# Patient Record
Sex: MALE | Race: WHITE | Employment: OTHER | URBAN - METROPOLITAN AREA
[De-identification: names, ages, dates, MRNs, and addresses within clinical notes are randomized per-mention and may not be internally consistent; named-entity substitution may affect disease eponyms.]

---

## 2018-01-22 PROBLEM — J11.1 FLU: Status: ACTIVE | Noted: 2018-01-22

## 2018-08-13 ENCOUNTER — HOSPITAL ENCOUNTER (INPATIENT)
Age: 78
LOS: 6 days | Discharge: HOME | DRG: 378 | End: 2018-08-19
Attending: EMERGENCY MEDICINE | Admitting: INTERNAL MEDICINE
Payer: MEDICARE

## 2018-08-13 DIAGNOSIS — K62.5 RECTAL BLEEDING: Primary | ICD-10-CM

## 2018-08-13 DIAGNOSIS — K62.5 BRIGHT RED RECTAL BLEEDING: ICD-10-CM

## 2018-08-13 PROBLEM — I50.9 HEART FAILURE (HCC): Status: ACTIVE | Noted: 2018-08-13

## 2018-08-13 LAB
ABO/RH: NORMAL
ALBUMIN SERPL-MCNC: 4.4 G/DL (ref 3.4–5)
ALP BLD-CCNC: 69 U/L (ref 40–129)
ALT SERPL-CCNC: 9 U/L (ref 10–40)
ANION GAP SERPL CALCULATED.3IONS-SCNC: 11 MMOL/L (ref 3–16)
ANTIBODY SCREEN: NORMAL
AST SERPL-CCNC: 16 U/L (ref 15–37)
BASOPHILS ABSOLUTE: 0.1 K/UL (ref 0–0.2)
BASOPHILS RELATIVE PERCENT: 0.8 %
BILIRUB SERPL-MCNC: 0.9 MG/DL (ref 0–1)
BILIRUBIN DIRECT: <0.2 MG/DL (ref 0–0.3)
BILIRUBIN, INDIRECT: ABNORMAL MG/DL (ref 0–1)
BUN BLDV-MCNC: 17 MG/DL (ref 7–20)
CALCIUM SERPL-MCNC: 9.6 MG/DL (ref 8.3–10.6)
CHLORIDE BLD-SCNC: 95 MMOL/L (ref 99–110)
CO2: 30 MMOL/L (ref 21–32)
CREAT SERPL-MCNC: 1.1 MG/DL (ref 0.8–1.3)
EOSINOPHILS ABSOLUTE: 0.1 K/UL (ref 0–0.6)
EOSINOPHILS RELATIVE PERCENT: 1 %
GFR AFRICAN AMERICAN: >60
GFR NON-AFRICAN AMERICAN: >60
GLUCOSE BLD-MCNC: 294 MG/DL (ref 70–99)
HCT VFR BLD CALC: 26.7 % (ref 40.5–52.5)
HCT VFR BLD CALC: 29.6 % (ref 40.5–52.5)
HCT VFR BLD CALC: 35.8 % (ref 40.5–52.5)
HEMOGLOBIN: 10 G/DL (ref 13.5–17.5)
HEMOGLOBIN: 12 G/DL (ref 13.5–17.5)
HEMOGLOBIN: 9.1 G/DL (ref 13.5–17.5)
INR BLD: 1.13 (ref 0.86–1.14)
LACTIC ACID: 1.3 MMOL/L (ref 0.4–2)
LIPASE: 13 U/L (ref 13–60)
LYMPHOCYTES ABSOLUTE: 1 K/UL (ref 1–5.1)
LYMPHOCYTES RELATIVE PERCENT: 15.4 %
MCH RBC QN AUTO: 29.3 PG (ref 26–34)
MCHC RBC AUTO-ENTMCNC: 33.4 G/DL (ref 31–36)
MCV RBC AUTO: 87.9 FL (ref 80–100)
MONOCYTES ABSOLUTE: 0.5 K/UL (ref 0–1.3)
MONOCYTES RELATIVE PERCENT: 7.2 %
NEUTROPHILS ABSOLUTE: 4.8 K/UL (ref 1.7–7.7)
NEUTROPHILS RELATIVE PERCENT: 75.6 %
PDW BLD-RTO: 14.4 % (ref 12.4–15.4)
PLATELET # BLD: 220 K/UL (ref 135–450)
PMV BLD AUTO: 9.3 FL (ref 5–10.5)
POTASSIUM REFLEX MAGNESIUM: 4 MMOL/L (ref 3.5–5.1)
PROTHROMBIN TIME: 12.9 SEC (ref 9.8–13)
RBC # BLD: 4.07 M/UL (ref 4.2–5.9)
SODIUM BLD-SCNC: 136 MMOL/L (ref 136–145)
TOTAL PROTEIN: 7.9 G/DL (ref 6.4–8.2)
WBC # BLD: 6.3 K/UL (ref 4–11)

## 2018-08-13 PROCEDURE — 85610 PROTHROMBIN TIME: CPT

## 2018-08-13 PROCEDURE — 80076 HEPATIC FUNCTION PANEL: CPT

## 2018-08-13 PROCEDURE — 96361 HYDRATE IV INFUSION ADD-ON: CPT

## 2018-08-13 PROCEDURE — 82272 OCCULT BLD FECES 1-3 TESTS: CPT

## 2018-08-13 PROCEDURE — 96360 HYDRATION IV INFUSION INIT: CPT

## 2018-08-13 PROCEDURE — 2580000003 HC RX 258: Performed by: NURSE PRACTITIONER

## 2018-08-13 PROCEDURE — 86901 BLOOD TYPING SEROLOGIC RH(D): CPT

## 2018-08-13 PROCEDURE — 36415 COLL VENOUS BLD VENIPUNCTURE: CPT

## 2018-08-13 PROCEDURE — 86850 RBC ANTIBODY SCREEN: CPT

## 2018-08-13 PROCEDURE — 86923 COMPATIBILITY TEST ELECTRIC: CPT

## 2018-08-13 PROCEDURE — 85014 HEMATOCRIT: CPT

## 2018-08-13 PROCEDURE — 2580000003 HC RX 258: Performed by: STUDENT IN AN ORGANIZED HEALTH CARE EDUCATION/TRAINING PROGRAM

## 2018-08-13 PROCEDURE — 83690 ASSAY OF LIPASE: CPT

## 2018-08-13 PROCEDURE — 85025 COMPLETE CBC W/AUTO DIFF WBC: CPT

## 2018-08-13 PROCEDURE — 80048 BASIC METABOLIC PNL TOTAL CA: CPT

## 2018-08-13 PROCEDURE — 86900 BLOOD TYPING SEROLOGIC ABO: CPT

## 2018-08-13 PROCEDURE — 6370000000 HC RX 637 (ALT 250 FOR IP): Performed by: STUDENT IN AN ORGANIZED HEALTH CARE EDUCATION/TRAINING PROGRAM

## 2018-08-13 PROCEDURE — 99285 EMERGENCY DEPT VISIT HI MDM: CPT

## 2018-08-13 PROCEDURE — 85018 HEMOGLOBIN: CPT

## 2018-08-13 PROCEDURE — 83605 ASSAY OF LACTIC ACID: CPT

## 2018-08-13 PROCEDURE — 1200000000 HC SEMI PRIVATE

## 2018-08-13 RX ORDER — 0.9 % SODIUM CHLORIDE 0.9 %
5 VIAL (ML) INJECTION DAILY
Status: DISCONTINUED | OUTPATIENT
Start: 2018-08-19 | End: 2018-08-19 | Stop reason: HOSPADM

## 2018-08-13 RX ORDER — GABAPENTIN 600 MG/1
600 TABLET ORAL 3 TIMES DAILY
Status: DISCONTINUED | OUTPATIENT
Start: 2018-08-13 | End: 2018-08-19 | Stop reason: HOSPADM

## 2018-08-13 RX ORDER — 0.9 % SODIUM CHLORIDE 0.9 %
250 INTRAVENOUS SOLUTION INTRAVENOUS ONCE
Status: DISCONTINUED | OUTPATIENT
Start: 2018-08-13 | End: 2018-08-13

## 2018-08-13 RX ORDER — ONDANSETRON 2 MG/ML
4 INJECTION INTRAMUSCULAR; INTRAVENOUS EVERY 6 HOURS PRN
Status: DISCONTINUED | OUTPATIENT
Start: 2018-08-13 | End: 2018-08-19 | Stop reason: HOSPADM

## 2018-08-13 RX ORDER — DEXTROSE MONOHYDRATE 25 G/50ML
12.5 INJECTION, SOLUTION INTRAVENOUS PRN
Status: DISCONTINUED | OUTPATIENT
Start: 2018-08-13 | End: 2018-08-19 | Stop reason: HOSPADM

## 2018-08-13 RX ORDER — 0.9 % SODIUM CHLORIDE 0.9 %
5 VIAL (ML) INJECTION DAILY
Status: DISCONTINUED | OUTPATIENT
Start: 2018-08-19 | End: 2018-08-13

## 2018-08-13 RX ORDER — 0.9 % SODIUM CHLORIDE 0.9 %
1000 INTRAVENOUS SOLUTION INTRAVENOUS ONCE
Status: COMPLETED | OUTPATIENT
Start: 2018-08-13 | End: 2018-08-13

## 2018-08-13 RX ORDER — SODIUM CHLORIDE 9 MG/ML
INJECTION, SOLUTION INTRAVENOUS CONTINUOUS
Status: DISCONTINUED | OUTPATIENT
Start: 2018-08-13 | End: 2018-08-18

## 2018-08-13 RX ORDER — DEXTROSE MONOHYDRATE 50 MG/ML
100 INJECTION, SOLUTION INTRAVENOUS PRN
Status: DISCONTINUED | OUTPATIENT
Start: 2018-08-13 | End: 2018-08-19 | Stop reason: HOSPADM

## 2018-08-13 RX ORDER — SODIUM CHLORIDE 0.9 % (FLUSH) 0.9 %
10 SYRINGE (ML) INJECTION EVERY 12 HOURS SCHEDULED
Status: DISCONTINUED | OUTPATIENT
Start: 2018-08-13 | End: 2018-08-19 | Stop reason: HOSPADM

## 2018-08-13 RX ORDER — LEVOTHYROXINE SODIUM 112 UG/1
112 TABLET ORAL DAILY
Status: ON HOLD | COMMUNITY
End: 2021-10-07

## 2018-08-13 RX ORDER — SODIUM CHLORIDE 0.9 % (FLUSH) 0.9 %
10 SYRINGE (ML) INJECTION PRN
Status: DISCONTINUED | OUTPATIENT
Start: 2018-08-13 | End: 2018-08-19 | Stop reason: HOSPADM

## 2018-08-13 RX ORDER — LEVOTHYROXINE SODIUM ANHYDROUS 100 UG/5ML
56 INJECTION, POWDER, LYOPHILIZED, FOR SOLUTION INTRAVENOUS DAILY
Status: DISCONTINUED | OUTPATIENT
Start: 2018-08-19 | End: 2018-08-19 | Stop reason: HOSPADM

## 2018-08-13 RX ORDER — NICOTINE POLACRILEX 4 MG
15 LOZENGE BUCCAL PRN
Status: DISCONTINUED | OUTPATIENT
Start: 2018-08-13 | End: 2018-08-19 | Stop reason: HOSPADM

## 2018-08-13 RX ORDER — GABAPENTIN 300 MG/1
600 CAPSULE ORAL 3 TIMES DAILY
COMMUNITY

## 2018-08-13 RX ORDER — SODIUM CHLORIDE 9 MG/ML
INJECTION, SOLUTION INTRAVENOUS CONTINUOUS
Status: DISCONTINUED | OUTPATIENT
Start: 2018-08-13 | End: 2018-08-13

## 2018-08-13 RX ORDER — LEVOTHYROXINE SODIUM ANHYDROUS 100 UG/5ML
61 INJECTION, POWDER, LYOPHILIZED, FOR SOLUTION INTRAVENOUS DAILY
Status: DISCONTINUED | OUTPATIENT
Start: 2018-08-19 | End: 2018-08-13

## 2018-08-13 RX ADMIN — SODIUM CHLORIDE 1000 ML: 9 INJECTION, SOLUTION INTRAVENOUS at 21:45

## 2018-08-13 RX ADMIN — SODIUM CHLORIDE 1000 ML: 9 INJECTION, SOLUTION INTRAVENOUS at 14:29

## 2018-08-13 RX ADMIN — GABAPENTIN 600 MG: 600 TABLET, FILM COATED ORAL at 21:46

## 2018-08-13 RX ADMIN — Medication 10 ML: at 21:46

## 2018-08-13 RX ADMIN — SODIUM CHLORIDE: 9 INJECTION, SOLUTION INTRAVENOUS at 21:55

## 2018-08-13 RX ADMIN — SODIUM CHLORIDE: 900 INJECTION, SOLUTION INTRAVENOUS at 21:48

## 2018-08-13 ASSESSMENT — ENCOUNTER SYMPTOMS
ABDOMINAL DISTENTION: 0
BLOOD IN STOOL: 1
ABDOMINAL PAIN: 1
SHORTNESS OF BREATH: 0

## 2018-08-13 NOTE — ED PROVIDER NOTES
1 [facility]  EMERGENCY DEPARTMENT ENCOUNTER          NURSE PRACTITIONER NOTE       Date of evaluation: 8/13/2018    Chief Complaint     Rectal Bleeding and Dizziness    History of Present Illness     Елена Ramirez is a 65 y.o. male who presents To the emergency department with a complaint of rectal bleeding. Patient states that he began having bright red rectal bleeding this morning. Patient states that he has had 2 episodes thus far. Patient states that he has had this happen to him in the past and states that he had a colonoscopy but the bleeding stopped. Patient states that Dr. Anita Stinson was his GI doctor. He states that he does have anemia at baseline and gets iron infusions. Patient states that he has had blood transfusions in the past by many years ago. He denies any fevers, chills, chest pain, shortness of breath, nausea, vomiting, or diarrhea. He states that he did have an episode of left lower quadrant and abdominal pain yesterday that spontaneously resolved. Review of Systems     Review of Systems   Constitutional: Negative for chills and fever. Respiratory: Negative for shortness of breath. Cardiovascular: Negative for chest pain and palpitations. Gastrointestinal: Positive for abdominal pain and blood in stool. Negative for abdominal distention. Neurological: Positive for dizziness. All other systems reviewed and are negative. Past Medical, Surgical, Family, and Social History     He has a past medical history of Arthritis; Blood transfusion; CAD (coronary artery disease); Cancer ([address] Cataract; Diabetes; Heart disease; High blood pressure; Sleep apnea; Thyroid disease; and Vascular disease. He has a past surgical history that includes back surgery; Elbow surgery (5/10/11); Cardiac surgery; Abdomen surgery; Appendectomy; knee surgery; Uvulopalatopharygoplasty;  Cataract removal with implant (2011); eye surgery (Bilateral, 12/20/11); joint replacement (toe metatarsal); Cholecystectomy, laparoscopic (N/A, 12/5/13); Cholecystectomy, laparoscopic (12/5/2013); and Total shoulder arthroplasty (Right). His family history includes Cancer in an other family member; Diabetes in an other family member; Stroke in an other family member. He reports that he has never smoked. He has never used smokeless tobacco. He reports that he does not drink alcohol or use drugs. Medications     Previous Medications    ASPIRIN 81 MG TABLET    Take 81 mg by mouth daily    EZETIMIBE (ZETIA) 10 MG TABLET    Take 10 mg by mouth daily    FUROSEMIDE (LASIX) 20 MG TABLET    Take 20 mg by mouth daily. GABAPENTIN (NEURONTIN) 600 MG TABLET    Take 600 mg by mouth 3 times daily. .    INSULIN INFUSION PUMP    by Does not apply route. Indications: uses insulin pump-dosage varies    INSULIN REGULAR HUMAN (HUMULIN R U-500) 500 UNIT/ML CONCENTRATED INJECTION VIAL    Inject into the skin Use with insulin pump    LEVOTHYROXINE (SYNTHROID) 112 MCG TABLET    Take 112 mcg by mouth Daily    LOSARTAN-HYDROCHLOROTHIAZIDE (HYZAAR) 100-12.5 MG PER TABLET    Take 1 tablet by mouth daily    MULTIPLE VITAMINS-MINERALS (CENTRUM SILVER PO)    Take 1 tablet by mouth daily     TRAMADOL (ULTRAM) 50 MG TABLET    Take 50 mg by mouth every 6 hours as needed. Allergies     He is allergic to bactrim; mupirocin; polysporin [bacitracin-polymyxin b]; tape [adhesive tape]; and ancef [cefazolin sodium]. Physical Exam     INITIAL VITALS: BP: (!) 150/86, Temp: 98 °F (36.7 °C), Pulse: 82, Resp: 12, SpO2: 96 %     Physical Exam   Constitutional: He is oriented to person, place, and time. He appears well-developed and well-nourished. No distress. HENT:   Head: Normocephalic and atraumatic. Eyes: Pupils are equal, round, and reactive to light. EOM are normal.   Neck: Neck supple. Cardiovascular: Normal rate and regular rhythm. Exam reveals no gallop and no friction rub. No murmur heard.   Pulmonary/Chest: Effort normal Lipase 13.0 13.0 - 60.0 U/L     RECENT VITALS:  BP: (!) 125/55, Temp: 98 °F (36.7 °C), Pulse: 69, Resp: 12, SpO2: 98 %     Procedures     NA    ED Course     Nursing Notes, Past Medical Hx, Past Surgical Hx, Social Hx, Allergies, and Family Hx were reviewed. The patient was given the following medications:  Orders Placed This Encounter   Medications    0.9 % sodium chloride bolus            CONSULTS:  IP CONSULT TO GI  IP CONSULT TO HOSPITALIST    MEDICAL DECISION MAKING / ASSESSMENT / PLAN     Briefly this is a is a 65 y.o. male who presents to the emergency department with rectal bleeding. Patient presented afebrile with normal vitals. Patient was in no acute distress, nontoxic and non-hypoxic. Patient was able to complete full sentences at bedside. Patient was not using accessory muscles. Patient was able to cooperate with history and physical exam.  Patient's previous charts, labs and imaging was reviewed. Please see HPI and physical for further details. On examination patient is guaiac positive rectal exam.  Patient did have an episode while in the emergency department as well with bright red blood in the toilet. Patient does have normocytic anemia with a hemoglobin of 12 of which is reassuring. His renal panel is unremarkable. His liver enzymes and lipase were also unremarkable. Patient continues to have rectal bleeding every time he goes to the bathroom while in the emergency department, given this we feel he will be appropriate to admit the patient for further evaluation. GI was consulted as well for further evaluation and treatment. The patient was seen and evaluated by the attending physician Dr. Brie Harden MD who agreed with the assessment and plan. The patient and / or the family were informed of the results of any tests, a time was given to answer questions, a plan was proposed and they agreed with plan.      At this time the patient has been admitted to Medicine for further evaluation and management of GI bleeding. The patient will continue to be monitored here in the emergency department until which time he is moved to his new treatment location. Clinical Impression     1. Rectal bleeding      Disposition     PATIENT REFERRED TO:  No follow-up provider specified.     DISCHARGE MEDICATIONS:  New Prescriptions    No medications on file       DISPOSITION       Decision to JAZZY Tellez CNP  08/14/18 [id number]

## 2018-08-13 NOTE — ED PROVIDER NOTES
ED Attending Attestation Note     Date of evaluation: 8/13/2018    This patient was seen by the advance practice provider. I have seen and examined the patient, agree with the workup, evaluation, management and diagnosis. The care plan has been discussed. My assessment reveals A 71-year-old male who presents with chief complaint of rectal bleeding. States he has had 3 episodes of bright red blood per rectum in the last 12 hours. Patient is also complaining of mild dizziness since this started. History of this happening once last year with no findings on colonoscopy. Patient having ongoing symptoms while here in the emergency department, rectal exam positive for gross red blood per EBONI report. Will admit for workup.        Dayanara Ordoñez MD  08/13/18 9701

## 2018-08-13 NOTE — ED TRIAGE NOTES
Unable to review Home Medication List with the patient Medications list will need to be re-assessed.

## 2018-08-13 NOTE — ED NOTES
This RN chaperoned rectal exam with NP, patient tolerated well.      Tiffany Browne, RN  08/13/18 [id number]

## 2018-08-14 LAB
ALBUMIN SERPL-MCNC: 3.4 G/DL (ref 3.4–5)
ANION GAP SERPL CALCULATED.3IONS-SCNC: 7 MMOL/L (ref 3–16)
BASOPHILS ABSOLUTE: 0 K/UL (ref 0–0.2)
BASOPHILS RELATIVE PERCENT: 0.8 %
BUN BLDV-MCNC: 18 MG/DL (ref 7–20)
CALCIUM SERPL-MCNC: 8.4 MG/DL (ref 8.3–10.6)
CHLORIDE BLD-SCNC: 106 MMOL/L (ref 99–110)
CO2: 29 MMOL/L (ref 21–32)
CREAT SERPL-MCNC: 0.9 MG/DL (ref 0.8–1.3)
EOSINOPHILS ABSOLUTE: 0.1 K/UL (ref 0–0.6)
EOSINOPHILS RELATIVE PERCENT: 2 %
GFR AFRICAN AMERICAN: >60
GFR NON-AFRICAN AMERICAN: >60
GLUCOSE BLD-MCNC: 128 MG/DL (ref 70–99)
GLUCOSE BLD-MCNC: 138 MG/DL (ref 70–99)
GLUCOSE BLD-MCNC: 145 MG/DL (ref 70–99)
GLUCOSE BLD-MCNC: 165 MG/DL (ref 70–99)
HCT VFR BLD CALC: 22.7 % (ref 40.5–52.5)
HCT VFR BLD CALC: 23.4 % (ref 40.5–52.5)
HCT VFR BLD CALC: 24.6 % (ref 40.5–52.5)
HEMOGLOBIN: 7.7 G/DL (ref 13.5–17.5)
HEMOGLOBIN: 7.9 G/DL (ref 13.5–17.5)
HEMOGLOBIN: 8.3 G/DL (ref 13.5–17.5)
LYMPHOCYTES ABSOLUTE: 1.5 K/UL (ref 1–5.1)
LYMPHOCYTES RELATIVE PERCENT: 23.9 %
MAGNESIUM: 1.7 MG/DL (ref 1.8–2.4)
MCH RBC QN AUTO: 29.8 PG (ref 26–34)
MCHC RBC AUTO-ENTMCNC: 33.9 G/DL (ref 31–36)
MCV RBC AUTO: 88 FL (ref 80–100)
MONOCYTES ABSOLUTE: 0.4 K/UL (ref 0–1.3)
MONOCYTES RELATIVE PERCENT: 6.6 %
NEUTROPHILS ABSOLUTE: 4.2 K/UL (ref 1.7–7.7)
NEUTROPHILS RELATIVE PERCENT: 66.7 %
PDW BLD-RTO: 14 % (ref 12.4–15.4)
PERFORMED ON: ABNORMAL
PHOSPHORUS: 3 MG/DL (ref 2.5–4.9)
PLATELET # BLD: 156 K/UL (ref 135–450)
PMV BLD AUTO: 8.9 FL (ref 5–10.5)
POC OCCULT BLOOD STOOL: POSITIVE
POTASSIUM SERPL-SCNC: 4.2 MMOL/L (ref 3.5–5.1)
RBC # BLD: 2.8 M/UL (ref 4.2–5.9)
SODIUM BLD-SCNC: 142 MMOL/L (ref 136–145)
WBC # BLD: 6.3 K/UL (ref 4–11)

## 2018-08-14 PROCEDURE — 85025 COMPLETE CBC W/AUTO DIFF WBC: CPT

## 2018-08-14 PROCEDURE — 85018 HEMOGLOBIN: CPT

## 2018-08-14 PROCEDURE — 85014 HEMATOCRIT: CPT

## 2018-08-14 PROCEDURE — 83735 ASSAY OF MAGNESIUM: CPT

## 2018-08-14 PROCEDURE — 6370000000 HC RX 637 (ALT 250 FOR IP): Performed by: STUDENT IN AN ORGANIZED HEALTH CARE EDUCATION/TRAINING PROGRAM

## 2018-08-14 PROCEDURE — 2580000003 HC RX 258: Performed by: STUDENT IN AN ORGANIZED HEALTH CARE EDUCATION/TRAINING PROGRAM

## 2018-08-14 PROCEDURE — 1200000000 HC SEMI PRIVATE

## 2018-08-14 PROCEDURE — 80069 RENAL FUNCTION PANEL: CPT

## 2018-08-14 PROCEDURE — 36415 COLL VENOUS BLD VENIPUNCTURE: CPT

## 2018-08-14 RX ORDER — 0.9 % SODIUM CHLORIDE 0.9 %
250 INTRAVENOUS SOLUTION INTRAVENOUS ONCE
Status: COMPLETED | OUTPATIENT
Start: 2018-08-14 | End: 2018-08-15

## 2018-08-14 RX ORDER — TRAMADOL HYDROCHLORIDE 50 MG/1
50 TABLET ORAL EVERY 6 HOURS PRN
Status: DISCONTINUED | OUTPATIENT
Start: 2018-08-14 | End: 2018-08-19 | Stop reason: HOSPADM

## 2018-08-14 RX ADMIN — SODIUM CHLORIDE 250 ML: 9 INJECTION, SOLUTION INTRAVENOUS at 16:27

## 2018-08-14 RX ADMIN — TRAMADOL HYDROCHLORIDE 50 MG: 50 TABLET, FILM COATED ORAL at 12:18

## 2018-08-14 RX ADMIN — GABAPENTIN 600 MG: 600 TABLET, FILM COATED ORAL at 14:08

## 2018-08-14 RX ADMIN — GABAPENTIN 600 MG: 600 TABLET, FILM COATED ORAL at 20:46

## 2018-08-14 RX ADMIN — Medication 10 ML: at 20:46

## 2018-08-14 RX ADMIN — GABAPENTIN 600 MG: 600 TABLET, FILM COATED ORAL at 09:28

## 2018-08-14 RX ADMIN — SODIUM CHLORIDE: 9 INJECTION, SOLUTION INTRAVENOUS at 14:09

## 2018-08-14 ASSESSMENT — PAIN DESCRIPTION - DESCRIPTORS: DESCRIPTORS: BURNING;TINGLING

## 2018-08-14 ASSESSMENT — ACTIVITIES OF DAILY LIVING (ADL): EFFECT OF PAIN ON DAILY ACTIVITIES: DISCOMFORT

## 2018-08-14 ASSESSMENT — PAIN DESCRIPTION - PROGRESSION
CLINICAL_PROGRESSION: NOT CHANGED

## 2018-08-14 ASSESSMENT — PAIN SCALES - GENERAL
PAINLEVEL_OUTOF10: 8
PAINLEVEL_OUTOF10: 10

## 2018-08-14 ASSESSMENT — PAIN DESCRIPTION - LOCATION: LOCATION: OTHER (COMMENT)

## 2018-08-14 ASSESSMENT — PAIN DESCRIPTION - ONSET: ONSET: ON-GOING

## 2018-08-14 ASSESSMENT — PAIN DESCRIPTION - FREQUENCY: FREQUENCY: CONTINUOUS

## 2018-08-14 ASSESSMENT — PAIN DESCRIPTION - PAIN TYPE: TYPE: NEUROPATHIC PAIN

## 2018-08-14 NOTE — H&P
History and Physical  PGY-2    Hospital Day: 1                                                         Code:Full Code  Admit Date: 8/13/2018  1:14 PM            PCP: Sindhu Fountain MD                                  Chief Complaint: BRBPR    History of present illness:   Desire Reyna is a 65 y.o. male with a PMH IDDM, HTN, diverticulosis (2015), and other as below p/w 1 day of BRBPR. He reports 4 episodes of large BRBPR with clots since this morning. He also endorses dizziness especially with movement, as well as occasional chills. He felt like his usual self yesterday but today the bleeding began and he started feeling fatigued so he presented to the ED. He denies any fevers, episodes of LOC, n/v, prior diarrhea, recent infections or other recent issues with his health. He reports mild abdominal \"soreness\" around his belt line, not exacerbated or alleviated by anything in particular. He reports that he remembers one prior episode similar to this around 1-2 years ago, and recalls vaguely that he was told he had diverticulosis. He also recalls having diverticulitis around 30 years ago, which he reports being very painful without bleeding, and much worse than what he was experiencing at this point. He has had hemorrhoids in the past but reports he always had them removed, and when they bled it was never this much. In the ED, patient got 1 L IV NS, his VS remained stable.     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    Cataract     Diabetes     Heart disease     High blood pressure     Sleep apnea     no machine    Thyroid disease     Vascular disease        Past Surgical History:   Procedure bore IVs   - orthostatic VS ordered  - second 1 L bolus of IV NS, followed by IV NS @ 100 ml/hr  - T&S ordered  - trend H&H q6h, transfuse pRBCs for Hb<8-9 given active bleeding   - diet NPO  - hold home HTN meds    HTN - BPs 150s/80s on presentation, now 120s-130s/50s, stable  - hold home hyzaar and lasix until bleeding stops    IDDM2 - well controlled on home insulin pump, last HbA1c 7.2 in Nov 2017  - keep patient on insulin pump per patient request  - Accuchecks 4 x daily  - hypoglycemic protocol    Remote hx of CAD and possibly CABG - from chart, patient may have had CABG in early 2000s.  No recent ECHO results found.  - hold home aspirin  - consider ECHO once bleeding resolves  - monitor for fluid overload s/s  - pharm to med rec      ============================================================================    FEN:  NPO for GI bleed  PPX:  SCDs only given active bleeding  DISPO: wards, low threshold for tx to ICU    This patient has been staffed and discussed with Norm Forman MD.   -----------------------------  Valdemar Martin MD PGY-2  Pager - [phone]  08/13/18

## 2018-08-14 NOTE — PROGRESS NOTES
Internal Medicine PGY-1 Resident Progress Note        PCP: Ananda Mercedes MD    Date of Admission: 8/13/2018    Chief Complaint: BRPR    Subjective: Patient seen at bedside today. He had one episode of bloody stool after being admitted to the floor. Patient states that he has had some lightheadedness after his last BM, but it resolves when he sits down. He states he gets lightheaded with exertion. Patient denies any chest pain, shortness of breath or palpitations. Medications:  Reviewed    Infusion Medications    Insulin Pump - insulin regular U-500 (CONC)      sodium chloride 100 mL/hr at 08/14/18 1409    dextrose       Scheduled Medications    gabapentin  600 mg Oral TID    sodium chloride flush  10 mL Intravenous 2 times per day    [START ON 8/19/2018] levothyroxine  56 mcg Intravenous Daily    And    [START ON 8/19/2018] sodium chloride (PF)  5 mL Intravenous Daily     PRN Meds: traMADol, sodium chloride flush, magnesium hydroxide, ondansetron, glucose, dextrose, glucagon (rDNA), dextrose    No intake or output data in the 24 hours ending 08/14/18 1523    Physical Exam Performed:    BP (!) 147/67   Pulse 69   Temp 98.4 °F (36.9 °C) (Oral)   Resp 18   Ht 5' 8\" (1.727 m)   Wt 199 lb 1.2 oz (90.3 kg)   SpO2 96%   BMI 30.27 kg/m²     Physical Exam   Constitutional: He is oriented to person, place, and time. He appears well-developed and well-nourished. HENT:   Head: Normocephalic and atraumatic. Eyes: Conjunctivae and EOM are normal.   Cardiovascular: Normal rate, regular rhythm and intact distal pulses. Exam reveals no gallop and no friction rub. No murmur heard. Pulmonary/Chest: Effort normal and breath sounds normal. No respiratory distress. He has no wheezes. He has no rales. Abdominal: Soft. Bowel sounds are normal. He exhibits no distension. There is tenderness. There is no rebound and no guarding. Neurological: He is alert and oriented to person, place, and time.    Skin: He is not pump per patient request  - NPO for now  - Accuchecks 4 x daily  - hypoglycemic protocol     Remote hx of CAD and possibly CABG - from chart, patient may have had CABG in early 2000s.  No recent ECHO results found.  - hold home aspirin  - consider ECHO once bleeding resolves  - monitor for fluid overload s/s  - pharm to med rec    DVT Prophylaxis: None due to active bleed  Diet: Diet NPO Effective Now  Code Status: Full Code    PT/OT Eval Status: None     Dispo - General medical floor    Donna Canales DO    I will discuss the patient with the senior resident and MD Donna Aponte, [place]   Internal Medicine Resident PGY-1  Pager: [phone]

## 2018-08-15 LAB
ALBUMIN SERPL-MCNC: 3.6 G/DL (ref 3.4–5)
ANION GAP SERPL CALCULATED.3IONS-SCNC: 8 MMOL/L (ref 3–16)
BASOPHILS ABSOLUTE: 0 K/UL (ref 0–0.2)
BASOPHILS RELATIVE PERCENT: 0.7 %
BUN BLDV-MCNC: 13 MG/DL (ref 7–20)
CALCIUM SERPL-MCNC: 8.6 MG/DL (ref 8.3–10.6)
CHLORIDE BLD-SCNC: 106 MMOL/L (ref 99–110)
CO2: 28 MMOL/L (ref 21–32)
CREAT SERPL-MCNC: 0.9 MG/DL (ref 0.8–1.3)
EOSINOPHILS ABSOLUTE: 0.1 K/UL (ref 0–0.6)
EOSINOPHILS RELATIVE PERCENT: 1.8 %
GFR AFRICAN AMERICAN: >60
GFR NON-AFRICAN AMERICAN: >60
GLUCOSE BLD-MCNC: 110 MG/DL (ref 70–99)
GLUCOSE BLD-MCNC: 131 MG/DL (ref 70–99)
GLUCOSE BLD-MCNC: 133 MG/DL (ref 70–99)
GLUCOSE BLD-MCNC: 177 MG/DL (ref 70–99)
HCT VFR BLD CALC: 21.3 % (ref 40.5–52.5)
HCT VFR BLD CALC: 21.9 % (ref 40.5–52.5)
HCT VFR BLD CALC: 22.6 % (ref 40.5–52.5)
HCT VFR BLD CALC: 23 % (ref 40.5–52.5)
HEMOGLOBIN: 7.2 G/DL (ref 13.5–17.5)
HEMOGLOBIN: 7.3 G/DL (ref 13.5–17.5)
HEMOGLOBIN: 7.6 G/DL (ref 13.5–17.5)
HEMOGLOBIN: 7.7 G/DL (ref 13.5–17.5)
LYMPHOCYTES ABSOLUTE: 1.2 K/UL (ref 1–5.1)
LYMPHOCYTES RELATIVE PERCENT: 21 %
MCH RBC QN AUTO: 29.8 PG (ref 26–34)
MCHC RBC AUTO-ENTMCNC: 34.3 G/DL (ref 31–36)
MCV RBC AUTO: 86.9 FL (ref 80–100)
MONOCYTES ABSOLUTE: 0.5 K/UL (ref 0–1.3)
MONOCYTES RELATIVE PERCENT: 8.5 %
NEUTROPHILS ABSOLUTE: 3.8 K/UL (ref 1.7–7.7)
NEUTROPHILS RELATIVE PERCENT: 68 %
PDW BLD-RTO: 14.1 % (ref 12.4–15.4)
PERFORMED ON: ABNORMAL
PHOSPHORUS: 2.4 MG/DL (ref 2.5–4.9)
PLATELET # BLD: 154 K/UL (ref 135–450)
PMV BLD AUTO: 8.9 FL (ref 5–10.5)
POTASSIUM SERPL-SCNC: 4 MMOL/L (ref 3.5–5.1)
RBC # BLD: 2.6 M/UL (ref 4.2–5.9)
SODIUM BLD-SCNC: 142 MMOL/L (ref 136–145)
WBC # BLD: 5.6 K/UL (ref 4–11)

## 2018-08-15 PROCEDURE — 1200000000 HC SEMI PRIVATE

## 2018-08-15 PROCEDURE — 85014 HEMATOCRIT: CPT

## 2018-08-15 PROCEDURE — 6370000000 HC RX 637 (ALT 250 FOR IP): Performed by: INTERNAL MEDICINE

## 2018-08-15 PROCEDURE — 80069 RENAL FUNCTION PANEL: CPT

## 2018-08-15 PROCEDURE — 36415 COLL VENOUS BLD VENIPUNCTURE: CPT

## 2018-08-15 PROCEDURE — 2580000003 HC RX 258: Performed by: STUDENT IN AN ORGANIZED HEALTH CARE EDUCATION/TRAINING PROGRAM

## 2018-08-15 PROCEDURE — 85018 HEMOGLOBIN: CPT

## 2018-08-15 PROCEDURE — 85025 COMPLETE CBC W/AUTO DIFF WBC: CPT

## 2018-08-15 PROCEDURE — 6370000000 HC RX 637 (ALT 250 FOR IP): Performed by: STUDENT IN AN ORGANIZED HEALTH CARE EDUCATION/TRAINING PROGRAM

## 2018-08-15 RX ORDER — CAPSAICIN 0.025 %
CREAM (GRAM) TOPICAL 2 TIMES DAILY
Status: DISCONTINUED | OUTPATIENT
Start: 2018-08-15 | End: 2018-08-19 | Stop reason: HOSPADM

## 2018-08-15 RX ADMIN — Medication 10 ML: at 20:51

## 2018-08-15 RX ADMIN — CAPSAICIN: 0.25 CREAM TOPICAL at 13:03

## 2018-08-15 RX ADMIN — SODIUM CHLORIDE: 9 INJECTION, SOLUTION INTRAVENOUS at 23:57

## 2018-08-15 RX ADMIN — GABAPENTIN 600 MG: 600 TABLET, FILM COATED ORAL at 08:48

## 2018-08-15 RX ADMIN — CAPSAICIN: 0.25 CREAM TOPICAL at 20:51

## 2018-08-15 RX ADMIN — TRAMADOL HYDROCHLORIDE 50 MG: 50 TABLET, FILM COATED ORAL at 08:48

## 2018-08-15 RX ADMIN — SODIUM CHLORIDE: 9 INJECTION, SOLUTION INTRAVENOUS at 15:02

## 2018-08-15 RX ADMIN — GABAPENTIN 600 MG: 600 TABLET, FILM COATED ORAL at 14:18

## 2018-08-15 RX ADMIN — GABAPENTIN 600 MG: 600 TABLET, FILM COATED ORAL at 20:51

## 2018-08-15 ASSESSMENT — PAIN DESCRIPTION - ONSET
ONSET: ON-GOING

## 2018-08-15 ASSESSMENT — PAIN DESCRIPTION - PROGRESSION
CLINICAL_PROGRESSION: NOT CHANGED

## 2018-08-15 ASSESSMENT — PAIN DESCRIPTION - FREQUENCY
FREQUENCY: CONTINUOUS

## 2018-08-15 ASSESSMENT — PAIN DESCRIPTION - DESCRIPTORS
DESCRIPTORS: BURNING;TINGLING

## 2018-08-15 ASSESSMENT — PAIN DESCRIPTION - ORIENTATION: ORIENTATION: LOWER

## 2018-08-15 ASSESSMENT — PAIN DESCRIPTION - PAIN TYPE
TYPE: NEUROPATHIC PAIN

## 2018-08-15 ASSESSMENT — ACTIVITIES OF DAILY LIVING (ADL)
EFFECT OF PAIN ON DAILY ACTIVITIES: DISCOMFORT

## 2018-08-15 ASSESSMENT — PAIN SCALES - GENERAL
PAINLEVEL_OUTOF10: 7
PAINLEVEL_OUTOF10: 10
PAINLEVEL_OUTOF10: 5
PAINLEVEL_OUTOF10: 7

## 2018-08-15 ASSESSMENT — PAIN DESCRIPTION - LOCATION
LOCATION: OTHER (COMMENT)
LOCATION: OTHER (COMMENT)

## 2018-08-15 NOTE — PROGRESS NOTES
Internal Medicine PGY-1 Resident Progress Note        PCP: Andrew Hay MD    Date of Admission: 8/13/2018    Chief Complaint: BRPR    Subjective: Patient seen at bedside today. This morning he states that his BM frequency seems to be less and the amount of blood in his stool has decreased. Has some dizziness when he exerts himself, but no LOC  He denies any pain, palpitations, sweating or shakiness    Medications:  Reviewed    Infusion Medications    Insulin Pump - insulin regular U-500 (CONC)      sodium chloride 100 mL/hr at 08/14/18 1409    dextrose       Scheduled Medications    capsaicin   Topical BID    gabapentin  600 mg Oral TID    sodium chloride flush  10 mL Intravenous 2 times per day    [START ON 8/19/2018] levothyroxine  56 mcg Intravenous Daily    And    [START ON 8/19/2018] sodium chloride (PF)  5 mL Intravenous Daily     PRN Meds: traMADol, sodium chloride flush, magnesium hydroxide, ondansetron, glucose, dextrose, glucagon (rDNA), dextrose      Intake/Output Summary (Last 24 hours) at 08/15/18 1344  Last data filed at 08/15/18 1307   Gross per 24 hour   Intake              360 ml   Output             1300 ml   Net             -940 ml       Physical Exam Performed:    /61   Pulse 66   Temp 98.5 °F (36.9 °C) (Oral)   Resp 18   Ht 5' 8\" (1.727 m)   Wt 199 lb 1.2 oz (90.3 kg)   SpO2 94%   BMI 30.27 kg/m²      Physical Exam   Constitutional: He is oriented to person, place, and time. He appears well-developed and well-nourished. HENT:   Head: Normocephalic and atraumatic. Eyes: Conjunctivae and EOM are normal.   Cardiovascular: Normal rate, regular rhythm and intact distal pulses. Exam reveals no gallop and no friction rub. No murmur heard. Pulmonary/Chest: Effort normal and breath sounds normal. No respiratory distress. He has no wheezes. He has no rales. Abdominal: Soft. Bowel sounds are normal. He exhibits no distension. There is tenderness.  There is no rebound and no guarding. Neurological: He is alert and oriented to person, place, and time. Skin: He is not diaphoretic. Labs:   Recent Labs      08/13/18   1404   08/14/18   0546   08/15/18   0009  08/15/18   0714  08/15/18   1142   WBC  6.3   --   6.3   --    --   5.6   --    HGB  12.0*   < >  8.3*   < >  7.6*  7.7*  7.3*   HCT  35.8*   < >  24.6*   < >  23.0*  22.6*  21.9*   PLT  220   --   156   --    --   154   --     < > = values in this interval not displayed. Recent Labs      08/13/18   1404  08/14/18   0546  08/15/18   0714   NA  136  142  142   K  4.0  4.2  4.0   CL  95*  106  106   CO2  30  29  28   BUN  17  18  13   CREATININE  1.1  0.9  0.9   CALCIUM  9.6  8.4  8.6   PHOS   --   3.0  2.4*     Recent Labs      08/13/18   1404   AST  16   ALT  9*   BILIDIR  <0.2   BILITOT  0.9   ALKPHOS  69     Recent Labs      08/13/18   2019   INR  1.13     No results for input(s): Daniela Parada in the last 72 hours. Urinalysis:    No results found for: Kartik Moore, BACTERIA, [address] [place] [address], [facility] [address], [facility] [address]    Radiology:  No orders to display       Assessment/Plan:    Lg Snyder is a 65 y.o. male w PMH diverticulosis, HTN, IDDM on insulin pump, who was admitted with BRBPR likely 2/2 diverticular bleed. Active Hospital Problems    Diagnosis Date Noted    Bright red rectal bleeding [K62.5] 08/13/2018     BRBPR - likely 2/2 diverticular bleed given rapid onset with no other symptoms and known sigmoid diverticulosis seen on colonoscopy after similar episode in 2015. VSS at this time despite several bloody BMs today. Hb at presentation stable at baseline around 12.  PT/INR, lactate, LFTs, renal panel largely wnl  - followed by GI Dr Lupe Loo, consulted, appreciate recs  - 2 large bore IVs   - orthostatics negative  - continue IV NS @ 100 ml/hr  - trend H&H q6h, transfuse pRBCs for Hb<7 or if symptomatic  - diet clear liquids  - hold home HTN meds     HTN   - BPs 150s/80s on presentation, now 120s-130s/50s, stable  - hold home hyzaar and lasix until bleeding stops     IDDM2 - well controlled on home insulin pump, last HbA1c 7.2 in Nov 2017  - keep patient on insulin pump per patient request  - clear liquids, if bleeding continue or worsen NPO  - Accuchecks 4 x daily  - hypoglycemic protocol     Remote hx of CAD and possibly CABG - from chart, patient may have had CABG in early 2000s. No recent ECHO results found.  - hold home aspirin  - consider ECHO once bleeding resolves  - monitor for fluid overload s/s  - pharm to med rec    DVT Prophylaxis: None due to active bleed  Diet: DIET CLEAR LIQUID;  Code Status: Full Code    PT/OT Eval Status: None     Dispo - General medical floor    Lyla Graf DO    I will discuss the patient with the senior resident and MD Lyla Sarah DO   Internal Medicine Resident PGY-1  Pager: [phone]    Patient seen and evaluated with the medical resident. I was physically present during the critical portions of the service when performed by the resident including the assessment and management of the patient. I agree with the findings and plans as described.     Briefly, hgb stable- cont trend, gi is following    Shilpa Stanford MD  Hospitalist  [phone] (pager)

## 2018-08-15 NOTE — PLAN OF CARE
Problem: Falls - Risk of:  Goal: Will remain free from falls  Will remain free from falls   Outcome: Met This Shift  No fall this shift   Goal: Absence of physical injury  Absence of physical injury   Outcome: Met This Shift  No physical injury this shift    Problem:  Bowel Function - Altered:  Intervention: Assess bowel function  No bowel aides needed   Intervention: Assess amount, characteristics and/or frequency of stool  None this shift   Intervention: Provide assistive devices  Independent after set up with cleaning supplies   Intervention: Bowel program specific to patient  Reg toileting schedule   Intervention: Perineal skin care  Independent   Intervention: Promote privacy while toileting  Independent in BR    Goal: Bowel elimination is within specified parameters  Bowel elimination is within specified parameters   Outcome: Ongoing  No diarrhea so far on this shift    Problem: Fluid Volume - Imbalance:  Intervention: Manage blood products  None this shift   Intervention: Fluid volume management  Measuring intake and output   Intervention: Assess signs and symptoms of dehydration  None of the above  Intervention: Assess signs and symptoms of fluid volume excess  None noted     Goal: Will show no signs and symptoms of excessive bleeding  Will show no signs and symptoms of excessive bleeding   Outcome: Ongoing  No excessive bleeding reported  Goal: Absence of imbalanced fluid volume signs and symptoms  Absence of imbalanced fluid volume signs and symptoms   Outcome: Ongoing  No signs of dehydration overload noted     Problem: Nausea/Vomiting:  Goal: Absence of nausea/vomiting  Absence of nausea/vomiting   Outcome: Met This Shift  No nausea or vomiting   Goal: Able to drink  Able to drink   Outcome: Met This Shift  Drinks independently   Goal: Able to eat  Able to eat   Outcome: Met This Shift  Eats independently after set up  Goal: Ability to achieve adequate nutritional intake will improve  Ability to achieve adequate nutritional intake will improve   Outcome: Ongoing  Pt encouraged to partake in all meals     Problem: Pain:  Goal: Pain level will decrease  Pain level will decrease   Outcome: Met This Shift  No pain reported this shift

## 2018-08-16 LAB
ALBUMIN SERPL-MCNC: 3.8 G/DL (ref 3.4–5)
ANION GAP SERPL CALCULATED.3IONS-SCNC: 8 MMOL/L (ref 3–16)
BASOPHILS ABSOLUTE: 0 K/UL (ref 0–0.2)
BASOPHILS RELATIVE PERCENT: 0.8 %
BLOOD BANK DISPENSE STATUS: NORMAL
BLOOD BANK PRODUCT CODE: NORMAL
BPU ID: NORMAL
BUN BLDV-MCNC: 10 MG/DL (ref 7–20)
CALCIUM SERPL-MCNC: 8.7 MG/DL (ref 8.3–10.6)
CHLORIDE BLD-SCNC: 107 MMOL/L (ref 99–110)
CO2: 26 MMOL/L (ref 21–32)
CREAT SERPL-MCNC: 0.9 MG/DL (ref 0.8–1.3)
DESCRIPTION BLOOD BANK: NORMAL
EOSINOPHILS ABSOLUTE: 0.1 K/UL (ref 0–0.6)
EOSINOPHILS RELATIVE PERCENT: 2.1 %
GFR AFRICAN AMERICAN: >60
GFR NON-AFRICAN AMERICAN: >60
GLUCOSE BLD-MCNC: 100 MG/DL (ref 70–99)
GLUCOSE BLD-MCNC: 134 MG/DL (ref 70–99)
GLUCOSE BLD-MCNC: 135 MG/DL (ref 70–99)
GLUCOSE BLD-MCNC: 137 MG/DL (ref 70–99)
GLUCOSE BLD-MCNC: 93 MG/DL (ref 70–99)
HCT VFR BLD CALC: 20.6 % (ref 40.5–52.5)
HCT VFR BLD CALC: 20.6 % (ref 40.5–52.5)
HCT VFR BLD CALC: 22.8 % (ref 40.5–52.5)
HEMOGLOBIN: 6.9 G/DL (ref 13.5–17.5)
HEMOGLOBIN: 6.9 G/DL (ref 13.5–17.5)
HEMOGLOBIN: 7.6 G/DL (ref 13.5–17.5)
LYMPHOCYTES ABSOLUTE: 1.2 K/UL (ref 1–5.1)
LYMPHOCYTES RELATIVE PERCENT: 20.4 %
MCH RBC QN AUTO: 29.4 PG (ref 26–34)
MCHC RBC AUTO-ENTMCNC: 33.5 G/DL (ref 31–36)
MCV RBC AUTO: 87.9 FL (ref 80–100)
MONOCYTES ABSOLUTE: 0.5 K/UL (ref 0–1.3)
MONOCYTES RELATIVE PERCENT: 8.5 %
NEUTROPHILS ABSOLUTE: 3.9 K/UL (ref 1.7–7.7)
NEUTROPHILS RELATIVE PERCENT: 68.2 %
PDW BLD-RTO: 14.3 % (ref 12.4–15.4)
PERFORMED ON: ABNORMAL
PERFORMED ON: NORMAL
PHOSPHORUS: 2.1 MG/DL (ref 2.5–4.9)
PLATELET # BLD: 176 K/UL (ref 135–450)
PMV BLD AUTO: 8.6 FL (ref 5–10.5)
POTASSIUM SERPL-SCNC: 3.7 MMOL/L (ref 3.5–5.1)
RBC # BLD: 2.6 M/UL (ref 4.2–5.9)
SODIUM BLD-SCNC: 141 MMOL/L (ref 136–145)
WBC # BLD: 5.7 K/UL (ref 4–11)

## 2018-08-16 PROCEDURE — 85025 COMPLETE CBC W/AUTO DIFF WBC: CPT

## 2018-08-16 PROCEDURE — 85014 HEMATOCRIT: CPT

## 2018-08-16 PROCEDURE — 6370000000 HC RX 637 (ALT 250 FOR IP): Performed by: STUDENT IN AN ORGANIZED HEALTH CARE EDUCATION/TRAINING PROGRAM

## 2018-08-16 PROCEDURE — 36415 COLL VENOUS BLD VENIPUNCTURE: CPT

## 2018-08-16 PROCEDURE — 85018 HEMOGLOBIN: CPT

## 2018-08-16 PROCEDURE — 2580000003 HC RX 258: Performed by: STUDENT IN AN ORGANIZED HEALTH CARE EDUCATION/TRAINING PROGRAM

## 2018-08-16 PROCEDURE — 80069 RENAL FUNCTION PANEL: CPT

## 2018-08-16 PROCEDURE — 86923 COMPATIBILITY TEST ELECTRIC: CPT

## 2018-08-16 PROCEDURE — 1200000000 HC SEMI PRIVATE

## 2018-08-16 PROCEDURE — 36430 TRANSFUSION BLD/BLD COMPNT: CPT

## 2018-08-16 PROCEDURE — P9016 RBC LEUKOCYTES REDUCED: HCPCS

## 2018-08-16 RX ORDER — 0.9 % SODIUM CHLORIDE 0.9 %
250 INTRAVENOUS SOLUTION INTRAVENOUS ONCE
Status: COMPLETED | OUTPATIENT
Start: 2018-08-16 | End: 2018-08-17

## 2018-08-16 RX ADMIN — TRAMADOL HYDROCHLORIDE 50 MG: 50 TABLET, FILM COATED ORAL at 18:56

## 2018-08-16 RX ADMIN — SODIUM CHLORIDE: 9 INJECTION, SOLUTION INTRAVENOUS at 11:15

## 2018-08-16 RX ADMIN — GABAPENTIN 600 MG: 600 TABLET, FILM COATED ORAL at 09:52

## 2018-08-16 RX ADMIN — SODIUM CHLORIDE: 9 INJECTION, SOLUTION INTRAVENOUS at 21:41

## 2018-08-16 RX ADMIN — CAPSAICIN: 0.25 CREAM TOPICAL at 11:15

## 2018-08-16 RX ADMIN — GABAPENTIN 600 MG: 600 TABLET, FILM COATED ORAL at 14:53

## 2018-08-16 RX ADMIN — SODIUM CHLORIDE 250 ML: 9 INJECTION, SOLUTION INTRAVENOUS at 23:22

## 2018-08-16 RX ADMIN — CAPSAICIN: 0.25 CREAM TOPICAL at 22:40

## 2018-08-16 RX ADMIN — GABAPENTIN 600 MG: 600 TABLET, FILM COATED ORAL at 22:39

## 2018-08-16 RX ADMIN — TRAMADOL HYDROCHLORIDE 50 MG: 50 TABLET, FILM COATED ORAL at 09:52

## 2018-08-16 ASSESSMENT — PAIN DESCRIPTION - PROGRESSION
CLINICAL_PROGRESSION: GRADUALLY WORSENING
CLINICAL_PROGRESSION: NOT CHANGED

## 2018-08-16 ASSESSMENT — PAIN DESCRIPTION - LOCATION
LOCATION: FOOT

## 2018-08-16 ASSESSMENT — PAIN DESCRIPTION - ONSET
ONSET: ON-GOING

## 2018-08-16 ASSESSMENT — PAIN SCALES - GENERAL
PAINLEVEL_OUTOF10: 7

## 2018-08-16 ASSESSMENT — PAIN DESCRIPTION - DESCRIPTORS
DESCRIPTORS: CONSTANT

## 2018-08-16 ASSESSMENT — PAIN DESCRIPTION - PAIN TYPE
TYPE: CHRONIC PAIN

## 2018-08-16 ASSESSMENT — PAIN DESCRIPTION - FREQUENCY
FREQUENCY: CONTINUOUS

## 2018-08-16 ASSESSMENT — PAIN DESCRIPTION - ORIENTATION
ORIENTATION: LEFT;RIGHT

## 2018-08-16 NOTE — PROGRESS NOTES
VSS benign abdomen. H/H 6.9 Plan 1. Agree with PRBC transfusion continue to monitor and gradual resolving diverticular bleed not brisk enough to do CTA.

## 2018-08-16 NOTE — PROGRESS NOTES
And    [START ON 8/19/2018] sodium chloride (PF)  5 mL Intravenous Daily     PRN Meds: traMADol, sodium chloride flush, magnesium hydroxide, ondansetron, glucose, dextrose, glucagon (rDNA), dextrose      Intake/Output Summary (Last 24 hours) at 08/16/18 1030  Last data filed at 08/15/18 2354   Gross per 24 hour   Intake              360 ml   Output              600 ml   Net             -240 ml       Physical Exam Performed:    /61   Pulse 71   Temp 98.5 °F (36.9 °C) (Oral)   Resp 18   Ht 5' 8\" (1.727 m)   Wt 199 lb 1.2 oz (90.3 kg)   SpO2 96%   BMI 30.27 kg/m²     Physical Exam   Constitutional: He is oriented to person, place, and time. He appears well-developed and well-nourished. HENT:   Head: Normocephalic and atraumatic. Eyes: Conjunctivae and EOM are normal.   Cardiovascular: Normal rate, regular rhythm and intact distal pulses. Exam reveals no gallop and no friction rub. No murmur heard. Pulmonary/Chest: Effort normal and breath sounds normal. No respiratory distress. He has no wheezes. He has no rales. Abdominal: Soft. Bowel sounds are normal. He exhibits no distension. There is tenderness. There is no rebound and no guarding. Neurological: He is alert and oriented to person, place, and time. Skin: He is not diaphoretic. Labs:   Recent Labs      08/14/18   0546   08/15/18   0714   08/15/18   2050  08/16/18   0308  08/16/18   0727   WBC  6.3   --   5.6   --    --    --   5.7   HGB  8.3*   < >  7.7*   < >  7.2*  6.9*  7.6*   HCT  24.6*   < >  22.6*   < >  21.3*  20.6*  22.8*   PLT  156   --   154   --    --    --   176    < > = values in this interval not displayed.      Recent Labs      08/14/18   0546  08/15/18   0714  08/16/18   0728   NA  142  142  141   K  4.2  4.0  3.7   CL  106  106  107   CO2  29  28  26   BUN  18  13  10   CREATININE  0.9  0.9  0.9   CALCIUM  8.4  8.6  8.7   PHOS  3.0  2.4*  2.1*     Recent Labs      08/13/18   1404   AST  16   ALT  9*   BILIDIR MD Blessing Noble MD   Internal Medicine Resident PGY-1  Pager: [phone]

## 2018-08-17 ENCOUNTER — APPOINTMENT (OUTPATIENT)
Dept: CT IMAGING | Age: 78
DRG: 378 | End: 2018-08-17
Payer: MEDICARE

## 2018-08-17 LAB
ABO/RH: NORMAL
ALBUMIN SERPL-MCNC: 3.5 G/DL (ref 3.4–5)
ANION GAP SERPL CALCULATED.3IONS-SCNC: 8 MMOL/L (ref 3–16)
ANTIBODY SCREEN: NORMAL
BASOPHILS ABSOLUTE: 0 K/UL (ref 0–0.2)
BASOPHILS RELATIVE PERCENT: 0.6 %
BUN BLDV-MCNC: 8 MG/DL (ref 7–20)
CALCIUM SERPL-MCNC: 8.2 MG/DL (ref 8.3–10.6)
CHLORIDE BLD-SCNC: 110 MMOL/L (ref 99–110)
CO2: 27 MMOL/L (ref 21–32)
CREAT SERPL-MCNC: 0.9 MG/DL (ref 0.8–1.3)
EOSINOPHILS ABSOLUTE: 0.2 K/UL (ref 0–0.6)
EOSINOPHILS RELATIVE PERCENT: 2.9 %
GFR AFRICAN AMERICAN: >60
GFR NON-AFRICAN AMERICAN: >60
GLUCOSE BLD-MCNC: 103 MG/DL (ref 70–99)
GLUCOSE BLD-MCNC: 108 MG/DL (ref 70–99)
GLUCOSE BLD-MCNC: 116 MG/DL (ref 70–99)
GLUCOSE BLD-MCNC: 138 MG/DL (ref 70–99)
GLUCOSE BLD-MCNC: 175 MG/DL (ref 70–99)
GLUCOSE BLD-MCNC: 46 MG/DL (ref 70–99)
GLUCOSE BLD-MCNC: 65 MG/DL (ref 70–99)
GLUCOSE BLD-MCNC: 75 MG/DL (ref 70–99)
GLUCOSE BLD-MCNC: 99 MG/DL (ref 70–99)
HCT VFR BLD CALC: 23.2 % (ref 40.5–52.5)
HCT VFR BLD CALC: 24.4 % (ref 40.5–52.5)
HCT VFR BLD CALC: 26.9 % (ref 40.5–52.5)
HEMOGLOBIN: 7.9 G/DL (ref 13.5–17.5)
HEMOGLOBIN: 8.3 G/DL (ref 13.5–17.5)
HEMOGLOBIN: 9 G/DL (ref 13.5–17.5)
LYMPHOCYTES ABSOLUTE: 1.2 K/UL (ref 1–5.1)
LYMPHOCYTES RELATIVE PERCENT: 21.9 %
MCH RBC QN AUTO: 30 PG (ref 26–34)
MCHC RBC AUTO-ENTMCNC: 34 G/DL (ref 31–36)
MCV RBC AUTO: 88.1 FL (ref 80–100)
MONOCYTES ABSOLUTE: 0.5 K/UL (ref 0–1.3)
MONOCYTES RELATIVE PERCENT: 9 %
NEUTROPHILS ABSOLUTE: 3.7 K/UL (ref 1.7–7.7)
NEUTROPHILS RELATIVE PERCENT: 65.6 %
PDW BLD-RTO: 14.7 % (ref 12.4–15.4)
PERFORMED ON: ABNORMAL
PERFORMED ON: NORMAL
PERFORMED ON: NORMAL
PHOSPHORUS: 2.9 MG/DL (ref 2.5–4.9)
PLATELET # BLD: 157 K/UL (ref 135–450)
PMV BLD AUTO: 8.9 FL (ref 5–10.5)
POTASSIUM SERPL-SCNC: 3.7 MMOL/L (ref 3.5–5.1)
RBC # BLD: 2.63 M/UL (ref 4.2–5.9)
SODIUM BLD-SCNC: 145 MMOL/L (ref 136–145)
WBC # BLD: 5.6 K/UL (ref 4–11)

## 2018-08-17 PROCEDURE — 1200000000 HC SEMI PRIVATE

## 2018-08-17 PROCEDURE — 2580000003 HC RX 258: Performed by: STUDENT IN AN ORGANIZED HEALTH CARE EDUCATION/TRAINING PROGRAM

## 2018-08-17 PROCEDURE — 85014 HEMATOCRIT: CPT

## 2018-08-17 PROCEDURE — 86850 RBC ANTIBODY SCREEN: CPT

## 2018-08-17 PROCEDURE — 85025 COMPLETE CBC W/AUTO DIFF WBC: CPT

## 2018-08-17 PROCEDURE — 86923 COMPATIBILITY TEST ELECTRIC: CPT

## 2018-08-17 PROCEDURE — 80069 RENAL FUNCTION PANEL: CPT

## 2018-08-17 PROCEDURE — 36415 COLL VENOUS BLD VENIPUNCTURE: CPT

## 2018-08-17 PROCEDURE — 85018 HEMOGLOBIN: CPT

## 2018-08-17 PROCEDURE — 86900 BLOOD TYPING SEROLOGIC ABO: CPT

## 2018-08-17 PROCEDURE — 70450 CT HEAD/BRAIN W/O DYE: CPT

## 2018-08-17 PROCEDURE — 6370000000 HC RX 637 (ALT 250 FOR IP): Performed by: STUDENT IN AN ORGANIZED HEALTH CARE EDUCATION/TRAINING PROGRAM

## 2018-08-17 PROCEDURE — 86901 BLOOD TYPING SEROLOGIC RH(D): CPT

## 2018-08-17 RX ORDER — DEXTROSE MONOHYDRATE 25 G/50ML
25 INJECTION, SOLUTION INTRAVENOUS PRN
Status: DISCONTINUED | OUTPATIENT
Start: 2018-08-17 | End: 2018-08-19 | Stop reason: HOSPADM

## 2018-08-17 RX ADMIN — Medication 10 ML: at 21:10

## 2018-08-17 RX ADMIN — DEXTROSE MONOHYDRATE 100 ML/HR: 50 INJECTION, SOLUTION INTRAVENOUS at 22:42

## 2018-08-17 RX ADMIN — GABAPENTIN 600 MG: 600 TABLET, FILM COATED ORAL at 09:10

## 2018-08-17 RX ADMIN — GABAPENTIN 600 MG: 600 TABLET, FILM COATED ORAL at 15:09

## 2018-08-17 RX ADMIN — GABAPENTIN 600 MG: 600 TABLET, FILM COATED ORAL at 21:09

## 2018-08-17 RX ADMIN — CAPSAICIN: 0.25 CREAM TOPICAL at 09:12

## 2018-08-17 RX ADMIN — DEXTROSE MONOHYDRATE 12.5 G: 25 INJECTION, SOLUTION INTRAVENOUS at 22:38

## 2018-08-17 RX ADMIN — CAPSAICIN: 0.25 CREAM TOPICAL at 21:09

## 2018-08-18 LAB
ALBUMIN SERPL-MCNC: 3.4 G/DL (ref 3.4–5)
ANION GAP SERPL CALCULATED.3IONS-SCNC: 9 MMOL/L (ref 3–16)
BASOPHILS ABSOLUTE: 0 K/UL (ref 0–0.2)
BASOPHILS RELATIVE PERCENT: 0.6 %
BLOOD BANK DISPENSE STATUS: NORMAL
BLOOD BANK DISPENSE STATUS: NORMAL
BLOOD BANK PRODUCT CODE: NORMAL
BLOOD BANK PRODUCT CODE: NORMAL
BPU ID: NORMAL
BPU ID: NORMAL
BUN BLDV-MCNC: 6 MG/DL (ref 7–20)
CALCIUM SERPL-MCNC: 8.2 MG/DL (ref 8.3–10.6)
CHLORIDE BLD-SCNC: 109 MMOL/L (ref 99–110)
CO2: 25 MMOL/L (ref 21–32)
CREAT SERPL-MCNC: 0.8 MG/DL (ref 0.8–1.3)
DESCRIPTION BLOOD BANK: NORMAL
DESCRIPTION BLOOD BANK: NORMAL
EOSINOPHILS ABSOLUTE: 0.2 K/UL (ref 0–0.6)
EOSINOPHILS RELATIVE PERCENT: 3.3 %
GFR AFRICAN AMERICAN: >60
GFR NON-AFRICAN AMERICAN: >60
GLUCOSE BLD-MCNC: 108 MG/DL (ref 70–99)
GLUCOSE BLD-MCNC: 130 MG/DL (ref 70–99)
GLUCOSE BLD-MCNC: 149 MG/DL (ref 70–99)
GLUCOSE BLD-MCNC: 149 MG/DL (ref 70–99)
GLUCOSE BLD-MCNC: 150 MG/DL (ref 70–99)
GLUCOSE BLD-MCNC: 154 MG/DL (ref 70–99)
GLUCOSE BLD-MCNC: 158 MG/DL (ref 70–99)
GLUCOSE BLD-MCNC: 174 MG/DL (ref 70–99)
GLUCOSE BLD-MCNC: 192 MG/DL (ref 70–99)
GLUCOSE BLD-MCNC: 62 MG/DL (ref 70–99)
GLUCOSE BLD-MCNC: 89 MG/DL (ref 70–99)
HCT VFR BLD CALC: 22.6 % (ref 40.5–52.5)
HCT VFR BLD CALC: 22.7 % (ref 40.5–52.5)
HCT VFR BLD CALC: 26.3 % (ref 40.5–52.5)
HEMOGLOBIN: 7.7 G/DL (ref 13.5–17.5)
HEMOGLOBIN: 7.7 G/DL (ref 13.5–17.5)
HEMOGLOBIN: 8.9 G/DL (ref 13.5–17.5)
LYMPHOCYTES ABSOLUTE: 1.4 K/UL (ref 1–5.1)
LYMPHOCYTES RELATIVE PERCENT: 23.8 %
MCH RBC QN AUTO: 30.4 PG (ref 26–34)
MCHC RBC AUTO-ENTMCNC: 34 G/DL (ref 31–36)
MCV RBC AUTO: 89.5 FL (ref 80–100)
MONOCYTES ABSOLUTE: 0.5 K/UL (ref 0–1.3)
MONOCYTES RELATIVE PERCENT: 8.6 %
NEUTROPHILS ABSOLUTE: 3.9 K/UL (ref 1.7–7.7)
NEUTROPHILS RELATIVE PERCENT: 63.7 %
PDW BLD-RTO: 14.7 % (ref 12.4–15.4)
PERFORMED ON: ABNORMAL
PERFORMED ON: NORMAL
PHOSPHORUS: 2.8 MG/DL (ref 2.5–4.9)
PLATELET # BLD: 175 K/UL (ref 135–450)
PMV BLD AUTO: 9 FL (ref 5–10.5)
POTASSIUM SERPL-SCNC: 3.7 MMOL/L (ref 3.5–5.1)
RBC # BLD: 2.53 M/UL (ref 4.2–5.9)
SODIUM BLD-SCNC: 143 MMOL/L (ref 136–145)
WBC # BLD: 6.1 K/UL (ref 4–11)

## 2018-08-18 PROCEDURE — 80069 RENAL FUNCTION PANEL: CPT

## 2018-08-18 PROCEDURE — 85014 HEMATOCRIT: CPT

## 2018-08-18 PROCEDURE — 85018 HEMOGLOBIN: CPT

## 2018-08-18 PROCEDURE — 2580000003 HC RX 258: Performed by: STUDENT IN AN ORGANIZED HEALTH CARE EDUCATION/TRAINING PROGRAM

## 2018-08-18 PROCEDURE — 36415 COLL VENOUS BLD VENIPUNCTURE: CPT

## 2018-08-18 PROCEDURE — 85025 COMPLETE CBC W/AUTO DIFF WBC: CPT

## 2018-08-18 PROCEDURE — 93005 ELECTROCARDIOGRAM TRACING: CPT | Performed by: INTERNAL MEDICINE

## 2018-08-18 PROCEDURE — 1200000000 HC SEMI PRIVATE

## 2018-08-18 PROCEDURE — 6370000000 HC RX 637 (ALT 250 FOR IP): Performed by: STUDENT IN AN ORGANIZED HEALTH CARE EDUCATION/TRAINING PROGRAM

## 2018-08-18 RX ADMIN — Medication 10 ML: at 08:31

## 2018-08-18 RX ADMIN — CAPSAICIN: 0.25 CREAM TOPICAL at 08:31

## 2018-08-18 RX ADMIN — GABAPENTIN 600 MG: 600 TABLET, FILM COATED ORAL at 08:31

## 2018-08-18 RX ADMIN — GABAPENTIN 600 MG: 600 TABLET, FILM COATED ORAL at 14:55

## 2018-08-18 RX ADMIN — CAPSAICIN: 0.25 CREAM TOPICAL at 21:30

## 2018-08-18 RX ADMIN — GABAPENTIN 600 MG: 600 TABLET, FILM COATED ORAL at 21:33

## 2018-08-18 RX ADMIN — Medication 10 ML: at 21:31

## 2018-08-18 ASSESSMENT — PAIN SCALES - GENERAL: PAINLEVEL_OUTOF10: 0

## 2018-08-18 NOTE — PROGRESS NOTES
Patient experienced vision changes around 2200. Patient denied dizziness at the time and vitals remained stable. Resident on call was notified and ordered patient to have CT imaging of brain. Patient stated vision returned to normal and did not experience any other neurological deficits.

## 2018-08-18 NOTE — PROGRESS NOTES
Pt noted with 30+ beats SVT on tele monitor. Pt resting in recliner, denies CP, SOB, palpitations; no c/o of any at this time. VSS. Dr Massiel Rock notified, STAT EKG ordered. Will cont to monitor.

## 2018-08-19 VITALS
SYSTOLIC BLOOD PRESSURE: 152 MMHG | TEMPERATURE: 98.6 F | RESPIRATION RATE: 18 BRPM | BODY MASS INDEX: 30.17 KG/M2 | HEIGHT: 68 IN | OXYGEN SATURATION: 94 % | HEART RATE: 57 BPM | DIASTOLIC BLOOD PRESSURE: 58 MMHG | WEIGHT: 199.08 LBS

## 2018-08-19 LAB
ALBUMIN SERPL-MCNC: 3.5 G/DL (ref 3.4–5)
ANION GAP SERPL CALCULATED.3IONS-SCNC: 10 MMOL/L (ref 3–16)
BASOPHILS ABSOLUTE: 0 K/UL (ref 0–0.2)
BASOPHILS RELATIVE PERCENT: 0.7 %
BUN BLDV-MCNC: 5 MG/DL (ref 7–20)
CALCIUM SERPL-MCNC: 8.7 MG/DL (ref 8.3–10.6)
CHLORIDE BLD-SCNC: 109 MMOL/L (ref 99–110)
CO2: 26 MMOL/L (ref 21–32)
CREAT SERPL-MCNC: 0.9 MG/DL (ref 0.8–1.3)
EOSINOPHILS ABSOLUTE: 0.2 K/UL (ref 0–0.6)
EOSINOPHILS RELATIVE PERCENT: 3.2 %
GFR AFRICAN AMERICAN: >60
GFR NON-AFRICAN AMERICAN: >60
GLUCOSE BLD-MCNC: 120 MG/DL (ref 70–99)
GLUCOSE BLD-MCNC: 153 MG/DL (ref 70–99)
GLUCOSE BLD-MCNC: 252 MG/DL (ref 70–99)
HCT VFR BLD CALC: 22.9 % (ref 40.5–52.5)
HCT VFR BLD CALC: 23.9 % (ref 40.5–52.5)
HEMOGLOBIN: 7.8 G/DL (ref 13.5–17.5)
HEMOGLOBIN: 8 G/DL (ref 13.5–17.5)
LYMPHOCYTES ABSOLUTE: 1.3 K/UL (ref 1–5.1)
LYMPHOCYTES RELATIVE PERCENT: 19.5 %
MCH RBC QN AUTO: 30.2 PG (ref 26–34)
MCHC RBC AUTO-ENTMCNC: 34.1 G/DL (ref 31–36)
MCV RBC AUTO: 88.5 FL (ref 80–100)
MONOCYTES ABSOLUTE: 0.5 K/UL (ref 0–1.3)
MONOCYTES RELATIVE PERCENT: 8.2 %
NEUTROPHILS ABSOLUTE: 4.5 K/UL (ref 1.7–7.7)
NEUTROPHILS RELATIVE PERCENT: 68.4 %
PDW BLD-RTO: 14.8 % (ref 12.4–15.4)
PERFORMED ON: ABNORMAL
PERFORMED ON: ABNORMAL
PHOSPHORUS: 3.4 MG/DL (ref 2.5–4.9)
PLATELET # BLD: 183 K/UL (ref 135–450)
PMV BLD AUTO: 9.1 FL (ref 5–10.5)
POTASSIUM SERPL-SCNC: 3.8 MMOL/L (ref 3.5–5.1)
RBC # BLD: 2.58 M/UL (ref 4.2–5.9)
SODIUM BLD-SCNC: 145 MMOL/L (ref 136–145)
WBC # BLD: 6.6 K/UL (ref 4–11)

## 2018-08-19 PROCEDURE — 85025 COMPLETE CBC W/AUTO DIFF WBC: CPT

## 2018-08-19 PROCEDURE — 80069 RENAL FUNCTION PANEL: CPT

## 2018-08-19 PROCEDURE — 36415 COLL VENOUS BLD VENIPUNCTURE: CPT

## 2018-08-19 PROCEDURE — 2500000003 HC RX 250 WO HCPCS: Performed by: STUDENT IN AN ORGANIZED HEALTH CARE EDUCATION/TRAINING PROGRAM

## 2018-08-19 PROCEDURE — 2580000003 HC RX 258: Performed by: STUDENT IN AN ORGANIZED HEALTH CARE EDUCATION/TRAINING PROGRAM

## 2018-08-19 PROCEDURE — 6370000000 HC RX 637 (ALT 250 FOR IP): Performed by: STUDENT IN AN ORGANIZED HEALTH CARE EDUCATION/TRAINING PROGRAM

## 2018-08-19 RX ADMIN — Medication 10 ML: at 08:18

## 2018-08-19 RX ADMIN — LEVOTHYROXINE SODIUM ANHYDROUS 56 MCG: 100 INJECTION, POWDER, LYOPHILIZED, FOR SOLUTION INTRAVENOUS at 08:22

## 2018-08-19 RX ADMIN — Medication 5 ML: at 08:23

## 2018-08-19 RX ADMIN — CAPSAICIN: 0.25 CREAM TOPICAL at 08:18

## 2018-08-19 RX ADMIN — GABAPENTIN 600 MG: 600 TABLET, FILM COATED ORAL at 08:18

## 2018-08-19 NOTE — PROGRESS NOTES
Pt d/c to home by self via private vehicle. AVS reviewed and signed by pt; no questions/ concerns at this time. All personal belongings taken from room by pt. Pt escorted by wheelchair to main entrance and drove self home.

## 2018-08-19 NOTE — DISCHARGE SUMMARY
Resp 18   Ht 5' 8\" (1.727 m)   Wt 199 lb 1.2 oz (90.3 kg)   SpO2 94%   BMI 30.27 kg/m²       General appearance:  No apparent distress, appears stated age and cooperative. HEENT:  Normal cephalic, atraumatic without obvious deformity. Pupils equal, round, and reactive to light. Extra ocular muscles intact. Conjunctivae/corneas clear. Neck: Supple, with full range of motion. No jugular venous distention. Trachea midline. Respiratory:  Normal respiratory effort. Clear to auscultation, bilaterally without Rales/Wheezes/Rhonchi. Cardiovascular:  Regular rate and rhythm with normal S1/S2 without murmurs, rubs or gallops. Abdomen: Soft, non-tender, non-distended with normal bowel sounds. Musculoskeletal:  No clubbing, cyanosis or edema bilaterally. Full range of motion without deformity. Skin: Skin color, texture, turgor normal.  No rashes or lesions. Neurologic:  Neurovascularly intact without any focal sensory/motor deficits. Cranial nerves: II-XII intact, grossly non-focal.  Psychiatric:  Alert and oriented, thought content appropriate, normal insight  Capillary Refill: Brisk,< 3 seconds   Peripheral Pulses: +2 palpable, equal bilaterally       Labs: For convenience and continuity at follow-up the following most recent labs are provided:      CBC:    Lab Results   Component Value Date    WBC 6.6 08/19/2018    HGB 7.8 08/19/2018    HCT 22.9 08/19/2018     08/19/2018       Renal:    Lab Results   Component Value Date     08/19/2018    K 3.8 08/19/2018    K 4.0 08/13/2018     08/19/2018    CO2 26 08/19/2018    BUN 5 08/19/2018    CREATININE 0.9 08/19/2018    CALCIUM 8.7 08/19/2018    PHOS 3.4 08/19/2018         Significant Diagnostic Studies    Radiology:   CT HEAD WO CONTRAST   Final Result      1. No findings for acute intracranial abnormality. 2.  Age-related atrophy with mild periventricular white matter changes consistent with chronic small vessel ischemia.

## 2018-08-19 NOTE — PROGRESS NOTES
Internal Medicine PGY-1 Resident Progress Note        PCP: Vonda Villalba MD    Date of Admission: 8/13/2018    Chief Complaint: BRPR    Subjective:   No HA, lightheadedness, dizziness or blurry vision. No CP, chest tightness, or palpitations. No abdominal pain, no bleeding in the stool, no haematuria, no dysuria, no itching or burning on urination. No SOB, BLACKMAN. No sweating or chills overnight    Abdominal pain from admission at [facility] has resolve. And he no longer feels any pain. Hb stable at 7.6 (6.9 yesterday)    Per GI: acute GI bleed 2/2 diverticular disease; no plans for immediate colonoscopy. If rebleed consider possible CTA and embolization. Hospital course:  65 y.o. M w/ PMH IDDM, HTN, diverticulosis (2015), p/w 1 day of 4 episodes of large volume BRBPR with clots accompanied by dizziness exacerbated with movement and also occasional chills. He had no symptoms the prior day. He denied any fevers, episodes of LOC, n/v, prior diarrhea, recent infections or other recent issues with his health. He reported mild abdominal \"soreness\" around his belt line, not exacerbated or alleviated by anything in particular. He reports that he remembers one prior episode similar to this around 1-2 years ago, and recalls vaguely that he was told he had diverticulosis. He also recalls having diverticulitis around 30 years ago, which he reports being very painful without bleeding, and much worse than what he was experiencing at this point. He has had hemorrhoids in the past but reports he always had them removed, and when they bled it was never this much.       In the ED, patient got 1 L IV NS, his VS remained stable.     Received 1 U PRBC 8/17/18    Medications:  Reviewed    Infusion Medications    Insulin Pump - insulin regular U-500 (CONC)      dextrose 100 mL/hr (08/17/18 4122)     Scheduled Medications    capsaicin   Topical BID    gabapentin  600 mg Oral TID    sodium chloride flush  10 mL Intravenous 2 times per day AST, ALT, BILIDIR, BILITOT, ALKPHOS in the last 72 hours. No results for input(s): INR in the last 72 hours. No results for input(s): Jyoti Pines in the last 72 hours. Urinalysis:    No results found for: Alina Jessica, BACTERIA, RBCUA, BLOODU, [address], [address]    Radiology:  CT HEAD WO CONTRAST   Final Result      1. No findings for acute intracranial abnormality. 2.  Age-related atrophy with mild periventricular white matter changes consistent with chronic small vessel ischemia. Assessment/Plan:    Megan Monae is a 65 y.o. male w PMH diverticulosis, HTN, IDDM on insulin pump, who was admitted with BRBPR likely 2/2 diverticular bleed. Active Hospital Problems    Diagnosis Date Noted    Bright red rectal bleeding [K62.5] 08/13/2018     Acute GI bleed 2/2 diverticular disease - rapid onset w/o symptoms + Hx sigmoid diverticulosis (ID'ed on colonoscopy 2015). Hb at presentation stable at baseline around 12. PT/INR, lactate, LFTs, renal panel largely wnl  - followed by GI Dr Denise Crockett: acute GI bleed 2/2 diverticular disease; no plans for immediate colonoscopy. If rebleed consider possible CTA and embolization. - orthostatics negative  - continue IV NS @ 100 ml/hr  - trend H&H q6h, transfuse pRBCs for Hb<7 or if symptomatic  - diet clear liquids  - hold home HTN meds  - consider d/c today or tomorrow     HTN   - BPs 150s/80s on presentation, now 120s-130s/50s, stable  - hold home hyzaar and lasix until bleeding stops     IDDM2 - well controlled on home insulin pump, last HbA1c 7.2 in Nov 2017  - keep patient on insulin pump per patient request  - clear liquids, if bleeding continue or worsen NPO  - Accuchecks 4 x daily  - hypoglycemic protocol     Remote hx of CAD and possibly CABG - from chart, patient may have had CABG in early 2000s.  No recent ECHO results found.  - hold home aspirin  - consider ECHO once bleeding resolves  - monitor for fluid overload s/s  - pharm to med rec    DVT Prophylaxis: None due to active bleed  Diet: DIET FULL LIQUID;  Code Status: Full Code    PT/OT Eval Status: None     Dispo - General medical floor    Vic Escobar MD    I will discuss the patient with the senior resident and MD Vic Chow MD   Internal Medicine Resident PGY-1  Pager: [phone]

## 2018-08-19 NOTE — PROGRESS NOTES
Accucheck blood sugar 62. Awake and alert. Pt removed insulin pump yesterday. Refuses Glutose gel. Given popscicle and [place] ice. Will recheck blood sugar and continue to monitor.

## 2018-08-20 LAB
BLOOD BANK DISPENSE STATUS: NORMAL
BLOOD BANK PRODUCT CODE: NORMAL
BPU ID: NORMAL
DESCRIPTION BLOOD BANK: NORMAL

## 2018-08-20 PROCEDURE — 93010 ELECTROCARDIOGRAM REPORT: CPT | Performed by: INTERNAL MEDICINE

## 2018-08-21 LAB
EKG ATRIAL RATE: 58 BPM
EKG DIAGNOSIS: NORMAL
EKG P AXIS: -12 DEGREES
EKG P-R INTERVAL: 140 MS
EKG Q-T INTERVAL: 424 MS
EKG QRS DURATION: 80 MS
EKG QTC CALCULATION (BAZETT): 416 MS
EKG R AXIS: 7 DEGREES
EKG T AXIS: 83 DEGREES
EKG VENTRICULAR RATE: 58 BPM

## 2019-05-06 ENCOUNTER — HOSPITAL ENCOUNTER (OUTPATIENT)
Age: 79
Setting detail: OUTPATIENT SURGERY
Discharge: HOME | End: 2019-05-06
Attending: INTERNAL MEDICINE | Admitting: INTERNAL MEDICINE
Payer: MEDICARE

## 2019-05-06 VITALS
TEMPERATURE: 97.3 F | HEIGHT: 68 IN | WEIGHT: 200 LBS | BODY MASS INDEX: 30.31 KG/M2 | SYSTOLIC BLOOD PRESSURE: 122 MMHG | HEART RATE: 56 BPM | RESPIRATION RATE: 18 BRPM | DIASTOLIC BLOOD PRESSURE: 47 MMHG | OXYGEN SATURATION: 96 %

## 2019-05-06 LAB
GLUCOSE BLD-MCNC: 166 MG/DL (ref 70–99)
GLUCOSE BLD-MCNC: 219 MG/DL (ref 70–99)
GLUCOSE BLD-MCNC: 238 MG/DL (ref 70–99)
PERFORMED ON: ABNORMAL

## 2019-05-06 PROCEDURE — 88305 TISSUE EXAM BY PATHOLOGIST: CPT

## 2019-05-06 PROCEDURE — 99153 MOD SED SAME PHYS/QHP EA: CPT | Performed by: INTERNAL MEDICINE

## 2019-05-06 PROCEDURE — 7100000010 HC PHASE II RECOVERY - FIRST 15 MIN: Performed by: INTERNAL MEDICINE

## 2019-05-06 PROCEDURE — 6360000002 HC RX W HCPCS: Performed by: INTERNAL MEDICINE

## 2019-05-06 PROCEDURE — 99152 MOD SED SAME PHYS/QHP 5/>YRS: CPT | Performed by: INTERNAL MEDICINE

## 2019-05-06 PROCEDURE — 7100000011 HC PHASE II RECOVERY - ADDTL 15 MIN: Performed by: INTERNAL MEDICINE

## 2019-05-06 PROCEDURE — 2709999900 HC NON-CHARGEABLE SUPPLY: Performed by: INTERNAL MEDICINE

## 2019-05-06 PROCEDURE — 3609010300 HC COLONOSCOPY W/BIOPSY SINGLE/MULTIPLE: Performed by: INTERNAL MEDICINE

## 2019-05-06 RX ORDER — MEPERIDINE HYDROCHLORIDE 50 MG/ML
INJECTION INTRAMUSCULAR; INTRAVENOUS; SUBCUTANEOUS PRN
Status: DISCONTINUED | OUTPATIENT
Start: 2019-05-06 | End: 2019-05-06 | Stop reason: ALTCHOICE

## 2019-05-06 RX ORDER — ACETAMINOPHEN 500 MG
500 TABLET ORAL EVERY 6 HOURS PRN
COMMUNITY
Start: 2017-06-07 | End: 2020-11-27 | Stop reason: ALTCHOICE

## 2019-05-06 RX ORDER — PEN NEEDLE, DIABETIC 31 GX5/16"
NEEDLE, DISPOSABLE MISCELLANEOUS
COMMUNITY
Start: 2019-02-20

## 2019-05-06 RX ORDER — MIDAZOLAM HYDROCHLORIDE 1 MG/ML
INJECTION INTRAMUSCULAR; INTRAVENOUS PRN
Status: DISCONTINUED | OUTPATIENT
Start: 2019-05-06 | End: 2019-05-06 | Stop reason: ALTCHOICE

## 2019-05-06 ASSESSMENT — PAIN SCALES - GENERAL: PAINLEVEL_OUTOF10: 0

## 2019-05-06 ASSESSMENT — PAIN - FUNCTIONAL ASSESSMENT: PAIN_FUNCTIONAL_ASSESSMENT: 0-10

## 2019-05-06 NOTE — BRIEF OP NOTE
Brief Postoperative Note  ______________________________________________________________    Patient: Essence Long  YOB: 1940  MRN: [id number]  Date of Procedure: 5/6/2019    Pre-Op Diagnosis: RECTAL BLEEDING  K62.5. Post-Op Diagnosis: Same       Procedure(s):  COLONOSCOPY WITH BIOPSY    Anesthesia: Anesthesia type not filed in the log.     Surgeon(s):  Kristin Donovan MD    Assistant:     Estimated Blood Loss (mL): less than 50     Complications: None    Specimens:   ID Type Source Tests Collected by Time Destination   A : BX ASCENDING COLON POLYP Tissue Colon SURGICAL PATHOLOGY Kristin Donovan MD 5/6/2019 1136        Implants:  * No implants in log *      Drains: * No LDAs found *    Findings: polyp    Martinez Amanda MD  Date: 5/6/2019  Time: 11:44 AM

## 2019-05-06 NOTE — PROGRESS NOTES
Abdomen soft, patient passing gas. Patient able to tolerate PO fluids. Patient denies pain, nausea or dizziness. IV removed and bandage applied. After visit summary discussed with patient and family member, family member to take patient home. Patient taken to discharge area via wheelchair.

## 2019-05-07 NOTE — PROCEDURES
[facility] [address]                                 PROCEDURE NOTE    PATIENT NAME: Ly Foster                    :        1940  MED REC NO:   [id number]                          ROOM:  ACCOUNT NO:   [de-identified]                           ADMIT DATE: 2019  PROVIDER:     Bijal Dudley MD    DATE OF PROCEDURE:  2019    PREOPERATIVE DIAGNOSES:  Evaluation for history of colon polyps and  rectal bleeding. POSTOPERATIVE DIAGNOSIS:  1. Hemorrhoids. 2.  Diverticulosis. 3.  Ascending colon polyps. PROCEDURE:  Colonoscopy with biopsy. ANESTHESIA:  Demerol 50 mg, Versed 5 mg. COMPLICATIONS:  None. DESCRIPTION OF PROCEDURE:  Informed consent was obtained after  explaining the risks of bleeding, infection, allergy, perforation,  medical and surgical management, as well as non-detection of any colonic  neoplasia. Colonoscope through the anus, advanced to the cecum, was  normal.  Ascending, transverse, descending, and sigmoid colon  demonstrates ascending colon polyp removed with forceps technique and  pancolonic diverticulosis. Retroflexion in the rectal vault revealed  hypertrophied anal papillae and hemorrhoids. Recovery room in a stable  condition. IMPRESSION AND PLAN:  We will notify the patient of the biopsy results. If colonic adenomas, surveillance colonoscopy in three years. Further  recommendations after clinical correlation.         Dorian Pérez MD    D: 2019 11:46:47       T: 2019 13:31:30     [email or web address]  Job#: [id number]     Doc#: [id number]    CC:  Jose Harden MD

## 2020-04-24 ENCOUNTER — HOSPITAL ENCOUNTER (INPATIENT)
Age: 80
LOS: 2 days | Discharge: HOME | DRG: 378 | End: 2020-04-26
Attending: EMERGENCY MEDICINE | Admitting: INTERNAL MEDICINE
Payer: MEDICARE

## 2020-04-24 PROBLEM — K92.2 GIB (GASTROINTESTINAL BLEEDING): Status: ACTIVE | Noted: 2020-04-24

## 2020-04-24 LAB
ABO/RH: NORMAL
ALBUMIN SERPL-MCNC: 4.6 G/DL (ref 3.4–5)
ALP BLD-CCNC: 67 U/L (ref 40–129)
ALT SERPL-CCNC: 15 U/L (ref 10–40)
ANION GAP SERPL CALCULATED.3IONS-SCNC: 12 MMOL/L (ref 3–16)
ANTIBODY SCREEN: NORMAL
APTT: 31.7 SEC (ref 24.2–36.2)
AST SERPL-CCNC: 19 U/L (ref 15–37)
BASE EXCESS VENOUS: 4.4 MMOL/L (ref -2–3)
BASOPHILS ABSOLUTE: 0.1 K/UL (ref 0–0.2)
BASOPHILS RELATIVE PERCENT: 1.2 %
BILIRUB SERPL-MCNC: 0.8 MG/DL (ref 0–1)
BILIRUBIN DIRECT: <0.2 MG/DL (ref 0–0.3)
BILIRUBIN, INDIRECT: NORMAL MG/DL (ref 0–1)
BUN BLDV-MCNC: 18 MG/DL (ref 7–20)
CALCIUM SERPL-MCNC: 9.8 MG/DL (ref 8.3–10.6)
CARBOXYHEMOGLOBIN: 1.9 % (ref 0–1.5)
CHLORIDE BLD-SCNC: 97 MMOL/L (ref 99–110)
CO2: 28 MMOL/L (ref 21–32)
CREAT SERPL-MCNC: 1 MG/DL (ref 0.8–1.3)
EKG ATRIAL RATE: 69 BPM
EKG DIAGNOSIS: NORMAL
EKG P AXIS: -11 DEGREES
EKG P-R INTERVAL: 118 MS
EKG Q-T INTERVAL: 370 MS
EKG QRS DURATION: 74 MS
EKG QTC CALCULATION (BAZETT): 396 MS
EKG R AXIS: 35 DEGREES
EKG T AXIS: 61 DEGREES
EKG VENTRICULAR RATE: 69 BPM
EOSINOPHILS ABSOLUTE: 0.2 K/UL (ref 0–0.6)
EOSINOPHILS RELATIVE PERCENT: 3.3 %
GFR AFRICAN AMERICAN: >60
GFR NON-AFRICAN AMERICAN: >60
GLUCOSE BLD-MCNC: 118 MG/DL (ref 70–99)
GLUCOSE BLD-MCNC: 138 MG/DL (ref 70–99)
GLUCOSE BLD-MCNC: 257 MG/DL (ref 70–99)
HCO3 VENOUS: 31.2 MMOL/L (ref 24–28)
HCT VFR BLD CALC: 38.7 % (ref 40.5–52.5)
HEMOGLOBIN, VEN, REDUCED: 30 %
HEMOGLOBIN: 11.2 G/DL (ref 13.5–17.5)
HEMOGLOBIN: 12.9 G/DL (ref 13.5–17.5)
INR BLD: 1.04 (ref 0.86–1.14)
LACTIC ACID: 0.8 MMOL/L (ref 0.4–2)
LACTIC ACID: 2.4 MMOL/L (ref 0.4–2)
LYMPHOCYTES ABSOLUTE: 1.1 K/UL (ref 1–5.1)
LYMPHOCYTES RELATIVE PERCENT: 15 %
MCH RBC QN AUTO: 29.5 PG (ref 26–34)
MCHC RBC AUTO-ENTMCNC: 33.4 G/DL (ref 31–36)
MCV RBC AUTO: 88.4 FL (ref 80–100)
METHEMOGLOBIN VENOUS: 0.5 % (ref 0–1.5)
MONOCYTES ABSOLUTE: 0.4 K/UL (ref 0–1.3)
MONOCYTES RELATIVE PERCENT: 5.9 %
NEUTROPHILS ABSOLUTE: 5.4 K/UL (ref 1.7–7.7)
NEUTROPHILS RELATIVE PERCENT: 74.6 %
O2 SAT, VEN: 69 %
PCO2, VEN: 59.1 MMHG (ref 41–51)
PDW BLD-RTO: 14.2 % (ref 12.4–15.4)
PERFORMED ON: ABNORMAL
PERFORMED ON: ABNORMAL
PH VENOUS: 7.34 (ref 7.35–7.45)
PLATELET # BLD: 240 K/UL (ref 135–450)
PMV BLD AUTO: 9 FL (ref 5–10.5)
PO2, VEN: 42.1 MMHG (ref 25–40)
POTASSIUM REFLEX MAGNESIUM: 4.4 MMOL/L (ref 3.5–5.1)
PROTHROMBIN TIME: 12.1 SEC (ref 10–13.2)
RBC # BLD: 4.38 M/UL (ref 4.2–5.9)
SODIUM BLD-SCNC: 137 MMOL/L (ref 136–145)
TCO2 CALC VENOUS: 33 MMOL/L
TOTAL PROTEIN: 7.8 G/DL (ref 6.4–8.2)
WBC # BLD: 7.3 K/UL (ref 4–11)

## 2020-04-24 PROCEDURE — 99284 EMERGENCY DEPT VISIT MOD MDM: CPT

## 2020-04-24 PROCEDURE — 6370000000 HC RX 637 (ALT 250 FOR IP): Performed by: INTERNAL MEDICINE

## 2020-04-24 PROCEDURE — 85018 HEMOGLOBIN: CPT

## 2020-04-24 PROCEDURE — 96374 THER/PROPH/DIAG INJ IV PUSH: CPT

## 2020-04-24 PROCEDURE — 2580000003 HC RX 258: Performed by: EMERGENCY MEDICINE

## 2020-04-24 PROCEDURE — 86901 BLOOD TYPING SEROLOGIC RH(D): CPT

## 2020-04-24 PROCEDURE — 6360000002 HC RX W HCPCS: Performed by: EMERGENCY MEDICINE

## 2020-04-24 PROCEDURE — G0378 HOSPITAL OBSERVATION PER HR: HCPCS

## 2020-04-24 PROCEDURE — 96376 TX/PRO/DX INJ SAME DRUG ADON: CPT

## 2020-04-24 PROCEDURE — 83605 ASSAY OF LACTIC ACID: CPT

## 2020-04-24 PROCEDURE — 82803 BLOOD GASES ANY COMBINATION: CPT

## 2020-04-24 PROCEDURE — 80076 HEPATIC FUNCTION PANEL: CPT

## 2020-04-24 PROCEDURE — 2580000003 HC RX 258: Performed by: INTERNAL MEDICINE

## 2020-04-24 PROCEDURE — 6360000002 HC RX W HCPCS: Performed by: INTERNAL MEDICINE

## 2020-04-24 PROCEDURE — 86850 RBC ANTIBODY SCREEN: CPT

## 2020-04-24 PROCEDURE — 86900 BLOOD TYPING SEROLOGIC ABO: CPT

## 2020-04-24 PROCEDURE — 85730 THROMBOPLASTIN TIME PARTIAL: CPT

## 2020-04-24 PROCEDURE — 80048 BASIC METABOLIC PNL TOTAL CA: CPT

## 2020-04-24 PROCEDURE — 93005 ELECTROCARDIOGRAM TRACING: CPT

## 2020-04-24 PROCEDURE — C9113 INJ PANTOPRAZOLE SODIUM, VIA: HCPCS | Performed by: INTERNAL MEDICINE

## 2020-04-24 PROCEDURE — 85610 PROTHROMBIN TIME: CPT

## 2020-04-24 PROCEDURE — 85025 COMPLETE CBC W/AUTO DIFF WBC: CPT

## 2020-04-24 PROCEDURE — 36415 COLL VENOUS BLD VENIPUNCTURE: CPT

## 2020-04-24 PROCEDURE — 1200000000 HC SEMI PRIVATE

## 2020-04-24 PROCEDURE — C9113 INJ PANTOPRAZOLE SODIUM, VIA: HCPCS | Performed by: EMERGENCY MEDICINE

## 2020-04-24 RX ORDER — NICOTINE POLACRILEX 4 MG
15 LOZENGE BUCCAL PRN
Status: DISCONTINUED | OUTPATIENT
Start: 2020-04-24 | End: 2020-04-26 | Stop reason: HOSPADM

## 2020-04-24 RX ORDER — SODIUM CHLORIDE 0.9 % (FLUSH) 0.9 %
10 SYRINGE (ML) INJECTION EVERY 12 HOURS SCHEDULED
Status: DISCONTINUED | OUTPATIENT
Start: 2020-04-24 | End: 2020-04-26 | Stop reason: HOSPADM

## 2020-04-24 RX ORDER — SODIUM CHLORIDE 0.9 % (FLUSH) 0.9 %
10 SYRINGE (ML) INJECTION PRN
Status: DISCONTINUED | OUTPATIENT
Start: 2020-04-24 | End: 2020-04-26 | Stop reason: HOSPADM

## 2020-04-24 RX ORDER — POLYETHYLENE GLYCOL 3350 17 G/17G
17 POWDER, FOR SOLUTION ORAL DAILY PRN
Status: DISCONTINUED | OUTPATIENT
Start: 2020-04-24 | End: 2020-04-26 | Stop reason: HOSPADM

## 2020-04-24 RX ORDER — PANTOPRAZOLE SODIUM 40 MG/10ML
40 INJECTION, POWDER, LYOPHILIZED, FOR SOLUTION INTRAVENOUS EVERY 12 HOURS
Status: DISCONTINUED | OUTPATIENT
Start: 2020-04-24 | End: 2020-04-25

## 2020-04-24 RX ORDER — TRAMADOL HYDROCHLORIDE 50 MG/1
50 TABLET ORAL EVERY 6 HOURS PRN
Status: DISCONTINUED | OUTPATIENT
Start: 2020-04-24 | End: 2020-04-26 | Stop reason: HOSPADM

## 2020-04-24 RX ORDER — PANTOPRAZOLE SODIUM 40 MG/10ML
40 INJECTION, POWDER, LYOPHILIZED, FOR SOLUTION INTRAVENOUS ONCE
Status: COMPLETED | OUTPATIENT
Start: 2020-04-24 | End: 2020-04-24

## 2020-04-24 RX ORDER — FINASTERIDE 5 MG/1
5 TABLET, FILM COATED ORAL NIGHTLY
COMMUNITY

## 2020-04-24 RX ORDER — EZETIMIBE 10 MG/1
10 TABLET ORAL DAILY
Status: DISCONTINUED | OUTPATIENT
Start: 2020-04-25 | End: 2020-04-26 | Stop reason: HOSPADM

## 2020-04-24 RX ORDER — FUROSEMIDE 20 MG/1
20 TABLET ORAL DAILY
Status: DISCONTINUED | OUTPATIENT
Start: 2020-04-25 | End: 2020-04-26 | Stop reason: HOSPADM

## 2020-04-24 RX ORDER — LEVOTHYROXINE SODIUM 112 UG/1
112 TABLET ORAL DAILY
Status: DISCONTINUED | OUTPATIENT
Start: 2020-04-24 | End: 2020-04-26 | Stop reason: HOSPADM

## 2020-04-24 RX ORDER — INSULIN LISPRO 100 [IU]/ML
0-6 INJECTION, SOLUTION INTRAVENOUS; SUBCUTANEOUS
Status: DISCONTINUED | OUTPATIENT
Start: 2020-04-24 | End: 2020-04-24

## 2020-04-24 RX ORDER — GABAPENTIN 100 MG/1
200 CAPSULE ORAL 3 TIMES DAILY
Status: DISCONTINUED | OUTPATIENT
Start: 2020-04-25 | End: 2020-04-26 | Stop reason: HOSPADM

## 2020-04-24 RX ORDER — ONDANSETRON 2 MG/ML
4 INJECTION INTRAMUSCULAR; INTRAVENOUS EVERY 6 HOURS PRN
Status: DISCONTINUED | OUTPATIENT
Start: 2020-04-24 | End: 2020-04-26 | Stop reason: HOSPADM

## 2020-04-24 RX ORDER — INSULIN LISPRO 100 [IU]/ML
0-3 INJECTION, SOLUTION INTRAVENOUS; SUBCUTANEOUS NIGHTLY
Status: DISCONTINUED | OUTPATIENT
Start: 2020-04-24 | End: 2020-04-24

## 2020-04-24 RX ORDER — PROMETHAZINE HYDROCHLORIDE 12.5 MG/1
12.5 TABLET ORAL EVERY 6 HOURS PRN
Status: DISCONTINUED | OUTPATIENT
Start: 2020-04-24 | End: 2020-04-26 | Stop reason: HOSPADM

## 2020-04-24 RX ORDER — LOSARTAN POTASSIUM AND HYDROCHLOROTHIAZIDE 12.5; 1 MG/1; MG/1
1 TABLET ORAL DAILY
Status: DISCONTINUED | OUTPATIENT
Start: 2020-04-25 | End: 2020-04-26 | Stop reason: HOSPADM

## 2020-04-24 RX ORDER — ISOSORBIDE MONONITRATE 30 MG/1
30 TABLET, EXTENDED RELEASE ORAL DAILY
COMMUNITY

## 2020-04-24 RX ORDER — SODIUM CHLORIDE, SODIUM LACTATE, POTASSIUM CHLORIDE, AND CALCIUM CHLORIDE .6; .31; .03; .02 G/100ML; G/100ML; G/100ML; G/100ML
1000 INJECTION, SOLUTION INTRAVENOUS ONCE
Status: COMPLETED | OUTPATIENT
Start: 2020-04-24 | End: 2020-04-24

## 2020-04-24 RX ORDER — GABAPENTIN 600 MG/1
600 TABLET ORAL 3 TIMES DAILY
Status: DISCONTINUED | OUTPATIENT
Start: 2020-04-24 | End: 2020-04-24

## 2020-04-24 RX ORDER — SODIUM CHLORIDE 9 MG/ML
10 INJECTION INTRAVENOUS EVERY 12 HOURS
Status: DISCONTINUED | OUTPATIENT
Start: 2020-04-24 | End: 2020-04-26 | Stop reason: HOSPADM

## 2020-04-24 RX ORDER — DEXTROSE MONOHYDRATE 25 G/50ML
12.5 INJECTION, SOLUTION INTRAVENOUS PRN
Status: DISCONTINUED | OUTPATIENT
Start: 2020-04-24 | End: 2020-04-26 | Stop reason: HOSPADM

## 2020-04-24 RX ORDER — DEXTROSE MONOHYDRATE 50 MG/ML
100 INJECTION, SOLUTION INTRAVENOUS PRN
Status: DISCONTINUED | OUTPATIENT
Start: 2020-04-24 | End: 2020-04-26 | Stop reason: HOSPADM

## 2020-04-24 RX ADMIN — SODIUM CHLORIDE, POTASSIUM CHLORIDE, SODIUM LACTATE AND CALCIUM CHLORIDE 1000 ML: 600; 310; 30; 20 INJECTION, SOLUTION INTRAVENOUS at 15:45

## 2020-04-24 RX ADMIN — Medication 10 ML: at 18:23

## 2020-04-24 RX ADMIN — Medication 10 ML: at 20:04

## 2020-04-24 RX ADMIN — PANTOPRAZOLE SODIUM 40 MG: 40 INJECTION, POWDER, FOR SOLUTION INTRAVENOUS at 18:23

## 2020-04-24 RX ADMIN — PANTOPRAZOLE SODIUM 40 MG: 40 INJECTION, POWDER, FOR SOLUTION INTRAVENOUS at 14:29

## 2020-04-24 RX ADMIN — GABAPENTIN 600 MG: 600 TABLET, FILM COATED ORAL at 20:03

## 2020-04-24 RX ADMIN — TRAMADOL HYDROCHLORIDE 50 MG: 50 TABLET, FILM COATED ORAL at 20:07

## 2020-04-24 RX ADMIN — LEVOTHYROXINE SODIUM 112 MCG: 112 TABLET ORAL at 20:03

## 2020-04-24 ASSESSMENT — PAIN SCALES - GENERAL
PAINLEVEL_OUTOF10: 4
PAINLEVEL_OUTOF10: 0
PAINLEVEL_OUTOF10: 0
PAINLEVEL_OUTOF10: 2

## 2020-04-24 NOTE — H&P
18   CREATININE 1.0   CALCIUM 9.8     Recent Labs     04/24/20  1442   AST 19   ALT 15   BILIDIR <0.2   BILITOT 0.8   ALKPHOS 67     Recent Labs     04/24/20  1442   INR 1.04     No results for input(s): Cherri Boxer in the last 72 hours. Urinalysis:    No results found for: NITRU, [address], RBCUA, BLOODU, SPECGRAV, GLUCOSEU    EKG:   Pending    ASSESSMENT:    Active Hospital Problems    Diagnosis Date Noted    GIB (gastrointestinal bleeding) [K92.2] 04/24/2020     PLAN:    Acute blood loss anemia secondary to GI bleed:  Monitor hemoglobin  Type and cross  Transfuse if hemoglobin less than 8 g/dL    GI bleed:  Likely secondary to diverticulosis  Has had similar episodes in the past (2015, 2018) where GI bleed was secondary to diverticulosis  Continue Protonix   N.p.o. effective now in anticipation of possible EGD/colonoscopy  GI consulted    Lactic acidosis:  Likely secondary to above  Status post 1 L of Ringer lactate  Monitor lactic acid    Insulin dependent diabetes mellitus:  Patient uses insulin pump and wants to manage his blood glucose levels that way  Discussed with patient regarding monitoring of blood glucose level as he is going to be n.p.o. for possible procedure  Monitor blood glucose level   Will hold off on correction scale as patient is n.p.o. and at risk of hypoglycemia  Hypoglycemia protocol    Hypertension:  Monitor blood pressure  Continue home medication with hold parameters    Hyperlipidemia:  Continue Zetia    Hypothyroidism:  Continue Synthroid    Neuropathy:  Continue gabapentin and tramadol as needed      DVT Prophylaxis: SCDs  Diet: N.p.o. effective now   code Status: Full code    PT/OT Eval Status: At his baseline    Dispo -anticipate discharge in 48 to 72 hours       Raenette Cheadle, MD    Thank you Nichole Porras MD for the opportunity to be involved in this patient's care. If you have any questions or concerns please feel free to contact me at [phone].

## 2020-04-24 NOTE — PROGRESS NOTES
4 Eyes Admission Assessment     I agree as the admission nurse that 2 RN's have performed a thorough Head to Toe Skin Assessment on the patient. ALL assessment sites listed below have been assessed on admission. Areas assessed by both nurses:   [x]   Head, Face, and Ears   [x]   Shoulders, Back, and Chest  [x]   Arms, Elbows, and Hands   [x]   Coccyx, Sacrum, and Ischum  [x]   Legs, Feet, and Heels        Does the Patient have Skin Breakdown?   No         Master Prevention initiated:  No   Wound Care Orders initiated:  No      [facility] nurse consulted for Pressure Injury (Stage 3,4, Unstageable, DTI, NWPT, and Complex wounds):  No      Nurse 1 eSignature: Electronically signed by Grayson Purdy RN on 4/24/20 at 5:55 PM EDT    **SHARE this note so that the co-signing nurse is able to place an eSignature**    Nurse 2 eSignature: Electronically signed by Sergio Thompson RN on 4/24/20 at 5:56 PM EDT

## 2020-04-24 NOTE — PROGRESS NOTES
Pt is alert and oriented x4. Up x1 with no walker. Tolerating ice chips well. Denies nausea/vomiting/pain.

## 2020-04-24 NOTE — PROGRESS NOTES
GI:    Asked by Er to see Mr Angeline Braga. He is a patient of Dr Karolyn Montilla. Spoke with Dr Ny Burger.  She will consult him    antonia Mann.  4-24-20

## 2020-04-24 NOTE — ED PROVIDER NOTES
recommendations beyond what was already performed. Patient was endorsed to hospitalist Dr. Too Ruelas who agreed to admit the patient. Clinical Impression     1. Lower GI bleed        Disposition     PATIENT REFERRED TO:  No follow-up provider specified. DISCHARGE MEDICATIONS:  New Prescriptions    No medications on file     DISPOSITION Admitted 04/24/2020 04:19:01 PM    This chart was generated in part by using Dragon Dictation system and may contain errors related to that system including errors in grammar, punctuation, and spelling, as well as words and phrases that may be inappropriate. When dictating, effort is made to correct spelling/grammar errors. If there are any questions or concerns please feel free to contact the dictating provider for clarification.          Corrina Amaya MD  04/26/20 [address], MD  04/26/20 8250

## 2020-04-25 LAB
ANION GAP SERPL CALCULATED.3IONS-SCNC: 11 MMOL/L (ref 3–16)
BUN BLDV-MCNC: 16 MG/DL (ref 7–20)
CALCIUM SERPL-MCNC: 9.4 MG/DL (ref 8.3–10.6)
CHLORIDE BLD-SCNC: 100 MMOL/L (ref 99–110)
CO2: 30 MMOL/L (ref 21–32)
CREAT SERPL-MCNC: 1 MG/DL (ref 0.8–1.3)
GFR AFRICAN AMERICAN: >60
GFR NON-AFRICAN AMERICAN: >60
GLUCOSE BLD-MCNC: 113 MG/DL (ref 70–99)
GLUCOSE BLD-MCNC: 117 MG/DL (ref 70–99)
GLUCOSE BLD-MCNC: 142 MG/DL (ref 70–99)
GLUCOSE BLD-MCNC: 91 MG/DL (ref 70–99)
HCT VFR BLD CALC: 34.3 % (ref 40.5–52.5)
HEMOGLOBIN: 11.5 G/DL (ref 13.5–17.5)
HEMOGLOBIN: 12.3 G/DL (ref 13.5–17.5)
MCH RBC QN AUTO: 29.4 PG (ref 26–34)
MCHC RBC AUTO-ENTMCNC: 33.4 G/DL (ref 31–36)
MCV RBC AUTO: 87.9 FL (ref 80–100)
PDW BLD-RTO: 13.9 % (ref 12.4–15.4)
PERFORMED ON: ABNORMAL
PERFORMED ON: ABNORMAL
PERFORMED ON: NORMAL
PLATELET # BLD: 204 K/UL (ref 135–450)
PMV BLD AUTO: 8.4 FL (ref 5–10.5)
POTASSIUM REFLEX MAGNESIUM: 3.8 MMOL/L (ref 3.5–5.1)
RBC # BLD: 3.9 M/UL (ref 4.2–5.9)
SODIUM BLD-SCNC: 141 MMOL/L (ref 136–145)
WBC # BLD: 6.5 K/UL (ref 4–11)

## 2020-04-25 PROCEDURE — 6370000000 HC RX 637 (ALT 250 FOR IP): Performed by: INTERNAL MEDICINE

## 2020-04-25 PROCEDURE — 1200000000 HC SEMI PRIVATE

## 2020-04-25 PROCEDURE — 36415 COLL VENOUS BLD VENIPUNCTURE: CPT

## 2020-04-25 PROCEDURE — 85018 HEMOGLOBIN: CPT

## 2020-04-25 PROCEDURE — G0378 HOSPITAL OBSERVATION PER HR: HCPCS

## 2020-04-25 PROCEDURE — 80048 BASIC METABOLIC PNL TOTAL CA: CPT

## 2020-04-25 PROCEDURE — C9113 INJ PANTOPRAZOLE SODIUM, VIA: HCPCS | Performed by: INTERNAL MEDICINE

## 2020-04-25 PROCEDURE — 96376 TX/PRO/DX INJ SAME DRUG ADON: CPT

## 2020-04-25 PROCEDURE — 2580000003 HC RX 258: Performed by: INTERNAL MEDICINE

## 2020-04-25 PROCEDURE — 85027 COMPLETE CBC AUTOMATED: CPT

## 2020-04-25 PROCEDURE — 6360000002 HC RX W HCPCS: Performed by: INTERNAL MEDICINE

## 2020-04-25 RX ORDER — INSULIN LISPRO 100 [IU]/ML
0-6 INJECTION, SOLUTION INTRAVENOUS; SUBCUTANEOUS
Status: DISCONTINUED | OUTPATIENT
Start: 2020-04-25 | End: 2020-04-26 | Stop reason: HOSPADM

## 2020-04-25 RX ORDER — PANTOPRAZOLE SODIUM 40 MG/1
40 TABLET, DELAYED RELEASE ORAL
Status: DISCONTINUED | OUTPATIENT
Start: 2020-04-26 | End: 2020-04-26 | Stop reason: HOSPADM

## 2020-04-25 RX ORDER — INSULIN LISPRO 100 [IU]/ML
0-3 INJECTION, SOLUTION INTRAVENOUS; SUBCUTANEOUS NIGHTLY
Status: DISCONTINUED | OUTPATIENT
Start: 2020-04-25 | End: 2020-04-26 | Stop reason: HOSPADM

## 2020-04-25 RX ADMIN — FUROSEMIDE 20 MG: 20 TABLET ORAL at 08:35

## 2020-04-25 RX ADMIN — Medication 10 ML: at 08:36

## 2020-04-25 RX ADMIN — EZETIMIBE 10 MG: 10 TABLET ORAL at 08:35

## 2020-04-25 RX ADMIN — Medication 10 ML: at 05:55

## 2020-04-25 RX ADMIN — Medication 10 ML: at 20:27

## 2020-04-25 RX ADMIN — LEVOTHYROXINE SODIUM 112 MCG: 112 TABLET ORAL at 06:30

## 2020-04-25 RX ADMIN — Medication 10 ML: at 08:41

## 2020-04-25 RX ADMIN — GABAPENTIN 200 MG: 100 CAPSULE ORAL at 20:21

## 2020-04-25 RX ADMIN — LOSARTAN POTASSIUM AND HYDROCHLOROTHIAZIDE 1 TABLET: 12.5; 1 TABLET ORAL at 08:35

## 2020-04-25 RX ADMIN — PANTOPRAZOLE SODIUM 40 MG: 40 INJECTION, POWDER, FOR SOLUTION INTRAVENOUS at 05:54

## 2020-04-25 RX ADMIN — GABAPENTIN 200 MG: 100 CAPSULE ORAL at 13:49

## 2020-04-25 RX ADMIN — GABAPENTIN 200 MG: 100 CAPSULE ORAL at 08:35

## 2020-04-25 RX ADMIN — TRAMADOL HYDROCHLORIDE 50 MG: 50 TABLET, FILM COATED ORAL at 08:41

## 2020-04-25 ASSESSMENT — PAIN DESCRIPTION - LOCATION: LOCATION: FOOT

## 2020-04-25 ASSESSMENT — PAIN SCALES - GENERAL
PAINLEVEL_OUTOF10: 0
PAINLEVEL_OUTOF10: 4
PAINLEVEL_OUTOF10: 4
PAINLEVEL_OUTOF10: 0
PAINLEVEL_OUTOF10: 4

## 2020-04-25 ASSESSMENT — PAIN DESCRIPTION - ORIENTATION: ORIENTATION: RIGHT;LEFT

## 2020-04-25 ASSESSMENT — PAIN DESCRIPTION - ONSET: ONSET: ON-GOING

## 2020-04-25 ASSESSMENT — PAIN DESCRIPTION - FREQUENCY: FREQUENCY: CONTINUOUS

## 2020-04-25 ASSESSMENT — PAIN DESCRIPTION - DESCRIPTORS: DESCRIPTORS: ACHING

## 2020-04-25 ASSESSMENT — PAIN DESCRIPTION - PROGRESSION: CLINICAL_PROGRESSION: NOT CHANGED

## 2020-04-25 ASSESSMENT — PAIN - FUNCTIONAL ASSESSMENT: PAIN_FUNCTIONAL_ASSESSMENT: ACTIVITIES ARE NOT PREVENTED

## 2020-04-25 ASSESSMENT — PAIN DESCRIPTION - PAIN TYPE: TYPE: NEUROPATHIC PAIN

## 2020-04-25 NOTE — PLAN OF CARE
Problem: Falls - Risk of:  Goal: Will remain free from falls  Description: Will remain free from falls  4/25/2020 0406 by Erin Cheung RN  Outcome: Met This Shift   Pt free from injury this shift and free of falls. 2/4 rails up on bed and bed is in the lowest position. Wheels locked and bed alarm set to personal. Pt UAL. Socks on pt and ID bands on pt. Call light in reach of pt. Will continue to monitor for safety. Problem: Bowel Function - Altered:  Goal: Bowel elimination is within specified parameters  Description: Bowel elimination is within specified parameters  4/25/2020 0406 by Erin Cheung RN  Outcome: Ongoing   Pt had a bloody smear prior to shift change and then had no BM overnight. Cdiff rule out is b/c of report of loose stools. Will collect if adequate amount of stool is available and within the parameters to collect.

## 2020-04-25 NOTE — PROGRESS NOTES
Nery Velazquez  Full note dictated    IMP:  Acute onset of BRBPR yesterday - 2 episodes  One minor episode this AM  No abdominal pain, change in bowel habits  Colonoscopy in 05/19 - hemorrhoids, diverticulosis, polyps  Here, VSS  Abdomen soft, nontender  Hgb initially 12.9; now stable at 11.5    The etiology would appear to be hemorrhoidal vs limited diverticular bleeding      REC:  Advance diet  H/H q 12h  If no further significant bleeding through today,can discharge tomorrow with outpatient f/u with Dr. Avril Velazquez

## 2020-04-25 NOTE — PROGRESS NOTES
Hospitalist Progress Note      PCP: Christianne Vigil MD    Date of Admission: 4/24/2020    Chief Complaint: GIB    Hospital Course: Admitted for GI bleed likely secondary to diverticulosis. Hemoglobin stable. GI following    Subjective: Patient states that he is doing well. Denies any further bleeding. Denies nausea vomiting or abdominal pain. Medications:  Reviewed    Infusion Medications    dextrose      Insulin Pump - insulin regular U-500 (CONC)       Scheduled Medications    ezetimibe  10 mg Oral Daily    furosemide  20 mg Oral Daily    levothyroxine  112 mcg Oral Daily    losartan-hydrochlorothiazide  1 tablet Oral Daily    sodium chloride flush  10 mL Intravenous 2 times per day    sodium chloride flush  10 mL Intravenous 2 times per day    pantoprazole  40 mg Intravenous Q12H    And    sodium chloride (PF)  10 mL Intravenous Q12H    gabapentin  200 mg Oral TID     PRN Meds: traMADol, sodium chloride flush, sodium chloride flush, polyethylene glycol, promethazine **OR** ondansetron, glucose, dextrose, glucagon (rDNA), dextrose      Intake/Output Summary (Last 24 hours) at 4/25/2020 1017  Last data filed at 4/25/2020 0949  Gross per 24 hour   Intake 600 ml   Output --   Net 600 ml       Physical Exam Performed:    BP (!) 141/56   Pulse 57   Temp 97.8 °F (36.6 °C) (Oral)   Resp 16   Ht 5' 8\" (1.727 m)   Wt 202 lb 3.2 oz (91.7 kg)   SpO2 95%   BMI 30.74 kg/m²     General appearance:  No apparent distress, appears stated age and cooperative. HEENT:  Normal cephalic, atraumatic without obvious deformity. Pupils equal, round, and reactive to light. Extra ocular muscles intact. Conjunctivae/corneas clear. Neck: Supple, with full range of motion. No jugular venous distention. Trachea midline. Respiratory:  Normal respiratory effort. Clear to auscultation, bilaterally without Rales/Wheezes/Rhonchi.   Cardiovascular:  Regular rate and rhythm with normal S1/S2 without murmurs, rubs or

## 2020-04-25 NOTE — PROGRESS NOTES
Pt is alert and oriented. bp slightly elevated, scheduled bp meds given. C/o of pain, medication given. Pt has own insulin pump. Ok'd to use. UAL with steady gait. Tolerating diet. Voiding without issue. Stool is still softly formed, with some brown/black/red in color. Will continue to monitor.

## 2020-04-26 VITALS
SYSTOLIC BLOOD PRESSURE: 149 MMHG | RESPIRATION RATE: 16 BRPM | WEIGHT: 202.2 LBS | DIASTOLIC BLOOD PRESSURE: 71 MMHG | OXYGEN SATURATION: 99 % | HEART RATE: 62 BPM | HEIGHT: 68 IN | TEMPERATURE: 97.8 F | BODY MASS INDEX: 30.65 KG/M2

## 2020-04-26 LAB
ANION GAP SERPL CALCULATED.3IONS-SCNC: 12 MMOL/L (ref 3–16)
BUN BLDV-MCNC: 16 MG/DL (ref 7–20)
CALCIUM SERPL-MCNC: 9.3 MG/DL (ref 8.3–10.6)
CHLORIDE BLD-SCNC: 97 MMOL/L (ref 99–110)
CO2: 30 MMOL/L (ref 21–32)
CREAT SERPL-MCNC: 1.1 MG/DL (ref 0.8–1.3)
GFR AFRICAN AMERICAN: >60
GFR NON-AFRICAN AMERICAN: >60
GLUCOSE BLD-MCNC: 153 MG/DL (ref 70–99)
GLUCOSE BLD-MCNC: 181 MG/DL (ref 70–99)
HCT VFR BLD CALC: 35.8 % (ref 40.5–52.5)
HEMOGLOBIN: 12.2 G/DL (ref 13.5–17.5)
HEMOGLOBIN: 12.2 G/DL (ref 13.5–17.5)
MCH RBC QN AUTO: 29.8 PG (ref 26–34)
MCHC RBC AUTO-ENTMCNC: 34.1 G/DL (ref 31–36)
MCV RBC AUTO: 87.3 FL (ref 80–100)
PDW BLD-RTO: 14.4 % (ref 12.4–15.4)
PERFORMED ON: ABNORMAL
PLATELET # BLD: 215 K/UL (ref 135–450)
PMV BLD AUTO: 8.6 FL (ref 5–10.5)
POTASSIUM REFLEX MAGNESIUM: 4 MMOL/L (ref 3.5–5.1)
RBC # BLD: 4.1 M/UL (ref 4.2–5.9)
SODIUM BLD-SCNC: 139 MMOL/L (ref 136–145)
WBC # BLD: 7.4 K/UL (ref 4–11)

## 2020-04-26 PROCEDURE — 6370000000 HC RX 637 (ALT 250 FOR IP): Performed by: INTERNAL MEDICINE

## 2020-04-26 PROCEDURE — G0378 HOSPITAL OBSERVATION PER HR: HCPCS

## 2020-04-26 PROCEDURE — 36415 COLL VENOUS BLD VENIPUNCTURE: CPT

## 2020-04-26 PROCEDURE — 2580000003 HC RX 258: Performed by: INTERNAL MEDICINE

## 2020-04-26 PROCEDURE — 80048 BASIC METABOLIC PNL TOTAL CA: CPT

## 2020-04-26 PROCEDURE — 85027 COMPLETE CBC AUTOMATED: CPT

## 2020-04-26 PROCEDURE — 85018 HEMOGLOBIN: CPT

## 2020-04-26 RX ADMIN — FUROSEMIDE 20 MG: 20 TABLET ORAL at 08:31

## 2020-04-26 RX ADMIN — PANTOPRAZOLE SODIUM 40 MG: 40 TABLET, DELAYED RELEASE ORAL at 05:44

## 2020-04-26 RX ADMIN — GABAPENTIN 200 MG: 100 CAPSULE ORAL at 08:30

## 2020-04-26 RX ADMIN — EZETIMIBE 10 MG: 10 TABLET ORAL at 08:31

## 2020-04-26 RX ADMIN — LEVOTHYROXINE SODIUM 112 MCG: 112 TABLET ORAL at 05:44

## 2020-04-26 RX ADMIN — Medication 10 ML: at 08:33

## 2020-04-26 RX ADMIN — Medication 10 ML: at 08:31

## 2020-04-26 RX ADMIN — LOSARTAN POTASSIUM AND HYDROCHLOROTHIAZIDE 1 TABLET: 12.5; 1 TABLET ORAL at 08:31

## 2020-04-26 ASSESSMENT — PAIN SCALES - GENERAL: PAINLEVEL_OUTOF10: 0

## 2020-04-26 NOTE — DISCHARGE SUMMARY
Asprin on 8/26 (1 week after discharge)  Qty: 30 tablet, Refills: 3      gabapentin (NEURONTIN) 600 MG tablet Take 200 mg by mouth 3 times daily. Pt states he takes 200 mg TID      levothyroxine (SYNTHROID) 112 MCG tablet Take 112 mcg by mouth Daily      insulin regular human (HUMULIN R U-500) 500 UNIT/ML concentrated injection vial Inject into the skin Use with insulin pump      ezetimibe (ZETIA) 10 MG tablet Take 10 mg by mouth daily      losartan-hydrochlorothiazide (HYZAAR) 100-12.5 MG per tablet Take 1 tablet by mouth daily      INSULIN INFUSION PUMP by Does not apply route Uses U-500 insulin      furosemide (LASIX) 20 MG tablet Take 20 mg by mouth daily. tramadol (ULTRAM) 50 MG tablet Take 50 mg by mouth every 6 hours as needed. acetaminophen (TYLENOL) 500 MG tablet Take 500 mg by mouth every 6 hours as needed             Time Spent on discharge is more than 30 minutes in the examination, evaluation, counseling and review of medications and discharge plan. Signed:    Arabella Abbasi MD   4/26/2020      Thank you Nikhil Verdin MD for the opportunity to be involved in this patient's care. If you have any questions or concerns please feel free to contact me at [phone].

## 2020-04-26 NOTE — DISCHARGE INSTR - COC
Continuity of Care Form    Patient Name: Singh Tee   :  1940  MRN:  [id number]    Admit date:  2020  Discharge date:  ***    Code Status Order: Full Code   Advance Directives:   Advance Care Flowsheet Documentation     Date/Time Healthcare Directive Type of Healthcare Directive Copy in [address] Agent's Name Healthcare Agent's Phone Number    [phone]  Yes, patient has an advance directive for healthcare treatment  Durable power of  for health care;Living will  No, copy requested from family  Healthcare power of   Bright Harden wife  in chart          Admitting Physician:  Christie Ramírez MD  PCP: Anabelle Hancock MD    Discharging Nurse: [facility] Unit/Room#: 2171/2372-78  Discharging Unit Phone Number: ***    Emergency Contact:   Extended Emergency Contact Information  Primary Emergency Contact: Maira Bells  Address: [address] [place] [address] [address] Phone: [phone]  Mobile Phone: [phone]  Relation: Spouse  Secondary Emergency Contact: [address] [place] [address] [place] Phone: [phone]  Mobile Phone: [phone]  Relation: Child    Past Surgical History:  Past Surgical History:   Procedure Laterality Date    ABDOMEN SURGERY      Bowel surgery where a portion of his bowel was removed    APPENDECTOMY      BACK SURGERY      twice    CARDIAC SURGERY      bypass x4    CATARACT REMOVAL WITH IMPLANT      right eye    CHOLECYSTECTOMY, LAPAROSCOPIC  2013    w/cholangiogram    COLONOSCOPY N/A 2019    COLONOSCOPY WITH BIOPSY performed by Miguel Angel Ramirez MD at [address]  5/10/11    Left ulnar nerve decompression    EYE SURGERY Bilateral 11    Left Eye Cataract Removal    JOINT REPLACEMENT Left toe metatarsal    SHOULDER ARTHROPLASTY Right     SPINE SURGERY  2019    Spinal cord stimulator    TOTAL KNEE ARTHROPLASTY Bilateral     done    Treatments at the Time of Hospital Discharge:   Respiratory Treatments: ***  Oxygen Therapy:  is not on home oxygen therapy. Ventilator:    - No ventilator support    Rehab Therapies: {THERAPEUTIC INTERVENTION:0625007394}  Weight Bearing Status/Restrictions: No weight bearing restirctions  Other Medical Equipment (for information only, NOT a DME order):  {EQUIPMENT:642366205}  Other Treatments: ***    Patient's personal belongings (please select all that are sent with patient):  {[facility] DME Belongings:646315950}    RN SIGNATURE:  Electronically signed by Dax Saleh RN on 4/26/20 at 10:55 AM EDT    CASE MANAGEMENT/SOCIAL WORK SECTION    Inpatient Status Date: ***    Readmission Risk Assessment Score:  Readmission Risk              Risk of Unplanned Readmission:        10           Discharging to Facility/ Agency   · Name:   · Address:  · Phone:  · Fax:    Dialysis Facility (if applicable)   · Name:  · Address:  · Dialysis Schedule:  · Phone:  · Fax:    / signature: {Esignature:132143353}    PHYSICIAN SECTION    Prognosis: {Prognosis:2479351626}    Condition at Discharge: [facility] Patient Condition:240426696}    Rehab Potential (if transferring to Rehab): {Prognosis:9204459147}    Recommended Labs or Other Treatments After Discharge: ***    Physician Certification: I certify the above information and transfer of Rosa Mustafa  is necessary for the continuing treatment of the diagnosis listed and that he requires {Admit to Appropriate Level of Care:70730} for {GREATER/LESS:069128105} 30 days.      Update Admission H&P: {P DME Changes in YPUYZ:644406843}    PHYSICIAN SIGNATURE:  {Esignature:202585187}

## 2020-04-26 NOTE — PROGRESS NOTES
Pt is alert and oriented times 4. VSS. Pt had one bowel movement this shift with no evidence of blood in the stool. Abdomen is soft and non tender. Pt tolerating ambulation well independently. All fall precautions in place. Will continue to monitor.

## 2020-04-26 NOTE — CONSULTS
[address]                [address]                                  CONSULTATION    PATIENT NAME: Rosy Lundberg                      :        1940  MED REC NO:   [id number]                          ROOM:       [id number]  ACCOUNT NO:   [de-identified]                           ADMIT DATE: 2020  PROVIDER:     [facility] JUAN MANUEL Cordoba MD    CONSULT DATE:  2020    REFERRING PHYSICIANS:  Dipesh Lenz MD, and Bucky Barnett MD    HISTORY OF PRESENT ILLNESS:  The patient is an 79-year-old white male  admitted through the emergency room last night with two episodes of  bright red blood per rectum earlier in the day. He denied any abdominal  pain or change in bowel habits. The patient did have a similar episode  last year and Dr. Jenny Hurtado had performed a colonoscopy in 2019. This revealed hemorrhoids, diverticulosis, and several benign polyps  that were removed. The patient had one minor episode of bleeding this  morning. Again, he denied any abdominal pain. His hemoglobin on  admission was 12.9. It has stabilized at 11.5. PAST MEDICAL HISTORY:  This is remarkable for coronary artery disease,  status post CABG, hypertension, diabetes mellitus, hyperlipidemia,  neuropathy, hypothyroidism, and arthritis. He also has sleep apnea. PAST SURGICAL HISTORY:  Previous abdominal surgeries have included a  cholecystectomy and a bowel resection which was apparently the small  bowel. This was not specified further. HOME MEDICATIONS:  Have included an insulin pump, an 81 mg aspirin,  Neurontin, Synthroid, losartan/HCTZ, Lasix, Ultram, and subcutaneous  insulin. ALLERGIES:  ANCEF, SULFA, POLYSPORIN and NEOSPORIN, and BACTROBAN were  listed. FAMILY HISTORY:  Noncontributory. SOCIAL HISTORY:  The patient is a nonsmoker and nondrinker.     PHYSICAL EXAMINATION:  VITAL SIGNS:  Blood pressure elevated at 165/78, pulse 64, temperature  98.3 degrees orally. GENERAL:  The patient appeared as an alert and cooperative elderly white  male in no acute distress. ABDOMEN:  Obese but soft and nontender. The spleen was not palpated. I  could palpate the inferior edge of the liver with deep inspiration. RECTAL:  I did not repeat a rectal examination. IMPRESSION:  Lower GI bleeding. The patient had two episodes of bright  red blood per rectum yesterday and a minor episode this morning. He has  remained hemodynamically stable and has a benign abdominal exam.  His  hemoglobin has stabilized at 11.5. Previous colonoscopy last year  revealed hemorrhoids, diverticulosis, and polyps. The etiology would  appear to be either hemorrhoidal or limited diverticular bleeding. PLAN:  The patient's diet will be advanced. I will change his H&H  checks to every 12 hours. If there is no further significant bleeding  through today, the patient can be discharged tomorrow with outpatient  followup with Dr. Shruthi Foss. Given the very recent colonoscopy, this  will most likely not need to be repeated. DENILSON Rose MD    D: 04/25/2020 11:10:12       T: 04/25/2020 15:52:02     [id number]  Job#: [id number]     Doc#: [id number]    CC:  Meli Cartagena MD       [facility].  [address]

## 2020-05-14 ENCOUNTER — OFFICE VISIT (OUTPATIENT)
Dept: PRIMARY CARE CLINIC | Age: 80
End: 2020-05-14

## 2020-05-15 LAB
SARS-COV-2: NOT DETECTED
SOURCE: NORMAL

## 2020-05-18 ENCOUNTER — HOSPITAL ENCOUNTER (OUTPATIENT)
Age: 80
Setting detail: OUTPATIENT SURGERY
Discharge: HOME | End: 2020-05-18
Attending: INTERNAL MEDICINE | Admitting: INTERNAL MEDICINE
Payer: MEDICARE

## 2020-05-18 VITALS
TEMPERATURE: 96.6 F | DIASTOLIC BLOOD PRESSURE: 55 MMHG | WEIGHT: 202 LBS | RESPIRATION RATE: 18 BRPM | BODY MASS INDEX: 30.62 KG/M2 | HEART RATE: 54 BPM | HEIGHT: 68 IN | SYSTOLIC BLOOD PRESSURE: 117 MMHG | OXYGEN SATURATION: 95 %

## 2020-05-18 LAB
GLUCOSE BLD-MCNC: 229 MG/DL (ref 70–99)
PERFORMED ON: ABNORMAL

## 2020-05-18 PROCEDURE — 99152 MOD SED SAME PHYS/QHP 5/>YRS: CPT | Performed by: INTERNAL MEDICINE

## 2020-05-18 PROCEDURE — 3609010300 HC COLONOSCOPY W/BIOPSY SINGLE/MULTIPLE: Performed by: INTERNAL MEDICINE

## 2020-05-18 PROCEDURE — 99153 MOD SED SAME PHYS/QHP EA: CPT | Performed by: INTERNAL MEDICINE

## 2020-05-18 PROCEDURE — 7100000011 HC PHASE II RECOVERY - ADDTL 15 MIN: Performed by: INTERNAL MEDICINE

## 2020-05-18 PROCEDURE — 7100000010 HC PHASE II RECOVERY - FIRST 15 MIN: Performed by: INTERNAL MEDICINE

## 2020-05-18 PROCEDURE — 2709999900 HC NON-CHARGEABLE SUPPLY: Performed by: INTERNAL MEDICINE

## 2020-05-18 PROCEDURE — 6360000002 HC RX W HCPCS: Performed by: INTERNAL MEDICINE

## 2020-05-18 PROCEDURE — 88305 TISSUE EXAM BY PATHOLOGIST: CPT

## 2020-05-18 RX ORDER — MIDAZOLAM HYDROCHLORIDE 1 MG/ML
INJECTION INTRAMUSCULAR; INTRAVENOUS PRN
Status: DISCONTINUED | OUTPATIENT
Start: 2020-05-18 | End: 2020-05-18 | Stop reason: ALTCHOICE

## 2020-05-18 RX ORDER — FENTANYL CITRATE 50 UG/ML
INJECTION, SOLUTION INTRAMUSCULAR; INTRAVENOUS PRN
Status: DISCONTINUED | OUTPATIENT
Start: 2020-05-18 | End: 2020-05-18 | Stop reason: ALTCHOICE

## 2020-05-18 ASSESSMENT — PAIN SCALES - WONG BAKER
WONGBAKER_NUMERICALRESPONSE: 0
WONGBAKER_NUMERICALRESPONSE: 2
WONGBAKER_NUMERICALRESPONSE: 0

## 2020-05-18 NOTE — H&P
ULTRAFINE III SHORT PEN 31G X 8 MM MISC  2/20/19   Historical Provider, MD   acetaminophen (TYLENOL) 500 MG tablet Take 500 mg by mouth every 6 hours as needed 6/7/17   Historical Provider, MD       Allergies: Allergies   Allergen Reactions    Bactrim Anaphylaxis    Mupirocin Swelling and Other (See Comments)     Per H&P, caused him not to be able to see  Redness      Neomycin-Bacitracin Zn-Polymyx      Other reaction(s): Vision Problem  THE DROPS    Polysporin [Bacitracin-Polymyxin B] Other (See Comments)     Causes loss vision  When used in eyes redness    Silver     Sulfa Antibiotics Dermatitis    Tape [Adhesive Tape]      Red bumps can use paper tape     Wound Dressings Dermatitis     Skin peeled off with dressing.     Ancef [Cefazolin Sodium] Rash       PSHx:    Past Surgical History:   Procedure Laterality Date    ABDOMEN SURGERY      Bowel surgery where a portion of his bowel was removed    APPENDECTOMY      BACK SURGERY      twice    CARDIAC SURGERY      bypass x4    CATARACT REMOVAL WITH IMPLANT  2011    right eye    CHOLECYSTECTOMY, LAPAROSCOPIC  12/5/2013    w/cholangiogram    COLONOSCOPY N/A 5/6/2019    COLONOSCOPY WITH BIOPSY performed by Tiffany Cano MD at [address]  5/10/11    Left ulnar nerve decompression    EYE SURGERY Bilateral 12/20/11    Left Eye Cataract Removal    JOINT REPLACEMENT Left toe metatarsal    SHOULDER ARTHROPLASTY Right     SPINE SURGERY  01/2019    Spinal cord stimulator    TOTAL KNEE ARTHROPLASTY Bilateral     UVULOPALATOPHARYGOPLASTY         Social Hx:    Social History     Socioeconomic History    Marital status:      Spouse name: Not on file    Number of children: 2    Years of education: Not on file    Highest education level: Not on file   Occupational History    Occupation: retired   Social Needs    Financial resource strain: Not on file    Food insecurity     Worry: Not on file     Inability: Not on file  Transportation needs     Medical: Not on file     Non-medical: Not on file   Tobacco Use    Smoking status: Never Smoker    Smokeless tobacco: Never Used   Substance and Sexual Activity    Alcohol use: No    Drug use: No    Sexual activity: Not Currently   Lifestyle    Physical activity     Days per week: Not on file     Minutes per session: Not on file    Stress: Not on file   Relationships    Social connections     Talks on phone: Not on file     Gets together: Not on file     Attends Gnosticist service: Not on file     Active member of club or organization: Not on file     Attends meetings of clubs or organizations: Not on file     Relationship status: Not on file    Intimate partner violence     Fear of current or ex partner: Not on file     Emotionally abused: Not on file     Physically abused: Not on file     Forced sexual activity: Not on file   Other Topics Concern    Not on file   Social History Narrative    Not on file       Family Hx:   Family History   Problem Relation Age of Onset    Cancer Other     Diabetes Other     Stroke Other        Physical Exam:  Vital Signs: BP (!) 145/79   Pulse 73   Temp 97.8 °F (36.6 °C) (Oral)   Resp 18   Ht 5' 8\" (1.727 m)   Wt 202 lb (91.6 kg)   SpO2 98%   BMI 30.71 kg/m²    Pulmonary: Normal  Cardiac: Normal  Abdomen: Normal    Pre-Procedure Assessment / Plan:  ASA Classification: Class 2 - A normal healthy patient with mild systemic disease  Level of Sedation Plan: Moderate sedation   Mallampati Score:  I (soft palate, uvula, fauces, tonsillar pillars visible)  Post Procedure plan: Return to same level of care    Colonoscopy Interval History:  3 or more years since last colonoscopy, Less than 3 years since the patient's last colonoscopy due to medical reasons and Less than 3 years since the patient's last colonoscopy due to system reasons    Medical Reason for Colonoscopy before 3 years last colonoscopy incomplete, last colonoscopy had inadequate

## 2020-06-22 ENCOUNTER — HOSPITAL ENCOUNTER (OUTPATIENT)
Dept: NON INVASIVE DIAGNOSTICS | Age: 80
Discharge: HOME | End: 2020-06-22
Payer: MEDICARE

## 2020-06-22 ENCOUNTER — HOSPITAL ENCOUNTER (OUTPATIENT)
Dept: VASCULAR LAB | Age: 80
Discharge: HOME | End: 2020-06-22
Payer: MEDICARE

## 2020-06-22 ENCOUNTER — HOSPITAL ENCOUNTER (OUTPATIENT)
Dept: CT IMAGING | Age: 80
Discharge: HOME | End: 2020-06-22
Payer: MEDICARE

## 2020-06-22 LAB
GFR AFRICAN AMERICAN: >60
GFR NON-AFRICAN AMERICAN: >60
LV EF: 58 %
LVEF MODALITY: NORMAL
PERFORMED ON: NORMAL
PERFORMED ON: NORMAL
POC CREATININE: 1.1 MG/DL (ref 0.8–1.3)
POC SAMPLE TYPE: NORMAL
POC SAMPLE TYPE: NORMAL

## 2020-06-22 PROCEDURE — 6360000004 HC RX CONTRAST MEDICATION: Performed by: INTERNAL MEDICINE

## 2020-06-22 PROCEDURE — 82565 ASSAY OF CREATININE: CPT

## 2020-06-22 PROCEDURE — 93306 TTE W/DOPPLER COMPLETE: CPT

## 2020-06-22 PROCEDURE — 93880 EXTRACRANIAL BILAT STUDY: CPT

## 2020-06-22 PROCEDURE — 70470 CT HEAD/BRAIN W/O & W/DYE: CPT

## 2020-06-22 RX ADMIN — IOPAMIDOL 80 ML: 755 INJECTION, SOLUTION INTRAVENOUS at 08:36

## 2020-11-27 ENCOUNTER — HOSPITAL ENCOUNTER (INPATIENT)
Age: 80
LOS: 2 days | Discharge: HOME | DRG: 378 | End: 2020-11-29
Attending: EMERGENCY MEDICINE | Admitting: INTERNAL MEDICINE
Payer: MEDICARE

## 2020-11-27 PROBLEM — K92.2 GI BLEED: Status: ACTIVE | Noted: 2020-11-27

## 2020-11-27 LAB
A/G RATIO: 1.5 (ref 1.1–2.2)
ABO/RH: NORMAL
ALBUMIN SERPL-MCNC: 4.1 G/DL (ref 3.4–5)
ALP BLD-CCNC: 55 U/L (ref 40–129)
ALT SERPL-CCNC: 10 U/L (ref 10–40)
ANION GAP SERPL CALCULATED.3IONS-SCNC: 9 MMOL/L (ref 3–16)
ANTIBODY SCREEN: NORMAL
APTT: 29.7 SEC (ref 24.2–36.2)
AST SERPL-CCNC: 15 U/L (ref 15–37)
BASE EXCESS VENOUS: 2.2 MMOL/L (ref -2–3)
BASOPHILS ABSOLUTE: 0 K/UL (ref 0–0.2)
BASOPHILS RELATIVE PERCENT: 0.7 %
BILIRUB SERPL-MCNC: 0.7 MG/DL (ref 0–1)
BUN BLDV-MCNC: 19 MG/DL (ref 7–20)
CALCIUM SERPL-MCNC: 9.3 MG/DL (ref 8.3–10.6)
CARBOXYHEMOGLOBIN: 1.4 % (ref 0–1.5)
CHLORIDE BLD-SCNC: 100 MMOL/L (ref 99–110)
CO2: 29 MMOL/L (ref 21–32)
CREAT SERPL-MCNC: 1.2 MG/DL (ref 0.8–1.3)
EKG ATRIAL RATE: 61 BPM
EKG DIAGNOSIS: NORMAL
EKG P AXIS: -2 DEGREES
EKG P-R INTERVAL: 148 MS
EKG Q-T INTERVAL: 396 MS
EKG QRS DURATION: 70 MS
EKG QTC CALCULATION (BAZETT): 398 MS
EKG R AXIS: 12 DEGREES
EKG T AXIS: 72 DEGREES
EKG VENTRICULAR RATE: 61 BPM
EOSINOPHILS ABSOLUTE: 0.1 K/UL (ref 0–0.6)
EOSINOPHILS RELATIVE PERCENT: 1.2 %
GFR AFRICAN AMERICAN: >60
GFR NON-AFRICAN AMERICAN: 58
GLOBULIN: 2.7 G/DL
GLUCOSE BLD-MCNC: 141 MG/DL (ref 70–99)
GLUCOSE BLD-MCNC: 208 MG/DL (ref 70–99)
HCO3 VENOUS: 29.5 MMOL/L (ref 24–28)
HCT VFR BLD CALC: 27 % (ref 40.5–52.5)
HCT VFR BLD CALC: 29.5 % (ref 40.5–52.5)
HCT VFR BLD CALC: 33.9 % (ref 40.5–52.5)
HEMOGLOBIN, VEN, REDUCED: 55.8 %
HEMOGLOBIN: 11.2 G/DL (ref 13.5–17.5)
HEMOGLOBIN: 9.1 G/DL (ref 13.5–17.5)
HEMOGLOBIN: 9.9 G/DL (ref 13.5–17.5)
INR BLD: 1 (ref 0.86–1.14)
LACTIC ACID: 0.9 MMOL/L (ref 0.4–2)
LACTIC ACID: 2.1 MMOL/L (ref 0.4–2)
LYMPHOCYTES ABSOLUTE: 1 K/UL (ref 1–5.1)
LYMPHOCYTES RELATIVE PERCENT: 13.5 %
MCH RBC QN AUTO: 29.3 PG (ref 26–34)
MCHC RBC AUTO-ENTMCNC: 33.1 G/DL (ref 31–36)
MCV RBC AUTO: 88.3 FL (ref 80–100)
METHEMOGLOBIN VENOUS: 0.7 % (ref 0–1.5)
MONOCYTES ABSOLUTE: 0.5 K/UL (ref 0–1.3)
MONOCYTES RELATIVE PERCENT: 7.1 %
NEUTROPHILS ABSOLUTE: 5.7 K/UL (ref 1.7–7.7)
NEUTROPHILS RELATIVE PERCENT: 77.5 %
O2 SAT, VEN: 43 %
PCO2, VEN: 62.5 MMHG (ref 41–51)
PDW BLD-RTO: 14.4 % (ref 12.4–15.4)
PERFORMED ON: ABNORMAL
PH VENOUS: 7.29 (ref 7.35–7.45)
PLATELET # BLD: 217 K/UL (ref 135–450)
PMV BLD AUTO: 8.8 FL (ref 5–10.5)
PO2, VEN: 30.5 MMHG (ref 25–40)
POTASSIUM REFLEX MAGNESIUM: 4.5 MMOL/L (ref 3.5–5.1)
PROTHROMBIN TIME: 11.6 SEC (ref 10–13.2)
RBC # BLD: 3.83 M/UL (ref 4.2–5.9)
SODIUM BLD-SCNC: 138 MMOL/L (ref 136–145)
TCO2 CALC VENOUS: 31 MMOL/L
TOTAL PROTEIN: 6.8 G/DL (ref 6.4–8.2)
WBC # BLD: 7.3 K/UL (ref 4–11)

## 2020-11-27 PROCEDURE — 80053 COMPREHEN METABOLIC PANEL: CPT

## 2020-11-27 PROCEDURE — 83605 ASSAY OF LACTIC ACID: CPT

## 2020-11-27 PROCEDURE — 6360000002 HC RX W HCPCS: Performed by: STUDENT IN AN ORGANIZED HEALTH CARE EDUCATION/TRAINING PROGRAM

## 2020-11-27 PROCEDURE — 85025 COMPLETE CBC W/AUTO DIFF WBC: CPT

## 2020-11-27 PROCEDURE — 86901 BLOOD TYPING SEROLOGIC RH(D): CPT

## 2020-11-27 PROCEDURE — G0378 HOSPITAL OBSERVATION PER HR: HCPCS

## 2020-11-27 PROCEDURE — 2580000003 HC RX 258: Performed by: INTERNAL MEDICINE

## 2020-11-27 PROCEDURE — 36415 COLL VENOUS BLD VENIPUNCTURE: CPT

## 2020-11-27 PROCEDURE — 86900 BLOOD TYPING SEROLOGIC ABO: CPT

## 2020-11-27 PROCEDURE — 96375 TX/PRO/DX INJ NEW DRUG ADDON: CPT

## 2020-11-27 PROCEDURE — 96361 HYDRATE IV INFUSION ADD-ON: CPT

## 2020-11-27 PROCEDURE — C9113 INJ PANTOPRAZOLE SODIUM, VIA: HCPCS | Performed by: STUDENT IN AN ORGANIZED HEALTH CARE EDUCATION/TRAINING PROGRAM

## 2020-11-27 PROCEDURE — 96374 THER/PROPH/DIAG INJ IV PUSH: CPT

## 2020-11-27 PROCEDURE — 6360000002 HC RX W HCPCS: Performed by: INTERNAL MEDICINE

## 2020-11-27 PROCEDURE — 1200000000 HC SEMI PRIVATE

## 2020-11-27 PROCEDURE — 86850 RBC ANTIBODY SCREEN: CPT

## 2020-11-27 PROCEDURE — 85610 PROTHROMBIN TIME: CPT

## 2020-11-27 PROCEDURE — 2580000003 HC RX 258: Performed by: STUDENT IN AN ORGANIZED HEALTH CARE EDUCATION/TRAINING PROGRAM

## 2020-11-27 PROCEDURE — 99283 EMERGENCY DEPT VISIT LOW MDM: CPT

## 2020-11-27 PROCEDURE — 85018 HEMOGLOBIN: CPT

## 2020-11-27 PROCEDURE — 96365 THER/PROPH/DIAG IV INF INIT: CPT

## 2020-11-27 PROCEDURE — 82803 BLOOD GASES ANY COMBINATION: CPT

## 2020-11-27 PROCEDURE — 93005 ELECTROCARDIOGRAM TRACING: CPT | Performed by: STUDENT IN AN ORGANIZED HEALTH CARE EDUCATION/TRAINING PROGRAM

## 2020-11-27 PROCEDURE — 85730 THROMBOPLASTIN TIME PARTIAL: CPT

## 2020-11-27 PROCEDURE — 96360 HYDRATION IV INFUSION INIT: CPT

## 2020-11-27 PROCEDURE — 85014 HEMATOCRIT: CPT

## 2020-11-27 PROCEDURE — 6370000000 HC RX 637 (ALT 250 FOR IP): Performed by: INTERNAL MEDICINE

## 2020-11-27 RX ORDER — POLYETHYLENE GLYCOL 3350 17 G/17G
17 POWDER, FOR SOLUTION ORAL DAILY PRN
Status: DISCONTINUED | OUTPATIENT
Start: 2020-11-27 | End: 2020-11-29 | Stop reason: HOSPADM

## 2020-11-27 RX ORDER — LOSARTAN POTASSIUM AND HYDROCHLOROTHIAZIDE 12.5; 1 MG/1; MG/1
1 TABLET ORAL DAILY
Status: DISCONTINUED | OUTPATIENT
Start: 2020-11-27 | End: 2020-11-29 | Stop reason: HOSPADM

## 2020-11-27 RX ORDER — SODIUM CHLORIDE 0.9 % (FLUSH) 0.9 %
10 SYRINGE (ML) INJECTION EVERY 12 HOURS SCHEDULED
Status: DISCONTINUED | OUTPATIENT
Start: 2020-11-27 | End: 2020-11-29 | Stop reason: HOSPADM

## 2020-11-27 RX ORDER — DEXTROSE MONOHYDRATE 25 G/50ML
12.5 INJECTION, SOLUTION INTRAVENOUS PRN
Status: DISCONTINUED | OUTPATIENT
Start: 2020-11-27 | End: 2020-11-29 | Stop reason: HOSPADM

## 2020-11-27 RX ORDER — DEXTROSE MONOHYDRATE 50 MG/ML
100 INJECTION, SOLUTION INTRAVENOUS PRN
Status: DISCONTINUED | OUTPATIENT
Start: 2020-11-27 | End: 2020-11-29 | Stop reason: HOSPADM

## 2020-11-27 RX ORDER — ASPIRIN 81 MG/1
81 TABLET ORAL EVERY EVENING
Status: ON HOLD | COMMUNITY
End: 2020-11-29 | Stop reason: HOSPADM

## 2020-11-27 RX ORDER — SODIUM CHLORIDE, SODIUM LACTATE, POTASSIUM CHLORIDE, CALCIUM CHLORIDE 600; 310; 30; 20 MG/100ML; MG/100ML; MG/100ML; MG/100ML
INJECTION, SOLUTION INTRAVENOUS CONTINUOUS
Status: DISCONTINUED | OUTPATIENT
Start: 2020-11-27 | End: 2020-11-28

## 2020-11-27 RX ORDER — PANTOPRAZOLE SODIUM 40 MG/10ML
40 INJECTION, POWDER, LYOPHILIZED, FOR SOLUTION INTRAVENOUS ONCE
Status: COMPLETED | OUTPATIENT
Start: 2020-11-27 | End: 2020-11-27

## 2020-11-27 RX ORDER — EZETIMIBE 10 MG/1
10 TABLET ORAL DAILY
Status: DISCONTINUED | OUTPATIENT
Start: 2020-11-27 | End: 2020-11-29 | Stop reason: HOSPADM

## 2020-11-27 RX ORDER — GABAPENTIN 300 MG/1
600 CAPSULE ORAL 3 TIMES DAILY
Status: DISCONTINUED | OUTPATIENT
Start: 2020-11-27 | End: 2020-11-29 | Stop reason: HOSPADM

## 2020-11-27 RX ORDER — TRAMADOL HYDROCHLORIDE 50 MG/1
50 TABLET ORAL EVERY 6 HOURS PRN
Status: DISCONTINUED | OUTPATIENT
Start: 2020-11-27 | End: 2020-11-29 | Stop reason: HOSPADM

## 2020-11-27 RX ORDER — FINASTERIDE 5 MG/1
5 TABLET, FILM COATED ORAL DAILY
Status: DISCONTINUED | OUTPATIENT
Start: 2020-11-27 | End: 2020-11-29 | Stop reason: HOSPADM

## 2020-11-27 RX ORDER — LEVOTHYROXINE SODIUM 112 UG/1
112 TABLET ORAL DAILY
Status: DISCONTINUED | OUTPATIENT
Start: 2020-11-28 | End: 2020-11-29 | Stop reason: HOSPADM

## 2020-11-27 RX ORDER — SODIUM CHLORIDE, SODIUM LACTATE, POTASSIUM CHLORIDE, CALCIUM CHLORIDE 600; 310; 30; 20 MG/100ML; MG/100ML; MG/100ML; MG/100ML
1000 INJECTION, SOLUTION INTRAVENOUS ONCE
Status: COMPLETED | OUTPATIENT
Start: 2020-11-27 | End: 2020-11-27

## 2020-11-27 RX ORDER — ONDANSETRON 2 MG/ML
4 INJECTION INTRAMUSCULAR; INTRAVENOUS EVERY 6 HOURS PRN
Status: DISCONTINUED | OUTPATIENT
Start: 2020-11-27 | End: 2020-11-29 | Stop reason: HOSPADM

## 2020-11-27 RX ORDER — NICOTINE POLACRILEX 4 MG
15 LOZENGE BUCCAL PRN
Status: DISCONTINUED | OUTPATIENT
Start: 2020-11-27 | End: 2020-11-29 | Stop reason: HOSPADM

## 2020-11-27 RX ORDER — INSULIN LISPRO 100 [IU]/ML
0-3 INJECTION, SOLUTION INTRAVENOUS; SUBCUTANEOUS NIGHTLY
Status: CANCELLED | OUTPATIENT
Start: 2020-11-27

## 2020-11-27 RX ORDER — PROMETHAZINE HYDROCHLORIDE 12.5 MG/1
12.5 TABLET ORAL EVERY 6 HOURS PRN
Status: DISCONTINUED | OUTPATIENT
Start: 2020-11-27 | End: 2020-11-29 | Stop reason: HOSPADM

## 2020-11-27 RX ORDER — SODIUM CHLORIDE 0.9 % (FLUSH) 0.9 %
10 SYRINGE (ML) INJECTION PRN
Status: DISCONTINUED | OUTPATIENT
Start: 2020-11-27 | End: 2020-11-29 | Stop reason: HOSPADM

## 2020-11-27 RX ORDER — ACETAMINOPHEN 650 MG/1
650 SUPPOSITORY RECTAL EVERY 6 HOURS PRN
Status: DISCONTINUED | OUTPATIENT
Start: 2020-11-27 | End: 2020-11-29 | Stop reason: HOSPADM

## 2020-11-27 RX ORDER — INSULIN LISPRO 100 [IU]/ML
0-6 INJECTION, SOLUTION INTRAVENOUS; SUBCUTANEOUS
Status: CANCELLED | OUTPATIENT
Start: 2020-11-27

## 2020-11-27 RX ORDER — ISOSORBIDE MONONITRATE 30 MG/1
30 TABLET, EXTENDED RELEASE ORAL DAILY
Status: DISCONTINUED | OUTPATIENT
Start: 2020-11-27 | End: 2020-11-29 | Stop reason: HOSPADM

## 2020-11-27 RX ORDER — ACETAMINOPHEN 325 MG/1
650 TABLET ORAL EVERY 6 HOURS PRN
Status: DISCONTINUED | OUTPATIENT
Start: 2020-11-27 | End: 2020-11-29 | Stop reason: HOSPADM

## 2020-11-27 RX ADMIN — FINASTERIDE 5 MG: 5 TABLET, FILM COATED ORAL at 17:47

## 2020-11-27 RX ADMIN — SODIUM CHLORIDE, POTASSIUM CHLORIDE, SODIUM LACTATE AND CALCIUM CHLORIDE 1000 ML: 600; 310; 30; 20 INJECTION, SOLUTION INTRAVENOUS at 10:48

## 2020-11-27 RX ADMIN — IRON SUCROSE 200 MG: 20 INJECTION, SOLUTION INTRAVENOUS at 17:42

## 2020-11-27 RX ADMIN — SODIUM CHLORIDE, SODIUM LACTATE, POTASSIUM CHLORIDE, AND CALCIUM CHLORIDE: 600; 310; 30; 20 INJECTION, SOLUTION INTRAVENOUS at 17:52

## 2020-11-27 RX ADMIN — GABAPENTIN 600 MG: 300 CAPSULE ORAL at 17:43

## 2020-11-27 RX ADMIN — LOSARTAN POTASSIUM AND HYDROCHLOROTHIAZIDE 1 TABLET: 100; 12.5 TABLET, FILM COATED ORAL at 17:43

## 2020-11-27 RX ADMIN — GABAPENTIN 600 MG: 300 CAPSULE ORAL at 21:08

## 2020-11-27 RX ADMIN — PANTOPRAZOLE SODIUM 40 MG: 40 INJECTION, POWDER, FOR SOLUTION INTRAVENOUS at 10:48

## 2020-11-27 RX ADMIN — EZETIMIBE 10 MG: 10 TABLET ORAL at 17:43

## 2020-11-27 RX ADMIN — ISOSORBIDE MONONITRATE 30 MG: 30 TABLET, EXTENDED RELEASE ORAL at 17:43

## 2020-11-27 ASSESSMENT — ENCOUNTER SYMPTOMS
SORE THROAT: 0
ABDOMINAL PAIN: 0
BACK PAIN: 0
VOMITING: 0
COLOR CHANGE: 0
SHORTNESS OF BREATH: 1
DIARRHEA: 0
NAUSEA: 0
PHOTOPHOBIA: 0
BLOOD IN STOOL: 1

## 2020-11-27 ASSESSMENT — PAIN SCALES - GENERAL
PAINLEVEL_OUTOF10: 0

## 2020-11-27 NOTE — ED NOTES
Bed: A01-01  Expected date:   Expected time:   Means of arrival:   Comments:  Lisandro Anna RN  11/27/20 [id number]

## 2020-11-27 NOTE — PROGRESS NOTES
Clinical Pharmacy Progress Note  Medication History     Admit Date: 11/27/2020    List of of current medications patient is taking is complete. Home Medication list in EPIC updated to reflect changes noted below. Source of information: [employer], telephone conversation with patient    Changes made to medication list:   Medications removed (no longer taking):  None    Medications added:   None    Medication doses / instructions adjusted:   Gabapentin - patient takes 600mg (2x 300mg caps) TID    Other notes:   Patient is currently wearing Home Insulin Pump which contains Humulin (insulin regular) U-500 insulin. Patient reports he is unsure of basal rate, correction factor, and other pump settings. Complete Home Medication List:    Current Outpatient Medications on File Prior to Encounter   Medication Sig    aspirin 81 MG tablet Take 1 tablet by mouth daily     finasteride (PROSCAR) 5 MG tablet Take 5 mg by mouth daily    isosorbide mononitrate (IMDUR) 30 MG extended release tablet Take 30 mg by mouth daily    gabapentin (NEURONTIN) 300 MG capsule Take 600 mg by mouth 3 times daily. levothyroxine (SYNTHROID) 112 MCG tablet Take 112 mcg by mouth Daily    insulin regular human (HUMULIN R U-500) 500 UNIT/ML concentrated injection vial Use with insulin pump for SQ injection    ezetimibe (ZETIA) 10 MG tablet Take 10 mg by mouth daily    losartan-hydrochlorothiazide (HYZAAR) 100-12.5 MG per tablet Take 1 tablet by mouth daily    furosemide (LASIX) 20 MG tablet Take 20 mg by mouth daily. tramadol (ULTRAM) 50 MG tablet Take 50 mg by mouth every 6 hours as needed. Please call with any questions!     Chapis Guzman PharmD, BCPS  Ext. [phone]

## 2020-11-27 NOTE — PROGRESS NOTES
4 Eyes Admission Assessment     I agree as the admission nurse that 2 RN's have performed a thorough Head to Toe Skin Assessment on the patient. ALL assessment sites listed below have been assessed on admission. Areas assessed by both nurses: Mendy/Joann  [x]   Head, Face, and Ears   [x]   Shoulders, Back, and Chest  [x]   Arms, Elbows, and Hands   [x]   Coccyx, Sacrum, and Ischium  [x]   Legs, Feet, and Heels        Does the Patient have Skin Breakdown?   No         Master Prevention initiated:  No   Wound Care Orders initiated:  No      [facility] nurse consulted for Pressure Injury (Stage 3,4, Unstageable, DTI, NWPT, and Complex wounds) or Master score 18 or lower:  No      Nurse 1 eSignature: Electronically signed by Aliza Acevedo RN on 11/27/20 at 3:14 PM EST    **SHARE this note so that the co-signing nurse is able to place an eSignature**    Nurse 2 eSignature: {Esignature:[id number]}

## 2020-11-27 NOTE — CONSULTS
Clinical Pharmacy Progress Note  Medication History and Home Insulin Pump Settings    Admit Date: 11/27/2020    Medication Reconciliation: Med rec completed prior to admission. Please see previous note published 11/27/2020 by Holli Covarrubias, [facility] for information regarding patient's home medication regimen. 2. Home Insulin Pump Settings:  Patient reports he is unable to recite specific insulin pump settings from memory. Patient he is currently wearing a Dexcom CGM Insulin Pump filled with Humulin (insulin regular) U-500 insulin. Pump Settings (per Dr. Michelle Chandra - most recent Endocrinology appointment note published 4/27/2020):       Basal rates:            12am: 0.1 units/hr            7am: 0.5 units/hr            9am: 0.6 units/hr            5pm: 0.6 units/hr            10pm: 0.5 units/hr       Carb ratio             12am 1:20            6am 1:12            10pm 1:20       Correction Factor             12am 1:90       Target             12am 120-140            8am 100-125    Thank you for including pharmacy in the care of this patient. Please call with questions!     Holli Covarrubias, PharmD, BCPS  Ext. [phone]

## 2020-11-27 NOTE — PROGRESS NOTES
Pt received to [facility] [id number]. Admission assessment and skin assessment complete. VSS. Pt has one bright red bloody bowel movement upon arrival to room. Pt has spinal stimulator in back and insulin pump and glucometer in the abdomen. Pt signed form allowing use of insulin pump. Pt has no complaints at this time. PT oriented to room and call light system. Ambulates independently with steady gait in room. Will continue to monitor.

## 2020-11-27 NOTE — ED PROVIDER NOTES
ED Attending Attestation Note     Date of evaluation: 11/27/2020    This patient was seen by the resident. I have seen and examined the patient, agree with the workup, evaluation, management and diagnosis. The care plan has been discussed. I have reviewed the ECG and concur with the resident's interpretation. My assessment reveals patient with fatigue after seeing blood in toilet today. Has h/o diverticular bleed. Hb stable currently but continues to bleed. D/W GI and will admit to further workup and treat.      Bridgette Arshad MD  11/27/20 9801

## 2020-11-27 NOTE — ED PROVIDER NOTES
Blood transfusion, CAD (coronary artery disease), Cancer ([facility] [address] Cataract, Diabetes, Heart disease, High blood pressure, Sleep apnea, Thyroid disease, and Vascular disease. He has a past surgical history that includes back surgery; Elbow surgery (5/10/11); Cardiac surgery; Abdomen surgery; Appendectomy; Uvulopalatopharygoplasty; Cataract removal with implant (2011); eye surgery (Bilateral, 12/20/11); joint replacement (Left, toe metatarsal); Cholecystectomy, laparoscopic (12/5/2013); Total shoulder arthroplasty (Right); Total knee arthroplasty (Bilateral); Spine surgery (01/2019); Colonoscopy (N/A, 5/6/2019); and Colonoscopy (N/A, 5/18/2020). His family history includes Cancer in an other family member; Diabetes in an other family member; Stroke in an other family member. He reports that he has never smoked. He has never used smokeless tobacco. He reports that he does not drink alcohol or use drugs. Medications     Current Discharge Medication List      CONTINUE these medications which have NOT CHANGED    Details   aspirin 81 MG EC tablet Take 81 mg by mouth every evening      finasteride (PROSCAR) 5 MG tablet Take 5 mg by mouth nightly       isosorbide mononitrate (IMDUR) 30 MG extended release tablet Take 30 mg by mouth daily      gabapentin (NEURONTIN) 300 MG capsule Take 600 mg by mouth 3 times daily. levothyroxine (SYNTHROID) 112 MCG tablet Take 112 mcg by mouth Daily      insulin regular human (HUMULIN R U-500) 500 UNIT/ML concentrated injection vial Inject into the skin Use with insulin pump      ezetimibe (ZETIA) 10 MG tablet Take 10 mg by mouth daily      losartan-hydrochlorothiazide (HYZAAR) 100-12.5 MG per tablet Take 1 tablet by mouth daily      furosemide (LASIX) 20 MG tablet Take 20 mg by mouth daily. tramadol (ULTRAM) 50 MG tablet Take 50 mg by mouth every 6 hours as needed.       blood glucose test strips (ASCENSIA AUTODISC VI;ONE TOUCH ULTRA TEST VI) strip Test 6 times daily Diagnosis E11.9      B-D ULTRAFINE III SHORT PEN 31G X 8 MM MISC       INSULIN INFUSION PUMP by Does not apply route Uses U-500 insulin             Allergies     He is allergic to bactrim; mupirocin; neomycin-bacitracin zn-polymyx; polysporin [bacitracin-polymyxin b]; silver; sulfa antibiotics; tape [adhesive tape]; wound dressings; and ancef [cefazolin sodium]. Physical Exam     INITIAL VITALS: BP: (!) 164/70, Temp: 98.9 °F (37.2 °C), Pulse: 65, Resp: 16, SpO2: 100 %   Physical Exam  Vitals signs and nursing note reviewed. Constitutional:       General: He is not in acute distress. Appearance: Normal appearance. HENT:      Mouth/Throat:      Mouth: Mucous membranes are moist.   Eyes:      Extraocular Movements: Extraocular movements intact. Pupils: Pupils are equal, round, and reactive to light. Cardiovascular:      Rate and Rhythm: Normal rate and regular rhythm. Heart sounds: Normal heart sounds. No murmur. Pulmonary:      Effort: Pulmonary effort is normal. No respiratory distress. Breath sounds: Normal breath sounds. No wheezing or rales. Abdominal:      General: There is no distension. Palpations: Abdomen is soft. Tenderness: There is no abdominal tenderness. There is no guarding. Musculoskeletal:      Right lower leg: No edema. Left lower leg: No edema. Skin:     General: Skin is warm and dry. Coloration: Skin is pale. Neurological:      General: No focal deficit present. Mental Status: He is alert. Psychiatric:         Mood and Affect: Mood normal.         Behavior: Behavior normal.         DiagnosticResults     EKG   Interpreted in conjunction with emergencydepartment physician No att. providers found  Normal sinus rhythm, rate 61. Normal axis. Normal CO, QRS, QT intervals. No acute ST/T wave changes. Compared to prior EKG 4/24/2020 appears unchanged.     RADIOLOGY:  No orders to display       LABS:   Results for orders placed or performed during the hospital encounter of 11/27/20   CBC Auto Differential   Result Value Ref Range    WBC 7.3 4.0 - 11.0 K/uL    RBC 3.83 (L) 4.20 - 5.90 M/uL    Hemoglobin 11.2 (L) 13.5 - 17.5 g/dL    Hematocrit 33.9 (L) 40.5 - 52.5 %    MCV 88.3 80.0 - 100.0 fL    MCH 29.3 26.0 - 34.0 pg    MCHC 33.1 31.0 - 36.0 g/dL    RDW 14.4 12.4 - 15.4 %    Platelets 554 717 - 557 K/uL    MPV 8.8 5.0 - 10.5 fL    Neutrophils % 77.5 %    Lymphocytes % 13.5 %    Monocytes % 7.1 %    Eosinophils % 1.2 %    Basophils % 0.7 %    Neutrophils Absolute 5.7 1.7 - 7.7 K/uL    Lymphocytes Absolute 1.0 1.0 - 5.1 K/uL    Monocytes Absolute 0.5 0.0 - 1.3 K/uL    Eosinophils Absolute 0.1 0.0 - 0.6 K/uL    Basophils Absolute 0.0 0.0 - 0.2 K/uL   Comprehensive Metabolic Panel w/ Reflex to MG   Result Value Ref Range    Sodium 138 136 - 145 mmol/L    Potassium reflex Magnesium 4.5 3.5 - 5.1 mmol/L    Chloride 100 99 - 110 mmol/L    CO2 29 21 - 32 mmol/L    Anion Gap 9 3 - 16    Glucose 208 (H) 70 - 99 mg/dL    BUN 19 7 - 20 mg/dL    CREATININE 1.2 0.8 - 1.3 mg/dL    GFR Non-African American 58 (A) >60    GFR African American >60 >60    Calcium 9.3 8.3 - 10.6 mg/dL    Total Protein 6.8 6.4 - 8.2 g/dL    Alb 4.1 3.4 - 5.0 g/dL    Albumin/Globulin Ratio 1.5 1.1 - 2.2    Total Bilirubin 0.7 0.0 - 1.0 mg/dL    Alkaline Phosphatase 55 40 - 129 U/L    ALT 10 10 - 40 U/L    AST 15 15 - 37 U/L    Globulin 2.7 g/dL   Lactic Acid, Plasma   Result Value Ref Range    Lactic Acid 2.1 (H) 0.4 - 2.0 mmol/L   Blood Gas, Venous   Result Value Ref Range    pH, Alvino 7.295 (L) 7.350 - 7.450    pCO2, Alvino 62.5 (H) 41.0 - 51.0 mmHg    pO2, Alvino 30.5 25.0 - 40.0 mmHg    HCO3, Venous 29.5 (H) 24.0 - 28.0 mmol/L    Base Excess, Alvino 2.2 -2.0 - 3.0 mmol/L    O2 Sat, Alvino 43 Not established %    Carboxyhemoglobin 1.4 0.0 - 1.5 %    MetHgb, Alvino 0.7 0.0 - 1.5 %    TC02 (Calc), Alvino 31 mmol/L    Hemoglobin, Alvino, Reduced 55.80 %   PT - INR   Result Value Ref Range    Protime 11.6 10.0 - 13.2 sec INR 1.00 0.86 - 1.14   PTT   Result Value Ref Range    aPTT 29.7 24.2 - 36.2 sec   Hemoglobin and hematocrit, blood   Result Value Ref Range    Hemoglobin 9.9 (L) 13.5 - 17.5 g/dL    Hematocrit 29.5 (L) 40.5 - 52.5 %   Lactic acid, plasma   Result Value Ref Range    Lactic Acid 0.9 0.4 - 2.0 mmol/L   EKG 12 Lead   Result Value Ref Range    Ventricular Rate 61 BPM    Atrial Rate 61 BPM    P-R Interval 148 ms    QRS Duration 70 ms    Q-T Interval 396 ms    QTc Calculation (Bazett) 398 ms    P Axis -2 degrees    R Axis 12 degrees    T Axis 72 degrees    Diagnosis       EKG performed in ER and to be interpreted by ER physician. Confirmed by MD, ER ([id number]),  Charan Meyers ([id number]) on 11/27/2020 12:13:54 PM   TYPE AND SCREEN   Result Value Ref Range    ABO/Rh O POS     Antibody Screen NEG            RECENT VITALS:  BP: (!) 123/44, Temp: 97.7 °F (36.5 °C), Pulse: 54,Resp: 16, SpO2: 97 %     Procedures       ED Course     Nursing Notes, Past Medical Hx, Past Surgical Hx, Social Hx, Allergies, and Family Hx were reviewed.     The patient was given the followingmedications:  Orders Placed This Encounter   Medications    pantoprazole (PROTONIX) injection 40 mg    lactated ringers infusion 1,000 mL    ezetimibe (ZETIA) tablet 10 mg    finasteride (PROSCAR) tablet 5 mg    gabapentin (NEURONTIN) capsule 600 mg    isosorbide mononitrate (IMDUR) extended release tablet 30 mg    levothyroxine (SYNTHROID) tablet 112 mcg    losartan-hydroCHLOROthiazide (HYZAAR) 100-12.5 MG per tablet 1 tablet    traMADol (ULTRAM) tablet 50 mg    iron sucrose (VENOFER) 200 mg in sodium chloride 0.9 % 100 mL IVPB    lactated ringers infusion    sodium chloride flush 0.9 % injection 10 mL    sodium chloride flush 0.9 % injection 10 mL    OR Linked Order Group     acetaminophen (TYLENOL) tablet 650 mg     acetaminophen (TYLENOL) suppository 650 mg    polyethylene glycol (GLYCOLAX) packet 17 g    OR Linked Order Group     promethazine specified. DISCHARGE MEDICATIONS:  Current Discharge Medication List          DISPOSITION    Admitted to medicine in stable condition.      Danae Ely MD  11/27/20 [id number]

## 2020-11-27 NOTE — PLAN OF CARE
Problem: Pain:  Goal: Pain level will decrease  Description: Pain level will decrease  Outcome: Ongoing  Note: Pt complains of no pain at this time. Will continue to monitor.

## 2020-11-27 NOTE — H&P
Hospital Medicine History & Physical      PCP: Sabrina Quiros MD    Date of Admission: 11/27/2020    Date of Service: Pt seen/examined on 11/27/2020 and Admitted to Inpatient with expected LOS greater than two midnights due to medical therapy. Chief Complaint:  Blood per rectum      History Of Present Illness: The patient is a 79 y.o. male with medical history as below, most notable for CAD, HTN, DM and known diverticulosis, who presents to [facility] with blood pre rectum that started early this morning. Last BM was yesterday and was normal. Patient denies any straining today and had spontaneous blood per rectum. It filled the toilet bowl. Denies any nausea, emesis or abdominal pain. Takes low dose aspirin at home. Previously had lower diverticular GI bleed in May 2020. During acute event he was treated conservatively and had colonoscopy 1 week after discharge with removal of one polyp. This is his first recurrence since May 2020. He feels tired from not getting enough rest but denies any chest pain, dizziness, shortness of breath.      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    Cataract     Diabetes     Heart disease     High blood pressure     Sleep apnea     no machine    Thyroid disease     Vascular disease        Past Surgical History:        Procedure Laterality Date    ABDOMEN SURGERY      Bowel surgery where a portion of his bowel was removed    APPENDECTOMY      BACK SURGERY      twice    CARDIAC SURGERY      bypass x4    CATARACT REMOVAL WITH IMPLANT  2011    right eye    CHOLECYSTECTOMY, LAPAROSCOPIC  12/5/2013    w/cholangiogram    COLONOSCOPY N/A 5/6/2019    COLONOSCOPY WITH BIOPSY performed by Glenn Tsai MD at [address] [facility] [address] 5/18/2020    COLONOSCOPY WITH BIOPSY performed by Glenn Tsai MD at [address] [facility]  5/10/11    Left ulnar nerve decompression    EYE SURGERY Bilateral 12/20/11    Left Eye Cataract Removal    JOINT REPLACEMENT Left toe metatarsal    SHOULDER ARTHROPLASTY Right     SPINE SURGERY  01/2019    Spinal cord stimulator    TOTAL KNEE ARTHROPLASTY Bilateral     UVULOPALATOPHARYGOPLASTY         Medications Prior to Admission:    Prior to Admission medications    Medication Sig Start Date End Date Taking? Authorizing Provider   aspirin 81 MG tablet Take 1 tablet by mouth daily Resume Asprin when okay with GI. 4/26/20   Jose Alejandro Mckeon MD   finasteride (PROSCAR) 5 MG tablet Take 5 mg by mouth daily    Historical Provider, MD   isosorbide mononitrate (IMDUR) 30 MG extended release tablet Take 30 mg by mouth daily    Historical Provider, MD   blood glucose test strips (ASCENSIA AUTODISC VI;ONE TOUCH ULTRA TEST VI) strip Test 6 times daily Diagnosis E11.9 2/23/19   Historical Provider, MD MADISON ULTRAFINE III SHORT PEN 31G X 8 MM MISC  2/20/19   Historical Provider, MD   acetaminophen (TYLENOL) 500 MG tablet Take 500 mg by mouth every 6 hours as needed 6/7/17   Historical Provider, MD   gabapentin (NEURONTIN) 600 MG tablet Take 200 mg by mouth 3 times daily. Pt states he takes 200 mg TID    Historical Provider, MD   levothyroxine (SYNTHROID) 112 MCG tablet Take 112 mcg by mouth Daily    Historical Provider, MD   insulin regular human (HUMULIN R U-500) 500 UNIT/ML concentrated injection vial Inject into the skin Use with insulin pump    Historical Provider, MD   ezetimibe (ZETIA) 10 MG tablet Take 10 mg by mouth daily    Historical Provider, MD   losartan-hydrochlorothiazide (HYZAAR) 100-12.5 MG per tablet Take 1 tablet by mouth daily    Historical Provider, MD   INSULIN INFUSION PUMP by Does not apply route Uses U-500 insulin    Historical Provider, MD   furosemide (LASIX) 20 MG tablet Take 20 mg by mouth daily.  3/30/11   Historical Provider, MD   tramadol (ULTRAM) 50 MG tablet Take 50 mg by mouth tricuspid regurgitation   Estimated pulmonary artery systolic pressure is elevated at 36 mmHg assuming   a right atrial pressure of 3 mmHg. CBC   Recent Labs     11/27/20  1030   WBC 7.3   HGB 11.2*   HCT 33.9*         RENAL  Recent Labs     11/27/20  1030      K 4.5      CO2 29   BUN 19   CREATININE 1.2     LFT'S  Recent Labs     11/27/20  1030   AST 15   ALT 10   BILITOT 0.7   ALKPHOS 55     COAG  Recent Labs     11/27/20  1030   INR 1.00     CARDIAC ENZYMES  No results for input(s): CKTOTAL, CKMB, CKMBINDEX, TROPONINI in the last 72 hours. U/A:  No results found for: NITRITE, COLORU, WBCUA, RBCUA, MUCUS, BACTERIA, CLARITYU, SPECGRAV, LEUKOCYTESUR, BLOODU, GLUCOSEU, AMORPHOUS    ABG  No results found for: UBX1LSM, BEART, Q0CWSLPR, PHART, THGBART, FHS4GMN, PO2ART, GOD0AVZ        Active Hospital Problems    Diagnosis Date Noted    GI bleed [K92.2] 11/27/2020         ASSESSMENT/PLAN:    Lower GI bleed:  Suspecting diverticular bleed. Hold aspirin. Place on bowel rest and IVF and monitor for recurrence of bleeding closely. GI was consulted from ER- will follow further recommendations    Anemia:  Of acute blood loss. Will give venofer while here. Follow H&H serially, next check ~4pm  Baseline Hgb ~12. HTN, CAD:  Cont with imdur,  Hyzaar, zetia, holding aspirin    DM type 2:  Patient uses U500 insulin pump. Will ask pharmacy to verify it and continue while here. Will suspend the pump if period of NPO is prolonged    DVT Prophylaxis: SCD  Diet: No diet orders on file  Code Status: Prior  PT/OT Eval Status: at baseline    Akash Mendez MD    Thank you Sabrina Quiros MD for the opportunity to be involved in this patient's care. If you have any questions or concerns please feel free to contact me at [phone].

## 2020-11-28 LAB
ANION GAP SERPL CALCULATED.3IONS-SCNC: 5 MMOL/L (ref 3–16)
BUN BLDV-MCNC: 16 MG/DL (ref 7–20)
CALCIUM SERPL-MCNC: 9 MG/DL (ref 8.3–10.6)
CHLORIDE BLD-SCNC: 102 MMOL/L (ref 99–110)
CO2: 30 MMOL/L (ref 21–32)
CREAT SERPL-MCNC: 1.1 MG/DL (ref 0.8–1.3)
GFR AFRICAN AMERICAN: >60
GFR NON-AFRICAN AMERICAN: >60
GLUCOSE BLD-MCNC: 102 MG/DL (ref 70–99)
GLUCOSE BLD-MCNC: 125 MG/DL (ref 70–99)
GLUCOSE BLD-MCNC: 143 MG/DL (ref 70–99)
GLUCOSE BLD-MCNC: 146 MG/DL (ref 70–99)
GLUCOSE BLD-MCNC: 179 MG/DL (ref 70–99)
GLUCOSE BLD-MCNC: 183 MG/DL (ref 70–99)
GLUCOSE BLD-MCNC: 216 MG/DL (ref 70–99)
HCT VFR BLD CALC: 24.8 % (ref 40.5–52.5)
HCT VFR BLD CALC: 26.5 % (ref 40.5–52.5)
HCT VFR BLD CALC: 27.1 % (ref 40.5–52.5)
HCT VFR BLD CALC: 27.3 % (ref 40.5–52.5)
HEMOGLOBIN: 8.4 G/DL (ref 13.5–17.5)
HEMOGLOBIN: 8.9 G/DL (ref 13.5–17.5)
HEMOGLOBIN: 9 G/DL (ref 13.5–17.5)
HEMOGLOBIN: 9.1 G/DL (ref 13.5–17.5)
PERFORMED ON: ABNORMAL
POTASSIUM REFLEX MAGNESIUM: 4.3 MMOL/L (ref 3.5–5.1)
SODIUM BLD-SCNC: 137 MMOL/L (ref 136–145)

## 2020-11-28 PROCEDURE — 80048 BASIC METABOLIC PNL TOTAL CA: CPT

## 2020-11-28 PROCEDURE — G0378 HOSPITAL OBSERVATION PER HR: HCPCS

## 2020-11-28 PROCEDURE — 1200000000 HC SEMI PRIVATE

## 2020-11-28 PROCEDURE — 85014 HEMATOCRIT: CPT

## 2020-11-28 PROCEDURE — 96366 THER/PROPH/DIAG IV INF ADDON: CPT

## 2020-11-28 PROCEDURE — 2580000003 HC RX 258: Performed by: INTERNAL MEDICINE

## 2020-11-28 PROCEDURE — 85018 HEMOGLOBIN: CPT

## 2020-11-28 PROCEDURE — 6370000000 HC RX 637 (ALT 250 FOR IP): Performed by: INTERNAL MEDICINE

## 2020-11-28 PROCEDURE — 36415 COLL VENOUS BLD VENIPUNCTURE: CPT

## 2020-11-28 PROCEDURE — 6360000002 HC RX W HCPCS: Performed by: INTERNAL MEDICINE

## 2020-11-28 RX ADMIN — IRON SUCROSE 200 MG: 20 INJECTION, SOLUTION INTRAVENOUS at 16:12

## 2020-11-28 RX ADMIN — GABAPENTIN 600 MG: 300 CAPSULE ORAL at 08:12

## 2020-11-28 RX ADMIN — LEVOTHYROXINE SODIUM 112 MCG: 112 TABLET ORAL at 06:57

## 2020-11-28 RX ADMIN — ISOSORBIDE MONONITRATE 30 MG: 30 TABLET, EXTENDED RELEASE ORAL at 08:12

## 2020-11-28 RX ADMIN — LOSARTAN POTASSIUM AND HYDROCHLOROTHIAZIDE 1 TABLET: 100; 12.5 TABLET, FILM COATED ORAL at 08:12

## 2020-11-28 RX ADMIN — TRAMADOL HYDROCHLORIDE 50 MG: 50 TABLET, FILM COATED ORAL at 08:12

## 2020-11-28 RX ADMIN — TRAMADOL HYDROCHLORIDE 50 MG: 50 TABLET, FILM COATED ORAL at 20:42

## 2020-11-28 RX ADMIN — GABAPENTIN 600 MG: 300 CAPSULE ORAL at 14:34

## 2020-11-28 RX ADMIN — EZETIMIBE 10 MG: 10 TABLET ORAL at 08:12

## 2020-11-28 RX ADMIN — GABAPENTIN 600 MG: 300 CAPSULE ORAL at 20:42

## 2020-11-28 RX ADMIN — FINASTERIDE 5 MG: 5 TABLET, FILM COATED ORAL at 08:12

## 2020-11-28 RX ADMIN — Medication 10 ML: at 20:43

## 2020-11-28 RX ADMIN — TRAMADOL HYDROCHLORIDE 50 MG: 50 TABLET, FILM COATED ORAL at 14:34

## 2020-11-28 ASSESSMENT — PAIN DESCRIPTION - LOCATION
LOCATION: FOOT

## 2020-11-28 ASSESSMENT — PAIN DESCRIPTION - FREQUENCY
FREQUENCY: CONTINUOUS

## 2020-11-28 ASSESSMENT — PAIN SCALES - GENERAL
PAINLEVEL_OUTOF10: 0
PAINLEVEL_OUTOF10: 3
PAINLEVEL_OUTOF10: 10
PAINLEVEL_OUTOF10: 0
PAINLEVEL_OUTOF10: 0
PAINLEVEL_OUTOF10: 10
PAINLEVEL_OUTOF10: 7
PAINLEVEL_OUTOF10: 4

## 2020-11-28 ASSESSMENT — PAIN DESCRIPTION - PROGRESSION
CLINICAL_PROGRESSION: GRADUALLY WORSENING
CLINICAL_PROGRESSION: GRADUALLY WORSENING
CLINICAL_PROGRESSION: NOT CHANGED

## 2020-11-28 ASSESSMENT — PAIN - FUNCTIONAL ASSESSMENT: PAIN_FUNCTIONAL_ASSESSMENT: ACTIVITIES ARE NOT PREVENTED

## 2020-11-28 ASSESSMENT — PAIN DESCRIPTION - ONSET
ONSET: ON-GOING

## 2020-11-28 ASSESSMENT — PAIN DESCRIPTION - PAIN TYPE
TYPE: CHRONIC PAIN

## 2020-11-28 ASSESSMENT — PAIN DESCRIPTION - ORIENTATION
ORIENTATION: RIGHT;LEFT

## 2020-11-28 ASSESSMENT — PAIN DESCRIPTION - DESCRIPTORS
DESCRIPTORS: BURNING;SHARP
DESCRIPTORS: BURNING;SHARP
DESCRIPTORS: NUMBNESS;TINGLING;BURNING

## 2020-11-28 NOTE — PROGRESS NOTES
RANGE:  Systolic (45VBT), XRW:626 , Min:106 , IQR:043   ; Diastolic (23GGG), SCL:21, Min:44, Max:70    PULSE OXIMETRY RANGE: SpO2  Av.1 %  Min: 92 %  Max: 100 %  24HR INTAKE/OUTPUT:      Intake/Output Summary (Last 24 hours) at 2020 1218  Last data filed at 2020 0558  Gross per 24 hour   Intake 1000 ml   Output 0 ml   Net 1000 ml       Exam:      General appearance: No apparent distress, appears stated age and cooperative. Lungs: Clear to ascultation, bilaterally without Rales/Wheezes/Rhonchi with good respiratory effort. Heart: Regular rate and rhythm with Normal S1/S2 without  murmurs, rubs or gallops, point of maximum impulse non-displaced  Abdomen: Soft, non-tender or non-distended without rigidity or guarding and positive bowel sounds all four quadrants. Extremities: No clubbing, cyanosis, or edema bilaterally. Skin: Skin color, texture, turgor normal.  Neurologic: Alert and oriented X 3,   grossly non-focal.  Mental status: Alert, oriented, thought content appropriate. Data    Recent Labs     20  1030  20  2148 20  0505 20  1051   WBC 7.3  --   --   --   --    HGB 11.2*   < > 9.1* 9.0* 8.9*   HCT 33.9*   < > 27.0* 26.5* 27.1*     --   --   --   --     < > = values in this interval not displayed. Recent Labs     20  1030 20  0505    137   K 4.5 4.3    102   CO2 29 30   BUN 19 16   CREATININE 1.2 1.1     Recent Labs     20  1030   AST 15   ALT 10   BILITOT 0.7   ALKPHOS 55     Recent Labs     20  1030   INR 1.00     No results for input(s): CKTOTAL, CKMB, CKMBINDEX, TROPONINI in the last 72 hours.     Consults:     IP CONSULT TO GI  IP CONSULT TO PRIMARY CARE PROVIDER  IP CONSULT TO HOSPITALIST  IP CONSULT TO PHARMACY  IP CONSULT TO PHARMACY  IP CONSULT TO GI    Active Hospital Problems    Diagnosis Date Noted    GI bleed [K92.2] 2020         ASSESSMENT AND PLAN      Lower GI bleed:  Suspecting diverticular bleed. Holding aspirin. Will advance diet and monitor for bleeding     Anemia:  Of acute blood loss. cont venofer while here. Follow H&H serially  Baseline Hgb ~12.     HTN, CAD:  Cont with imdur,  Hyzaar, zetia, holding aspirin     DM type 2:  Patient uses U500 insulin pump. Pump was verified by pharmacy and okay to use.          DVT Prophylaxis: SCD  Diet: Diet NPO Effective Now Exceptions are: Ice Chips, Sips with Meds, Popsicles  Code Status: Full Code    PT/OT Mariann Smith MD

## 2020-11-28 NOTE — PLAN OF CARE
Problem: Pain:  Goal: Pain level will decrease  Description: Pain level will decrease  11/28/2020 1015 by Jocelynn Fernandez, RN  Outcome: Ongoing  Note: Pt c/o chronic foot pain. Medicated with prn tramadol. Will monitor. Problem: Fluid Volume - Imbalance:  Goal: Will show no signs and symptoms of excessive bleeding  Description: Will show no signs and symptoms of excessive bleeding  11/28/2020 1015 by Jocelynn Fernandez, RN  Outcome: Ongoing  Note: No noted bloody BMs. Pt had bloody BMs overnight. GI consult, pt NPO. H/H stable, but below baseline. Will monitor.

## 2020-11-28 NOTE — CONSULTS
[facility]   GI Consult Note      Reason for Consult:  Lower GI bleeding  Requesting Physician:  Imer Or COMPLAINT:  Rectal bleeding    History Obtained From:  patient    HISTORY OF PRESENT ILLNESS:                The patient is a 79 y.o. male with significant past medical history of a diverticular bleed in 5/20. He underwent a colonoscopy one week after the bleed and had a benign polyp removed. The colonoscopy showed diverticulosis. He is admitted with three episodes of rectal bleeding. His hemoglobin went from 11 to 9. He has not had any further bleeding. He is hemodynamically stable.      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    Cataract     Diabetes     Heart disease     High blood pressure     Sleep apnea     no machine    Thyroid disease     Vascular disease      Past Surgical History:        Procedure Laterality Date    ABDOMEN SURGERY      Bowel surgery where a portion of his bowel was removed    APPENDECTOMY      BACK SURGERY      twice    CARDIAC SURGERY      bypass x4    CATARACT REMOVAL WITH IMPLANT  2011    right eye    CHOLECYSTECTOMY, LAPAROSCOPIC  12/5/2013    w/cholangiogram    COLONOSCOPY N/A 5/6/2019    COLONOSCOPY WITH BIOPSY performed by Adarsh Smith MD at [address] [facility] [address] 5/18/2020    COLONOSCOPY WITH BIOPSY performed by Adarsh Smith MD at [address] [facility]  5/10/11    Left ulnar nerve decompression    EYE SURGERY Bilateral 12/20/11    Left Eye Cataract Removal    JOINT REPLACEMENT Left toe metatarsal    SHOULDER ARTHROPLASTY Right     SPINE SURGERY  01/2019    Spinal cord stimulator    TOTAL KNEE ARTHROPLASTY Bilateral     UVULOPALATOPHARYGOPLASTY       Current Medications:    Current Facility-Administered Medications: ezetimibe (ZETIA) tablet 10 mg, 10 mg, Oral, Daily  finasteride (PROSCAR) tablet 5 mg, 5 mg, Oral, Daily  gabapentin (NEURONTIN) capsule 600 mg, 600 mg, Oral, TID  isosorbide mononitrate (IMDUR) extended release tablet 30 mg, 30 mg, Oral, Daily  levothyroxine (SYNTHROID) tablet 112 mcg, 112 mcg, Oral, Daily  losartan-hydroCHLOROthiazide (HYZAAR) 100-12.5 MG per tablet 1 tablet, 1 tablet, Oral, Daily  traMADol (ULTRAM) tablet 50 mg, 50 mg, Oral, Q6H PRN  iron sucrose (VENOFER) 200 mg in sodium chloride 0.9 % 100 mL IVPB, 200 mg, Intravenous, Q24H  lactated ringers infusion, , Intravenous, Continuous  sodium chloride flush 0.9 % injection 10 mL, 10 mL, Intravenous, 2 times per day  sodium chloride flush 0.9 % injection 10 mL, 10 mL, Intravenous, PRN  acetaminophen (TYLENOL) tablet 650 mg, 650 mg, Oral, Q6H PRN **OR** acetaminophen (TYLENOL) suppository 650 mg, 650 mg, Rectal, Q6H PRN  polyethylene glycol (GLYCOLAX) packet 17 g, 17 g, Oral, Daily PRN  promethazine (PHENERGAN) tablet 12.5 mg, 12.5 mg, Oral, Q6H PRN **OR** ondansetron (ZOFRAN) injection 4 mg, 4 mg, Intravenous, Q6H PRN  Insulin Pump - insulin regular U-500 (CONC) (Patient Supplied), 0.6 Units/hr, Subcutaneous, Continuous  glucose (GLUTOSE) 40 % oral gel 15 g, 15 g, Oral, PRN  dextrose 50 % IV solution, 12.5 g, Intravenous, PRN  glucagon (rDNA) injection 1 mg, 1 mg, Intramuscular, PRN  dextrose 5 % solution, 100 mL/hr, Intravenous, PRN  Allergies:  Bactrim; Mupirocin; Neomycin-bacitracin zn-polymyx; Polysporin [bacitracin-polymyxin b]; Silver; Sulfa antibiotics; Tape [adhesive tape];  Wound dressings; and Ancef [cefazolin sodium]    Social History:    Social History     Socioeconomic History    Marital status:      Spouse name: Not on file    Number of children: 2    Years of education: Not on file    Highest education level: Not on file   Occupational History    Occupation: retired   Social Needs    Financial resource strain: Not on file    Food insecurity     Worry: Not on file     Inability: Not on file   Hollie Simpson Transportation needs     Medical: Not on file     Non-medical: Not on file   Tobacco Use    Smoking status: Never Smoker    Smokeless tobacco: Never Used   Substance and Sexual Activity    Alcohol use: No    Drug use: No    Sexual activity: Not Currently   Lifestyle    Physical activity     Days per week: Not on file     Minutes per session: Not on file    Stress: Not on file   Relationships    Social connections     Talks on phone: Not on file     Gets together: Not on file     Attends [employer] service: Not on file     Active member of club or organization: Not on file     Attends meetings of clubs or organizations: Not on file     Relationship status: Not on file    Intimate partner violence     Fear of current or ex partner: Not on file     Emotionally abused: Not on file     Physically abused: Not on file     Forced sexual activity: Not on file   Other Topics Concern    Not on file   Social History Narrative    Not on file       Family History:       Problem Relation Age of Onset    Cancer Other     Diabetes Other     Stroke Other      REVIEW OF SYSTEMS:    CONSTITUTIONAL:  negative  EYES:  negative  HEENT:  negative  RESPIRATORY:  negative  CARDIOVASCULAR:  negative  GASTROINTESTINAL:  negative  INTEGUMENT/BREAST:  negative  HEMATOLOGIC/LYMPHATIC:  negative  ENDOCRINE:  negative  MUSCULOSKELETAL:  negative  NEUROLOGICAL:  negative  PHYSICAL EXAM:    General Appearance: alert and oriented to person, place and time, well developed and well- nourished, in no acute distress  Skin: warm and dry, no rash or erythema  Head: normocephalic and atraumatic  Eyes: pupils equal, round, and reactive to light, extraocular eye movements intact, conjunctivae normal  ENT: tympanic membrane, external ear and ear canal normal bilaterally, nose without deformity, nasal mucosa and turbinates normal without polyps  Neck: supple and non-tender without mass, no thyromegaly or thyroid nodules, no cervical No indication for repeat colonoscopy since his colonoscopy in May of this year. He should follow up with Dr Lozano in the office.     Electronically signed by Loida Love MD on 11/28/2020 at 1:20 PM

## 2020-11-28 NOTE — PLAN OF CARE
Problem: Pain:  Goal: Pain level will decrease  Description: Pain level will decrease  11/28/2020 0416 by Naomi Chowdhury RN  Outcome: Ongoing  Note: Pt denies pain/discomfort during shift. Pt has peripheral neuropathy, but states that gabapentin keeps it under control. Pt is resting quietly in bed with eyes closed. Will continue to assess. Problem: Bowel Function - Altered:  Goal: Bowel elimination is within specified parameters  Description: Bowel elimination is within specified parameters  Outcome: Ongoing  Note: Pt is still having bloody stools (bloody with some soft/loose stool present). Pt is NPO for GI consult. Bowel sounds present x 4. Pt denies abd pain, nausea. Will continue to monitor. Problem: Fluid Volume - Imbalance:  Goal: Will show no signs and symptoms of excessive bleeding  Description: Will show no signs and symptoms of excessive bleeding  Outcome: Ongoing  Note: VSS ~ NSR on tele. IVF infusing at 75/hr. Hgb stable at 9.2, H&H done q 6. Pt denies dizziness. Will continue to monitor.

## 2020-11-29 VITALS
HEIGHT: 68 IN | TEMPERATURE: 98.3 F | RESPIRATION RATE: 18 BRPM | HEART RATE: 66 BPM | WEIGHT: 210.76 LBS | BODY MASS INDEX: 31.94 KG/M2 | OXYGEN SATURATION: 94 % | SYSTOLIC BLOOD PRESSURE: 147 MMHG | DIASTOLIC BLOOD PRESSURE: 62 MMHG

## 2020-11-29 PROBLEM — D50.0 ANEMIA, BLOOD LOSS: Status: ACTIVE | Noted: 2020-11-29

## 2020-11-29 LAB
ALBUMIN SERPL-MCNC: 3.9 G/DL (ref 3.4–5)
ANION GAP SERPL CALCULATED.3IONS-SCNC: 7 MMOL/L (ref 3–16)
BUN BLDV-MCNC: 13 MG/DL (ref 7–20)
CALCIUM SERPL-MCNC: 9.4 MG/DL (ref 8.3–10.6)
CHLORIDE BLD-SCNC: 104 MMOL/L (ref 99–110)
CO2: 32 MMOL/L (ref 21–32)
CREAT SERPL-MCNC: 1.1 MG/DL (ref 0.8–1.3)
GFR AFRICAN AMERICAN: >60
GFR NON-AFRICAN AMERICAN: >60
GLUCOSE BLD-MCNC: 107 MG/DL (ref 70–99)
HCT VFR BLD CALC: 26.7 % (ref 40.5–52.5)
HCT VFR BLD CALC: 28.1 % (ref 40.5–52.5)
HEMOGLOBIN: 9.1 G/DL (ref 13.5–17.5)
HEMOGLOBIN: 9.6 G/DL (ref 13.5–17.5)
MAGNESIUM: 2.1 MG/DL (ref 1.8–2.4)
PHOSPHORUS: 3.4 MG/DL (ref 2.5–4.9)
POTASSIUM SERPL-SCNC: 4.2 MMOL/L (ref 3.5–5.1)
SODIUM BLD-SCNC: 143 MMOL/L (ref 136–145)

## 2020-11-29 PROCEDURE — 83735 ASSAY OF MAGNESIUM: CPT

## 2020-11-29 PROCEDURE — 80069 RENAL FUNCTION PANEL: CPT

## 2020-11-29 PROCEDURE — 85014 HEMATOCRIT: CPT

## 2020-11-29 PROCEDURE — 6370000000 HC RX 637 (ALT 250 FOR IP): Performed by: INTERNAL MEDICINE

## 2020-11-29 PROCEDURE — G0378 HOSPITAL OBSERVATION PER HR: HCPCS

## 2020-11-29 PROCEDURE — 85018 HEMOGLOBIN: CPT

## 2020-11-29 PROCEDURE — 36415 COLL VENOUS BLD VENIPUNCTURE: CPT

## 2020-11-29 PROCEDURE — 2580000003 HC RX 258: Performed by: INTERNAL MEDICINE

## 2020-11-29 RX ORDER — POLYETHYLENE GLYCOL 3350 17 G/17G
17 POWDER, FOR SOLUTION ORAL DAILY PRN
Qty: 527 G | Refills: 1 | Status: SHIPPED | OUTPATIENT
Start: 2020-11-29 | End: 2020-12-29

## 2020-11-29 RX ORDER — FERROUS SULFATE 325(65) MG
325 TABLET ORAL
Qty: 90 TABLET | Refills: 0 | Status: SHIPPED | OUTPATIENT
Start: 2020-11-29

## 2020-11-29 RX ADMIN — FINASTERIDE 5 MG: 5 TABLET, FILM COATED ORAL at 09:41

## 2020-11-29 RX ADMIN — GABAPENTIN 600 MG: 300 CAPSULE ORAL at 09:43

## 2020-11-29 RX ADMIN — EZETIMIBE 10 MG: 10 TABLET ORAL at 09:43

## 2020-11-29 RX ADMIN — ISOSORBIDE MONONITRATE 30 MG: 30 TABLET, EXTENDED RELEASE ORAL at 09:42

## 2020-11-29 RX ADMIN — Medication 10 ML: at 09:43

## 2020-11-29 RX ADMIN — LOSARTAN POTASSIUM AND HYDROCHLOROTHIAZIDE 1 TABLET: 100; 12.5 TABLET, FILM COATED ORAL at 09:41

## 2020-11-29 RX ADMIN — LEVOTHYROXINE SODIUM 112 MCG: 112 TABLET ORAL at 06:21

## 2020-11-29 ASSESSMENT — PAIN SCALES - GENERAL
PAINLEVEL_OUTOF10: 0
PAINLEVEL_OUTOF10: 0

## 2020-11-29 NOTE — PROGRESS NOTES
Report called to [address] [place] [address]. All questions answered. Patient to be transported by wheelchair to [facility]. Yes

## 2020-11-29 NOTE — PROGRESS NOTES
Patient alert and oriented times 4 with stable VS. The patient is UAL with a steady gait. No complaints of pain and negative for BM this shift. H&H is stable. Will continue to monitor.

## 2020-11-29 NOTE — PLAN OF CARE
Problem: Falls - Risk of:  Goal: Will remain free from falls  Outcome: Ongoing   Patient is free from falls, patient w steady gait, bed in lowest position, bed wheels locked, call light and bed table within reach, bathroom checked q2hrs and PRN. Chair wheels locked. Patient is educated to call for assistance if need it.  Will continue to monitor

## 2020-11-29 NOTE — PROGRESS NOTES
Pt ao4, vss, denied any pain, n/v, sob and no bm overnight. Pt needs are met. Pt calls out appropriately for assistance.  Will continue to monitor

## 2020-11-29 NOTE — PROGRESS NOTES
4 Eyes Admission Assessment     I agree as the admission nurse that 2 RN's have performed a thorough Head to Toe Skin Assessment on the patient. ALL assessment sites listed below have been assessed on admission.        Areas assessed by both nurses:   [x]   Head, Face, and Ears   [x]   Shoulders, Back, and Chest  [x]   Arms, Elbows, and Hands   [x]   Coccyx, Sacrum, and Ischium  [x]   Legs, Feet, and Heels        Does the Patient have Skin Breakdown?  no        Master Prevention initiated:  no  Wound Care Orders initiated: no      [facility] nurse consulted for Pressure Injury (Stage 3,4, Unstageable, DTI, NWPT, and Complex wounds) or Master score 18 or lower: no      Nurse 1 eSignature:  Electronically signed by Mandie Lopes RN on 11/29/2020 at 12:18 AM      Nurse 2 eSignature: Electronically signed by Kimberly Valente RN on 11/29/20 at 4:23 AM EST

## 2020-11-29 NOTE — PROGRESS NOTES
Physical Therapy/Occupational therapy  Discharge    Orders received, chart reviewed. Spoke with pt who reports his mobility is \"normal.\"  Pt specifically denies concerns with balance, ambulation, and self care. Politely declines therapy services. Spoke with RN who confirms that pt is up ad halie in room. Will sign off 2* not acute therapy needs.     Chris Dunham, PT, DPT  [phone]  Rolanda Cortez, OTR/L, [phone]

## 2020-11-29 NOTE — DISCHARGE SUMMARY
Hospital Medicine Discharge Summary      Patient ID: Jessi Overton      Patient's PCP: Penny Chester MD    Admit Date: 11/27/2020     Discharge Date: 11/29/2020      Admitting Physician: Barb Franz MD    Discharge Physician: Barb Franz MD     Discharge Diagnoses: Active Hospital Problems    Diagnosis Date Noted    Anemia, blood loss [D50.0] 11/29/2020    GI bleed [K92.2] 11/27/2020         The patient was seen and examined on day of discharge and this discharge summary is in conjunction with any daily progress note from day of discharge. Hospital Course: The patient is an 79 y.o. male with medical history of CAD, HTN, DM and known diverticulosis, who presented to [facility] with blood pre rectum. Takes low dose aspirin at home. Previously had lower diverticular GI bleed in May 2020. During acute event he was treated conservatively and had colonoscopy 1 week after discharge with removal of one polyp. This is his first recurrence since May 2020. Patient was placed on bowel rest and GI was consulted. Patient did not have significant bleeding after admission. Hgb drifted down to 9 from 12 (baseline) and remained stable at 9 for 24 hours. Diet was advanced and patient tolerated it well without bleeding and stable Hgb. Advised to hold his aspirin and follow up with GI as outpatient after discharge. IV iron was given for anemia. Patient did not require transfusion.        Consults:     IP CONSULT TO GI  IP CONSULT TO PRIMARY CARE PROVIDER  IP CONSULT TO HOSPITALIST  IP CONSULT TO PHARMACY  IP CONSULT TO PHARMACY  IP CONSULT TO GI    Disposition: Home     Discharged Condition: Stable    Code Status: Full Code    Activity: activity as tolerated and no driving while on analgesics    Diet: cardiac diet and diabetic diet    Follow Up: Primary Care Physician in one week    Exam:     General appearance: No apparent distress, appears stated age and cooperative.   Lungs: Clear to ascultation, bilaterally without Rales/Wheezes/Rhonchi with good respiratory effort. Heart: Regular rate and rhythm with Normal S1/S2 without  murmurs, rubs or gallops, point of maximum impulse non-displaced  Abdomen: Soft, non-tender or non-distended without rigidity or guarding and positive bowel sounds all four quadrants. Extremities: No clubbing, cyanosis, or edema bilaterally. Skin: Skin color, texture, turgor normal.   Neurologic: Alert and oriented X 3,   grossly non-focal.  Mental status: Alert, oriented, thought content appropriate      Labs: For convenience and continuity at follow-up the following most recent labs are provided:    CBC:   Lab Results   Component Value Date    WBC 7.3 11/27/2020    HGB 9.6 11/29/2020    HCT 28.1 11/29/2020     11/27/2020       RENAL:   Lab Results   Component Value Date     11/29/2020    K 4.2 11/29/2020    K 4.3 11/28/2020     11/29/2020    CO2 32 11/29/2020    BUN 13 11/29/2020    CREATININE 1.1 11/29/2020           Discharge Medications:   Discharge Medication List as of 11/29/2020 12:05 PM           Details   ferrous sulfate (IRON 325) 325 (65 Fe) MG tablet Take 1 tablet by mouth daily (with breakfast), Disp-90 tablet,R-0Print      polyethylene glycol (GLYCOLAX) 17 g packet Take 17 g by mouth daily as needed for Constipation, Disp-527 g,R-1Print              Details   finasteride (PROSCAR) 5 MG tablet Take 5 mg by mouth nightly Historical Med      isosorbide mononitrate (IMDUR) 30 MG extended release tablet Take 30 mg by mouth dailyHistorical Med      blood glucose test strips (ASCENSIA AUTODISC VI;ONE TOUCH ULTRA TEST VI) strip Historical Med      B-D ULTRAFINE III SHORT PEN 31G X 8 MM MISC Starting Wed 2/20/2019, SENG, Historical Med      gabapentin (NEURONTIN) 300 MG capsule Take 600 mg by mouth 3 times daily.  Historical Med      levothyroxine (SYNTHROID) 112 MCG tablet Take 112 mcg by mouth DailyHistorical Med      insulin regular human (HUMULIN R U-500) 500 UNIT/ML concentrated injection vial Inject into the skin Use with insulin pumpHistorical Med      ezetimibe (ZETIA) 10 MG tablet Take 10 mg by mouth dailyHistorical Med      losartan-hydrochlorothiazide (HYZAAR) 100-12.5 MG per tablet Take 1 tablet by mouth dailyHistorical Med      INSULIN INFUSION PUMP by Does not apply route Uses U-500 insulinHistorical Med      furosemide (LASIX) 20 MG tablet Take 20 mg by mouth daily. Historical Med      tramadol (ULTRAM) 50 MG tablet Take 50 mg by mouth every 6 hours as needed. Historical Med                Time Spent on discharge is more than 30 minutes in the examination, evaluation, counseling and review of medications and discharge plan. Signed:  John Richards MD   11/29/2020      Thank you Delmer Montez MD for the opportunity to be involved in this patient's care. If you have any questions or concerns please feel free to contact me at [phone].

## 2021-04-08 ENCOUNTER — NURSE ONLY (OUTPATIENT)
Dept: PRIMARY CARE CLINIC | Age: 81
End: 2021-04-08
Payer: MEDICARE

## 2021-04-08 DIAGNOSIS — Z01.818 PREOP EXAMINATION: Primary | ICD-10-CM

## 2021-04-08 PROCEDURE — 99211 OFF/OP EST MAY X REQ PHY/QHP: CPT | Performed by: NURSE PRACTITIONER

## 2021-04-08 NOTE — PROGRESS NOTES
ENDOSCOPY PREOP PHONE INTERVIEW  INSTRUCTIONS:   Covid test was 4-8-21. Please continue to quarantine until your procedure    Follow all instructions / preps given to you from your doctor's office. If you have not received these instructions yet, please call the office immediately.  Enter the MAIN entrance located on [address] and report to the desk on left side of the lobby. Arrival Time: 1000 for your procedure scheduled at: [facility]   Bring your insurance & photo ID with you.  Dress comfortably. No lotion, powders or jewelry   Bring an accurate list of your medications with doses/ frequency with you day of the procedure, including over the counter medications. If you are taking blood thinners, ASA or diabetic medication, make sure to call Dr. Beck Marie  or your PCP for instructions prior to your procedure.  Arrange for someone to be with you and sign you out & drive you home after your procedure.  We are allowing 1 visitor with you in the hospital.    [address] AGE: Please make sure to be able to give a urine sample on arrival      If you have further questions, you may contact your Endoscopist's office or Pre Admission Testing staff at [facility]. 4/8/2021 .12:34 PM

## 2021-04-09 LAB — SARS-COV-2: NOT DETECTED

## 2021-04-14 ENCOUNTER — HOSPITAL ENCOUNTER (OUTPATIENT)
Age: 81
Setting detail: OUTPATIENT SURGERY
Discharge: HOME | End: 2021-04-14
Attending: INTERNAL MEDICINE | Admitting: INTERNAL MEDICINE
Payer: MEDICARE

## 2021-04-14 VITALS
WEIGHT: 200 LBS | OXYGEN SATURATION: 95 % | TEMPERATURE: 96.9 F | DIASTOLIC BLOOD PRESSURE: 57 MMHG | BODY MASS INDEX: 30.31 KG/M2 | HEIGHT: 68 IN | SYSTOLIC BLOOD PRESSURE: 130 MMHG | HEART RATE: 55 BPM | RESPIRATION RATE: 15 BRPM

## 2021-04-14 DIAGNOSIS — R10.9 ABDOMINAL PAIN, UNSPECIFIED ABDOMINAL LOCATION: ICD-10-CM

## 2021-04-14 LAB
GLUCOSE BLD-MCNC: 147 MG/DL (ref 70–99)
GLUCOSE BLD-MCNC: 161 MG/DL (ref 70–99)
PERFORMED ON: ABNORMAL
PERFORMED ON: ABNORMAL

## 2021-04-14 PROCEDURE — 6360000002 HC RX W HCPCS: Performed by: INTERNAL MEDICINE

## 2021-04-14 PROCEDURE — 6370000000 HC RX 637 (ALT 250 FOR IP): Performed by: INTERNAL MEDICINE

## 2021-04-14 PROCEDURE — 3609012400 HC EGD TRANSORAL BIOPSY SINGLE/MULTIPLE: Performed by: INTERNAL MEDICINE

## 2021-04-14 PROCEDURE — 7100000010 HC PHASE II RECOVERY - FIRST 15 MIN: Performed by: INTERNAL MEDICINE

## 2021-04-14 PROCEDURE — 2709999900 HC NON-CHARGEABLE SUPPLY: Performed by: INTERNAL MEDICINE

## 2021-04-14 PROCEDURE — 99152 MOD SED SAME PHYS/QHP 5/>YRS: CPT | Performed by: INTERNAL MEDICINE

## 2021-04-14 PROCEDURE — 88305 TISSUE EXAM BY PATHOLOGIST: CPT

## 2021-04-14 PROCEDURE — 7100000011 HC PHASE II RECOVERY - ADDTL 15 MIN: Performed by: INTERNAL MEDICINE

## 2021-04-14 RX ORDER — SODIUM CHLORIDE 9 MG/ML
INJECTION, SOLUTION INTRAVENOUS CONTINUOUS
Status: DISCONTINUED | OUTPATIENT
Start: 2021-04-14 | End: 2021-04-14 | Stop reason: HOSPADM

## 2021-04-14 RX ORDER — FENTANYL CITRATE 50 UG/ML
INJECTION, SOLUTION INTRAMUSCULAR; INTRAVENOUS PRN
Status: DISCONTINUED | OUTPATIENT
Start: 2021-04-14 | End: 2021-04-14 | Stop reason: ALTCHOICE

## 2021-04-14 RX ORDER — MIDAZOLAM HYDROCHLORIDE 1 MG/ML
INJECTION INTRAMUSCULAR; INTRAVENOUS PRN
Status: DISCONTINUED | OUTPATIENT
Start: 2021-04-14 | End: 2021-04-14 | Stop reason: ALTCHOICE

## 2021-04-14 ASSESSMENT — PAIN SCALES - GENERAL: PAINLEVEL_OUTOF10: 0

## 2021-04-14 ASSESSMENT — PAIN SCALES - WONG BAKER: WONGBAKER_NUMERICALRESPONSE: 0

## 2021-04-14 NOTE — BRIEF OP NOTE
Brief Postoperative Note      Patient: Henry Reza  YOB: 1940  MRN: [id number]    Date of Procedure: 4/14/2021    Pre-Op Diagnosis: Abdominal pain, unspecified abdominal location [R10.9]    Post-Op Diagnosis: Same       Procedure(s):  EGD BIOPSY    Surgeon(s):  Tammy Smith MD    Assistant:  * No surgical staff found *    Anesthesia: IV Sedation    Estimated Blood Loss (mL): Minimal    Complications: None    Specimens:   ID Type Source Tests Collected by Time Destination   A : gastric bx eval gastritis & r/o HP Tissue Colon SURGICAL PATHOLOGY Tammy Smith MD 4/14/2021 1151        Implants:  * No implants in log *      Drains: * No LDAs found *    Findings: gastritis    Electronically signed by Fatuma Mcpherson MD on 4/14/2021 at 11:57 AM

## 2021-04-14 NOTE — H&P
History and Physical / Pre-Sedation Assessment    Jelly Krueger is a 79 y.o. male who presents today for EGD procedure. 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 Authorizing Provider   ferrous sulfate (IRON 325) 325 (65 Fe) MG tablet Take 1 tablet by mouth daily (with breakfast) 11/29/20  Yes Anna Higuera MD   finasteride (PROSCAR) 5 MG tablet Take 5 mg by mouth nightly    Yes Historical Provider, MD   isosorbide mononitrate (IMDUR) 30 MG extended release tablet Take 30 mg by mouth daily   Yes Historical Provider, MD   gabapentin (NEURONTIN) 300 MG capsule Take 600 mg by mouth 3 times daily. Yes Historical Provider, MD   levothyroxine (SYNTHROID) 112 MCG tablet Take 112 mcg by mouth Daily   Yes Historical Provider, MD   insulin regular human (HUMULIN R U-500) 500 UNIT/ML concentrated injection vial Inject into the skin Use with insulin pump   Yes Historical Provider, MD   ezetimibe (ZETIA) 10 MG tablet Take 10 mg by mouth daily   Yes Historical Provider, MD   losartan-hydrochlorothiazide (HYZAAR) 100-12.5 MG per tablet Take 1 tablet by mouth daily   Yes Historical Provider, MD   furosemide (LASIX) 20 MG tablet Take 20 mg by mouth daily. 3/30/11  Yes Historical Provider, MD   tramadol (ULTRAM) 50 MG tablet Take 50 mg by mouth every 6 hours as needed.    Yes Historical Provider, MD   blood glucose test strips (ASCENSIA AUTODISC VI;ONE TOUCH ULTRA TEST VI) strip Test 6 times daily Diagnosis E11.9 2/23/19   Historical Provider, MD MADISON ULTRAFINE III SHORT PEN 31G X 8 MM MISC  2/20/19   Historical Provider, MD   INSULIN INFUSION PUMP by Does not apply route Uses U-500 insulin    Historical Provider, MD       Allergies: Allergies   Allergen Reactions    Bactrim Anaphylaxis    Mupirocin Swelling and Other (See Comments)     Per H&P, caused him not to be able to see  Redness      Neomycin-Bacitracin Zn-Polymyx      Other reaction(s): Vision Problem  THE DROPS    Polysporin [Bacitracin-Polymyxin B] Other (See Comments)     Causes loss vision  When used in eyes redness    Silver     Sulfa Antibiotics Dermatitis    Tape [Adhesive Tape]      Red bumps can use paper tape     Wound Dressings Dermatitis     Skin peeled off with dressing.     Ancef [Cefazolin Sodium] Rash       PSHx:    Past Surgical History:   Procedure Laterality Date    ABDOMEN SURGERY      Bowel surgery where a portion of his bowel was removed    APPENDECTOMY      BACK SURGERY      twice    CARDIAC SURGERY      bypass x4    CATARACT REMOVAL WITH IMPLANT  2011    right eye    CHOLECYSTECTOMY, LAPAROSCOPIC  12/5/2013    w/cholangiogram    COLONOSCOPY N/A 5/6/2019    COLONOSCOPY WITH BIOPSY performed by Sofie Anderson MD at [facility] COLONOSCOPY N/A 5/18/2020    COLONOSCOPY WITH BIOPSY performed by Sofie Anderson MD at [address] [place] [address]  5/10/11    Left ulnar nerve decompression    EYE SURGERY Bilateral 12/20/11    Left Eye Cataract Removal    JOINT REPLACEMENT Left toe metatarsal    SHOULDER ARTHROPLASTY Right     SPINE SURGERY  01/2019    Spinal cord stimulator    TOTAL KNEE ARTHROPLASTY Bilateral     UVULOPALATOPHARYGOPLASTY         Social Hx:    Social History     Socioeconomic History    Marital status:      Spouse name: Not on file    Number of children: 2    Years of education: Not on file    Highest education level: Not on file   Occupational History    Occupation: retired   Social Needs    Financial resource strain: Not on file    Food insecurity     Worry: Not on file     Inability: Not on file    Transportation needs     Medical: Not on file     Non-medical: Not on file   Tobacco Use    Smoking status: Never Smoker    Smokeless tobacco: Never Used   Substance and Sexual Activity    Alcohol use: No    Drug use: No    Sexual activity: Not Currently   Lifestyle    Physical activity     Days per week: Not on file     Minutes per session: Not on file    Stress: Not on file   Relationships    Social connections     Talks on phone: Not on file     Gets together: Not on file     Attends Scientologist service: Not on file     Active member of club or organization: Not on file     Attends meetings of clubs or organizations: Not on file     Relationship status: Not on file    Intimate partner violence     Fear of current or ex partner: Not on file     Emotionally abused: Not on file     Physically abused: Not on file     Forced sexual activity: Not on file   Other Topics Concern    Not on file   Social History Narrative    Not on file       Family Hx:   Family History   Problem Relation Age of Onset    Cancer Other     Diabetes Other     Stroke Other        Physical Exam:  Vital Signs: BP (!) 151/82   Pulse 60   Temp 98 °F (36.7 °C) (Oral)   Resp 16   Ht 5' 8\" (1.727 m)   Wt 200 lb (90.7 kg)   SpO2 97%   BMI 30.41 kg/m²    Pulmonary: Normal  Cardiac: Normal  Abdomen: Normal    Pre-Procedure Assessment / Plan:  ASA Classification: Class 2 - A normal healthy patient with mild systemic disease  Level of Sedation Plan: Moderate sedation   Mallampati Score: I (soft palate, uvula, fauces, tonsillar pillars visible)  Post Procedure plan: Return to same level of care    . I assessed the patient and find that the patient is in satisfactory condition to proceed with the planned procedure and sedation plan. Risks/benefits/alternatives of procedure discussed with patient and any present family members.  Risks including,

## 2021-04-14 NOTE — PROGRESS NOTES
Ambulatory Surgery/Procedure Discharge Note    Vitals:    04/14/21 1225   BP: (!) 130/57   Pulse: 55   Resp: 15   Temp: 96.9 °F (36.1 °C)   SpO2: 95%       No intake/output data recorded. Restroom use offered before discharge. Yes pt refuse bathroom privileges for now. Pt tolerating PO well and denies nausea. Discharge instructions were read to the wife at bedside and verbalize understanding. Pain assessment:  none  Pain Level: 0        Patient discharged to home/self care.  Patient discharged via wheel chair to waiting family/S.O.       4/14/2021 12:57 PM

## 2021-10-07 ENCOUNTER — APPOINTMENT (OUTPATIENT)
Dept: GENERAL RADIOLOGY | Age: 81
DRG: 177 | End: 2021-10-07
Payer: MEDICARE

## 2021-10-07 ENCOUNTER — HOSPITAL ENCOUNTER (INPATIENT)
Age: 81
LOS: 2 days | Discharge: HOME | DRG: 177 | End: 2021-10-09
Attending: EMERGENCY MEDICINE | Admitting: INTERNAL MEDICINE
Payer: MEDICARE

## 2021-10-07 DIAGNOSIS — J96.01 ACUTE RESPIRATORY FAILURE WITH HYPOXIA (HCC): Primary | ICD-10-CM

## 2021-10-07 DIAGNOSIS — U07.1 COVID-19: ICD-10-CM

## 2021-10-07 LAB
A/G RATIO: 1.2 (ref 1.1–2.2)
ALBUMIN SERPL-MCNC: 4.1 G/DL (ref 3.4–5)
ALP BLD-CCNC: 70 U/L (ref 40–129)
ALT SERPL-CCNC: 13 U/L (ref 10–40)
ANION GAP SERPL CALCULATED.3IONS-SCNC: 11 MMOL/L (ref 3–16)
AST SERPL-CCNC: 18 U/L (ref 15–37)
BASE EXCESS VENOUS: 6.1 MMOL/L (ref -2–3)
BASOPHILS ABSOLUTE: 0 K/UL (ref 0–0.2)
BASOPHILS RELATIVE PERCENT: 0.3 %
BILIRUB SERPL-MCNC: 0.7 MG/DL (ref 0–1)
BILIRUBIN URINE: NEGATIVE
BLOOD, URINE: NEGATIVE
BUN BLDV-MCNC: 25 MG/DL (ref 7–20)
C-REACTIVE PROTEIN: 39.1 MG/L (ref 0–5.1)
CALCIUM SERPL-MCNC: 8.8 MG/DL (ref 8.3–10.6)
CARBOXYHEMOGLOBIN: 1.1 % (ref 0–1.5)
CHLORIDE BLD-SCNC: 95 MMOL/L (ref 99–110)
CLARITY: CLEAR
CO2: 31 MMOL/L (ref 21–32)
COLOR: YELLOW
CREAT SERPL-MCNC: 1.5 MG/DL (ref 0.8–1.3)
D DIMER: 383 NG/ML DDU (ref 0–229)
EKG ATRIAL RATE: 83 BPM
EKG DIAGNOSIS: NORMAL
EKG P AXIS: 17 DEGREES
EKG P-R INTERVAL: 136 MS
EKG Q-T INTERVAL: 330 MS
EKG QRS DURATION: 76 MS
EKG QTC CALCULATION (BAZETT): 387 MS
EKG R AXIS: 32 DEGREES
EKG T AXIS: 90 DEGREES
EKG VENTRICULAR RATE: 83 BPM
EOSINOPHILS ABSOLUTE: 0 K/UL (ref 0–0.6)
EOSINOPHILS RELATIVE PERCENT: 0.2 %
GFR AFRICAN AMERICAN: 54
GFR NON-AFRICAN AMERICAN: 45
GLOBULIN: 3.5 G/DL
GLUCOSE BLD-MCNC: 138 MG/DL (ref 70–99)
GLUCOSE BLD-MCNC: 141 MG/DL (ref 70–99)
GLUCOSE BLD-MCNC: 148 MG/DL (ref 70–99)
GLUCOSE URINE: NEGATIVE MG/DL
HCO3 VENOUS: 33.2 MMOL/L (ref 24–28)
HCT VFR BLD CALC: 36.9 % (ref 40.5–52.5)
HEMOGLOBIN, VEN, REDUCED: 8.1 %
HEMOGLOBIN: 12.4 G/DL (ref 13.5–17.5)
KETONES, URINE: NEGATIVE MG/DL
LACTIC ACID, SEPSIS: 1.2 MMOL/L (ref 0.4–1.9)
LACTIC ACID, SEPSIS: 2 MMOL/L (ref 0.4–1.9)
LEUKOCYTE ESTERASE, URINE: NEGATIVE
LYMPHOCYTES ABSOLUTE: 0.8 K/UL (ref 1–5.1)
LYMPHOCYTES RELATIVE PERCENT: 9.8 %
MCH RBC QN AUTO: 29.5 PG (ref 26–34)
MCHC RBC AUTO-ENTMCNC: 33.7 G/DL (ref 31–36)
MCV RBC AUTO: 87.5 FL (ref 80–100)
METHEMOGLOBIN VENOUS: 0.3 % (ref 0–1.5)
MICROSCOPIC EXAMINATION: YES
MONOCYTES ABSOLUTE: 0.5 K/UL (ref 0–1.3)
MONOCYTES RELATIVE PERCENT: 6.9 %
MUCUS: ABNORMAL /LPF
NEUTROPHILS ABSOLUTE: 6.4 K/UL (ref 1.7–7.7)
NEUTROPHILS RELATIVE PERCENT: 82.8 %
NITRITE, URINE: NEGATIVE
O2 SAT, VEN: 92 %
PCO2, VEN: 58.6 MMHG (ref 41–51)
PDW BLD-RTO: 14.1 % (ref 12.4–15.4)
PERFORMED ON: ABNORMAL
PERFORMED ON: ABNORMAL
PH UA: 6.5 (ref 5–8)
PH VENOUS: 7.36 (ref 7.35–7.45)
PLATELET # BLD: 193 K/UL (ref 135–450)
PMV BLD AUTO: 8.4 FL (ref 5–10.5)
PO2, VEN: 66 MMHG (ref 25–40)
POTASSIUM REFLEX MAGNESIUM: 4.5 MMOL/L (ref 3.5–5.1)
PROCALCITONIN: 0.1 NG/ML (ref 0–0.15)
PROTEIN UA: 100 MG/DL
RBC # BLD: 4.22 M/UL (ref 4.2–5.9)
RBC UA: ABNORMAL /HPF (ref 0–4)
SODIUM BLD-SCNC: 137 MMOL/L (ref 136–145)
SPECIFIC GRAVITY UA: 1.02 (ref 1–1.03)
TCO2 CALC VENOUS: 35 MMOL/L
TOTAL PROTEIN: 7.6 G/DL (ref 6.4–8.2)
URINE TYPE: ABNORMAL
UROBILINOGEN, URINE: 0.2 E.U./DL
WBC # BLD: 7.8 K/UL (ref 4–11)
WBC UA: ABNORMAL /HPF (ref 0–5)

## 2021-10-07 PROCEDURE — 2580000003 HC RX 258: Performed by: STUDENT IN AN ORGANIZED HEALTH CARE EDUCATION/TRAINING PROGRAM

## 2021-10-07 PROCEDURE — 99285 EMERGENCY DEPT VISIT HI MDM: CPT

## 2021-10-07 PROCEDURE — 85025 COMPLETE CBC W/AUTO DIFF WBC: CPT

## 2021-10-07 PROCEDURE — 96361 HYDRATE IV INFUSION ADD-ON: CPT

## 2021-10-07 PROCEDURE — 84145 PROCALCITONIN (PCT): CPT

## 2021-10-07 PROCEDURE — 83605 ASSAY OF LACTIC ACID: CPT

## 2021-10-07 PROCEDURE — 85379 FIBRIN DEGRADATION QUANT: CPT

## 2021-10-07 PROCEDURE — G0378 HOSPITAL OBSERVATION PER HR: HCPCS

## 2021-10-07 PROCEDURE — C9113 INJ PANTOPRAZOLE SODIUM, VIA: HCPCS | Performed by: STUDENT IN AN ORGANIZED HEALTH CARE EDUCATION/TRAINING PROGRAM

## 2021-10-07 PROCEDURE — 71045 X-RAY EXAM CHEST 1 VIEW: CPT

## 2021-10-07 PROCEDURE — 96374 THER/PROPH/DIAG INJ IV PUSH: CPT

## 2021-10-07 PROCEDURE — 86140 C-REACTIVE PROTEIN: CPT

## 2021-10-07 PROCEDURE — 96360 HYDRATION IV INFUSION INIT: CPT

## 2021-10-07 PROCEDURE — 6370000000 HC RX 637 (ALT 250 FOR IP): Performed by: STUDENT IN AN ORGANIZED HEALTH CARE EDUCATION/TRAINING PROGRAM

## 2021-10-07 PROCEDURE — 93005 ELECTROCARDIOGRAM TRACING: CPT | Performed by: STUDENT IN AN ORGANIZED HEALTH CARE EDUCATION/TRAINING PROGRAM

## 2021-10-07 PROCEDURE — 82803 BLOOD GASES ANY COMBINATION: CPT

## 2021-10-07 PROCEDURE — 81001 URINALYSIS AUTO W/SCOPE: CPT

## 2021-10-07 PROCEDURE — 96372 THER/PROPH/DIAG INJ SC/IM: CPT

## 2021-10-07 PROCEDURE — 96375 TX/PRO/DX INJ NEW DRUG ADDON: CPT

## 2021-10-07 PROCEDURE — 80053 COMPREHEN METABOLIC PANEL: CPT

## 2021-10-07 PROCEDURE — 2060000000 HC ICU INTERMEDIATE R&B

## 2021-10-07 PROCEDURE — 36415 COLL VENOUS BLD VENIPUNCTURE: CPT

## 2021-10-07 PROCEDURE — 6360000002 HC RX W HCPCS: Performed by: STUDENT IN AN ORGANIZED HEALTH CARE EDUCATION/TRAINING PROGRAM

## 2021-10-07 RX ORDER — LEVOTHYROXINE SODIUM 137 UG/1
137 TABLET ORAL DAILY
COMMUNITY
Start: 2021-09-21

## 2021-10-07 RX ORDER — ACETAMINOPHEN 325 MG/1
650 TABLET ORAL ONCE
Status: COMPLETED | OUTPATIENT
Start: 2021-10-07 | End: 2021-10-07

## 2021-10-07 RX ORDER — SODIUM CHLORIDE 0.9 % (FLUSH) 0.9 %
5-40 SYRINGE (ML) INJECTION PRN
Status: DISCONTINUED | OUTPATIENT
Start: 2021-10-07 | End: 2021-10-09 | Stop reason: HOSPADM

## 2021-10-07 RX ORDER — PANTOPRAZOLE SODIUM 40 MG/10ML
40 INJECTION, POWDER, LYOPHILIZED, FOR SOLUTION INTRAVENOUS DAILY
Status: DISCONTINUED | OUTPATIENT
Start: 2021-10-07 | End: 2021-10-09 | Stop reason: HOSPADM

## 2021-10-07 RX ORDER — LEVOTHYROXINE SODIUM 112 UG/1
112 TABLET ORAL DAILY
Status: DISCONTINUED | OUTPATIENT
Start: 2021-10-08 | End: 2021-10-08

## 2021-10-07 RX ORDER — DEXTROSE MONOHYDRATE 25 G/50ML
12.5 INJECTION, SOLUTION INTRAVENOUS PRN
Status: DISCONTINUED | OUTPATIENT
Start: 2021-10-07 | End: 2021-10-09 | Stop reason: HOSPADM

## 2021-10-07 RX ORDER — TRIAMCINOLONE ACETONIDE 1 MG/G
CREAM TOPICAL PRN
COMMUNITY
Start: 2021-09-21

## 2021-10-07 RX ORDER — POLYETHYLENE GLYCOL 3350 17 G/17G
17 POWDER, FOR SOLUTION ORAL DAILY PRN
Status: DISCONTINUED | OUTPATIENT
Start: 2021-10-07 | End: 2021-10-09 | Stop reason: HOSPADM

## 2021-10-07 RX ORDER — ACETAMINOPHEN 650 MG/1
650 SUPPOSITORY RECTAL EVERY 6 HOURS PRN
Status: DISCONTINUED | OUTPATIENT
Start: 2021-10-07 | End: 2021-10-09 | Stop reason: HOSPADM

## 2021-10-07 RX ORDER — FINASTERIDE 5 MG/1
5 TABLET, FILM COATED ORAL NIGHTLY
Status: DISCONTINUED | OUTPATIENT
Start: 2021-10-07 | End: 2021-10-09 | Stop reason: HOSPADM

## 2021-10-07 RX ORDER — ONDANSETRON 2 MG/ML
4 INJECTION INTRAMUSCULAR; INTRAVENOUS EVERY 6 HOURS PRN
Status: DISCONTINUED | OUTPATIENT
Start: 2021-10-07 | End: 2021-10-09 | Stop reason: HOSPADM

## 2021-10-07 RX ORDER — DEXTROSE MONOHYDRATE 50 MG/ML
100 INJECTION, SOLUTION INTRAVENOUS PRN
Status: DISCONTINUED | OUTPATIENT
Start: 2021-10-07 | End: 2021-10-09 | Stop reason: HOSPADM

## 2021-10-07 RX ORDER — ACETAMINOPHEN 325 MG/1
650 TABLET ORAL EVERY 6 HOURS PRN
Status: DISCONTINUED | OUTPATIENT
Start: 2021-10-07 | End: 2021-10-09 | Stop reason: HOSPADM

## 2021-10-07 RX ORDER — SODIUM CHLORIDE 9 MG/ML
25 INJECTION, SOLUTION INTRAVENOUS PRN
Status: DISCONTINUED | OUTPATIENT
Start: 2021-10-07 | End: 2021-10-09 | Stop reason: HOSPADM

## 2021-10-07 RX ORDER — 0.9 % SODIUM CHLORIDE 0.9 %
500 INTRAVENOUS SOLUTION INTRAVENOUS ONCE
Status: COMPLETED | OUTPATIENT
Start: 2021-10-07 | End: 2021-10-07

## 2021-10-07 RX ORDER — SODIUM CHLORIDE 0.9 % (FLUSH) 0.9 %
5-40 SYRINGE (ML) INJECTION EVERY 12 HOURS SCHEDULED
Status: DISCONTINUED | OUTPATIENT
Start: 2021-10-08 | End: 2021-10-09 | Stop reason: HOSPADM

## 2021-10-07 RX ORDER — ISOSORBIDE MONONITRATE 30 MG/1
30 TABLET, EXTENDED RELEASE ORAL DAILY
Status: DISCONTINUED | OUTPATIENT
Start: 2021-10-07 | End: 2021-10-09 | Stop reason: HOSPADM

## 2021-10-07 RX ORDER — NICOTINE POLACRILEX 4 MG
15 LOZENGE BUCCAL PRN
Status: DISCONTINUED | OUTPATIENT
Start: 2021-10-07 | End: 2021-10-09 | Stop reason: HOSPADM

## 2021-10-07 RX ORDER — EZETIMIBE 10 MG/1
10 TABLET ORAL DAILY
Status: DISCONTINUED | OUTPATIENT
Start: 2021-10-07 | End: 2021-10-09 | Stop reason: HOSPADM

## 2021-10-07 RX ORDER — ONDANSETRON 4 MG/1
4 TABLET, ORALLY DISINTEGRATING ORAL EVERY 8 HOURS PRN
Status: DISCONTINUED | OUTPATIENT
Start: 2021-10-07 | End: 2021-10-09 | Stop reason: HOSPADM

## 2021-10-07 RX ORDER — DEXAMETHASONE SODIUM PHOSPHATE 4 MG/ML
6 INJECTION, SOLUTION INTRA-ARTICULAR; INTRALESIONAL; INTRAMUSCULAR; INTRAVENOUS; SOFT TISSUE DAILY
Status: DISCONTINUED | OUTPATIENT
Start: 2021-10-07 | End: 2021-10-09 | Stop reason: HOSPADM

## 2021-10-07 RX ADMIN — PANTOPRAZOLE SODIUM 40 MG: 40 INJECTION, POWDER, FOR SOLUTION INTRAVENOUS at 21:16

## 2021-10-07 RX ADMIN — ACETAMINOPHEN 650 MG: 325 TABLET ORAL at 17:17

## 2021-10-07 RX ADMIN — DEXAMETHASONE SODIUM PHOSPHATE 6 MG: 4 INJECTION, SOLUTION INTRAMUSCULAR; INTRAVENOUS at 21:16

## 2021-10-07 RX ADMIN — SODIUM CHLORIDE 500 ML: 9 INJECTION, SOLUTION INTRAVENOUS at 20:10

## 2021-10-07 RX ADMIN — SODIUM CHLORIDE 500 ML: 9 INJECTION, SOLUTION INTRAVENOUS at 18:13

## 2021-10-07 ASSESSMENT — PAIN SCALES - GENERAL
PAINLEVEL_OUTOF10: 0
PAINLEVEL_OUTOF10: 0

## 2021-10-07 NOTE — ED PROVIDER NOTES
ED Attending Attestation Note     Date of evaluation: 10/7/2021    This patient was seen by the resident. I have seen and examined the patient, agree with the workup, evaluation, management and diagnosis. The care plan has been discussed. I have reviewed the ECG and concur with the resident's interpretation. I was present for any procedures performed in the resident's  note and have made edits to the note where appropriate. My assessment reveals 79 y.o. male with history of insulin-dependent diabetes on an insulin pump known Covid, presenting for lightheadedness, weakness. On my examination he is alert, no distress, vital signs reassuring. Heart regular rate and rhythm, lungs clear to auscultation, abdomen soft and benign, insulin pump site clean and without erythema. However he does desaturate with ambulation and his overall symptoms are concerning for worsening Covid symptoms. He has also had some lability in his blood sugars and has a mild JONO.          Jose Olivier MD  10/07/21 1944

## 2021-10-07 NOTE — ED TRIAGE NOTES
Pt arrived to ED with dizziness and confusion. States he blood sugar was low but unable to report what home reading was.  Blood sugar 148 upon arrival. Fever at time of arrival of 102.4. diagnosed with COVID earlier this week

## 2021-10-07 NOTE — ED NOTES
Bed: 6-26  Expected date:   Expected time:   Means of arrival:   Comments:  Medic Unit 80 YO Dizziness and + Covid Monday VSLILY Storey RN  10/07/21 9802

## 2021-10-07 NOTE — ED PROVIDER NOTES
[address] RESIDENT NOTE       Date of evaluation: 10/7/2021    Chief Complaint     Dizziness and Altered Mental Status      History of Present Illness     Roldan Sarkar is a 79 y.o. male who presents with dizziness. Patient states he woke up around 5 AM and started feeling dizzy. Initially, he contributed to his blood sugar levels, which presents similarly to his current symptoms. Patient has a history of hypoglycemic episodes. Patient has an insulin pump and Dexacom monitor. Patient states he monitored his blood sugars until 1 PM and noticed it has not changed from 140-160. Patient decided to take his temperature which he recorded to be 102.3F. Patient states he has been eating and drinking well. Patient had his COVID vaccination, however he was tested positive on Monday. He denies any shortness of breath, chills, myalgias, diarrhea, leg tenderness/swelling, ageusia, or anosmia. Review of Systems     Review of Systems   All other systems reviewed and are negative. Past Medical, Surgical, Family, and Social History     He has a past medical history of Anesthesia, Arthritis, Blood transfusion, CAD (coronary artery disease), Cancer ([facility] [address] Cataract, Diabetes, Heart disease, High blood pressure, Hx of blood clots, Sleep apnea, Thyroid disease, and Vascular disease. He has a past surgical history that includes back surgery; Elbow surgery (5/10/11); Cardiac surgery; Abdomen surgery; Appendectomy; Uvulopalatopharygoplasty; Cataract removal with implant (2011); eye surgery (Bilateral, 12/20/11); joint replacement (Left, toe metatarsal); Cholecystectomy, laparoscopic (12/5/2013); Total shoulder arthroplasty (Right); Total knee arthroplasty (Bilateral); Spine surgery (01/2019); Colonoscopy (N/A, 5/6/2019); Colonoscopy (N/A, 5/18/2020); and Upper gastrointestinal endoscopy (N/A, 4/14/2021).   His family history includes Cancer in an other family member; Diabetes in an other family member; Stroke in an other family member. He reports that he has never smoked. He has never used smokeless tobacco. He reports that he does not drink alcohol and does not use drugs. Medications     Previous Medications    B-D ULTRAFINE III SHORT PEN 31G X 8 MM MISC        BLOOD GLUCOSE TEST STRIPS (ASCENSIA AUTODISC VI;ONE TOUCH ULTRA TEST VI) STRIP    Test 6 times daily Diagnosis E11.9    EZETIMIBE (ZETIA) 10 MG TABLET    Take 10 mg by mouth daily    FERROUS SULFATE (IRON 325) 325 (65 FE) MG TABLET    Take 1 tablet by mouth daily (with breakfast)    FINASTERIDE (PROSCAR) 5 MG TABLET    Take 5 mg by mouth nightly     FUROSEMIDE (LASIX) 20 MG TABLET    Take 20 mg by mouth daily. GABAPENTIN (NEURONTIN) 300 MG CAPSULE    Take 600 mg by mouth 3 times daily. INSULIN INFUSION PUMP    by Does not apply route Uses U-500 insulin    INSULIN REGULAR HUMAN (HUMULIN R U-500) 500 UNIT/ML CONCENTRATED INJECTION VIAL    Inject into the skin Use with insulin pump    ISOSORBIDE MONONITRATE (IMDUR) 30 MG EXTENDED RELEASE TABLET    Take 30 mg by mouth daily    LEVOTHYROXINE (SYNTHROID) 112 MCG TABLET    Take 112 mcg by mouth Daily    LOSARTAN-HYDROCHLOROTHIAZIDE (HYZAAR) 100-12.5 MG PER TABLET    Take 1 tablet by mouth daily    TRAMADOL (ULTRAM) 50 MG TABLET    Take 50 mg by mouth every 6 hours as needed. Allergies     He is allergic to bactrim, mupirocin, neomycin-bacitracin zn-polymyx, polysporin [bacitracin-polymyxin b], silver, sulfa antibiotics, tape [adhesive tape], wound dressings, and ancef [cefazolin sodium]. Physical Exam     INITIAL VITALS: BP: (!) 166/61, Temp: 102.4 °F (39.1 °C), Pulse: 84, Resp: 12, SpO2: 92 %   Physical Exam  Constitutional:       Appearance: He is well-developed. He is obese. HENT:      Head: Normocephalic and atraumatic. Cardiovascular:      Rate and Rhythm: Normal rate and regular rhythm. Heart sounds: Normal heart sounds.    Pulmonary: Effort: Pulmonary effort is normal.      Breath sounds: Normal breath sounds. Abdominal:      General: Bowel sounds are normal.      Palpations: Abdomen is soft. Musculoskeletal:         General: Normal range of motion. Cervical back: Normal range of motion and neck supple. Skin:     General: Skin is warm and dry. Capillary Refill: Capillary refill takes less than 2 seconds. Neurological:      Mental Status: He is alert and oriented to person, place, and time. Diagnostic Results     EKG   Interpreted inconjunction with emergency department physician Elena Boothe MD  Rhythm: normal sinus   Rate: normal  Axis: normal  Ectopy: none  Conduction: normal  ST Segments: normal  T Waves: normal  Q Waves: none  Clinical Impression: no acute changes    RADIOLOGY:  XR CHEST PORTABLE   Final Result      No evidence of acute cardiopulmonary disease.                 LABS:   Results for orders placed or performed during the hospital encounter of 10/07/21   CBC Auto Differential   Result Value Ref Range    WBC 7.8 4.0 - 11.0 K/uL    RBC 4.22 4.20 - 5.90 M/uL    Hemoglobin 12.4 (L) 13.5 - 17.5 g/dL    Hematocrit 36.9 (L) 40.5 - 52.5 %    MCV 87.5 80.0 - 100.0 fL    MCH 29.5 26.0 - 34.0 pg    MCHC 33.7 31.0 - 36.0 g/dL    RDW 14.1 12.4 - 15.4 %    Platelets 543 421 - 903 K/uL    MPV 8.4 5.0 - 10.5 fL    Neutrophils % 82.8 %    Lymphocytes % 9.8 %    Monocytes % 6.9 %    Eosinophils % 0.2 %    Basophils % 0.3 %    Neutrophils Absolute 6.4 1.7 - 7.7 K/uL    Lymphocytes Absolute 0.8 (L) 1.0 - 5.1 K/uL    Monocytes Absolute 0.5 0.0 - 1.3 K/uL    Eosinophils Absolute 0.0 0.0 - 0.6 K/uL    Basophils Absolute 0.0 0.0 - 0.2 K/uL   Comprehensive Metabolic Panel w/ Reflex to MG   Result Value Ref Range    Sodium 137 136 - 145 mmol/L    Potassium reflex Magnesium 4.5 3.5 - 5.1 mmol/L    Chloride 95 (L) 99 - 110 mmol/L    CO2 31 21 - 32 mmol/L    Anion Gap 11 3 - 16    Glucose 141 (H) 70 - 99 mg/dL    BUN 25 (H) 7 - 20 mg/dL    CREATININE 1.5 (H) 0.8 - 1.3 mg/dL    GFR Non-African American 45 (A) >60    GFR  54 (A) >60    Calcium 8.8 8.3 - 10.6 mg/dL    Total Protein 7.6 6.4 - 8.2 g/dL    Albumin 4.1 3.4 - 5.0 g/dL    Albumin/Globulin Ratio 1.2 1.1 - 2.2    Total Bilirubin 0.7 0.0 - 1.0 mg/dL    Alkaline Phosphatase 70 40 - 129 U/L    ALT 13 10 - 40 U/L    AST 18 15 - 37 U/L    Globulin 3.5 g/dL   Lactate, Sepsis   Result Value Ref Range    Lactic Acid, Sepsis 2.0 (H) 0.4 - 1.9 mmol/L   Blood Gas, Venous   Result Value Ref Range    pH, Alvino 7.361 7.350 - 7.450    pCO2, Alvino 58.6 (H) 41.0 - 51.0 mmHg    pO2, Alvino 66.0 (H) 25 - 40 mmHg    HCO3, Venous 33.2 (H) 24.0 - 28.0 mmol/L    Base Excess, Alvino 6.1 (H) -2.0 - 3.0 mmol/L    O2 Sat, Alvino 92 Not established %    Carboxyhemoglobin 1.1 0.0 - 1.5 %    MetHgb, Alvino 0.3 0.0 - 1.5 %    TC02 (Calc), Alvino 35 mmol/L    Hemoglobin, Alvino, Reduced 8.10 %   POCT Glucose   Result Value Ref Range    POC Glucose 148 (H) 70 - 99 mg/dl    Performed on ACCU-CHEK    POCT Glucose   Result Value Ref Range    POC Glucose 138 (H) 70 - 99 mg/dl    Performed on ACCU-CHEK    EKG 12 Lead   Result Value Ref Range    Ventricular Rate 83 BPM    Atrial Rate 83 BPM    P-R Interval 136 ms    QRS Duration 76 ms    Q-T Interval 330 ms    QTc Calculation (Bazett) 387 ms    P Axis 17 degrees    R Axis 32 degrees    T Axis 90 degrees    Diagnosis       EKG performed in ER and to be interpreted by ER physician. Confirmed by MD, ER ([id number]),  Hao Jurado ([phone]) on 10/7/2021 6:16:33 PM       ED BEDSIDE ULTRASOUND:  None performed. RECENT VITALS:  BP: 131/64, Temp: 99.1 °F (37.3 °C),Pulse: 68, Resp: 15, SpO2: 99 %     Procedures     None performed. ED Course     Nursing Notes, Past Medical Hx, Past Surgical Hx, Social Hx, Allergies, and FamilyHx were reviewed.     The patient was giventhe following medications:  Orders Placed This Encounter   Medications    acetaminophen (TYLENOL) tablet 650 mg    0.9 % sodium chloride bolus    0.9 % sodium chloride bolus       CONSULTS:  IP CONSULT TO HOSPITALIST    MEDICAL DECISION MAKING / ASSESSMENT / Yolanda FPC is a 79 y.o. male presents with dizziness. Upon initial presentation, patient was hemodynamically stable, febrile at 102.4, saturating at 92% on room air and in not acute distress. Cardiac, pulmonary, abdominal exam were unremarkable. No lower extremity edema/tenderness noted. Lab work-up was significant for elevated creatinine (1.5), lactic acidosis (2.0), hyperglycemia (141), no elevated white count, and VBG showed no acidosis. Chest x-ray was negative for any cardiopulmonary abnormalities. Patient was given a total of 1 L of normal saline for fluids. Patient was also given acetaminophen for his fever. Patient was placed on 2 L nasal cannula which brought his saturations up to 98%. Upon ambulation with pulse ox on room air, patient's oxygen saturation decreased to 88%, although he denied any dizziness or shortness of breath. Patient will be admitted for acute hypoxic respiratory failure secondary to Covid infection and for acute kidney injury secondary to dehydration. This patient was also evaluated by the attending physician. All care plans were discussed and agreed upon. Clinical Impression     1. Acute respiratory failure with hypoxia (HCC)        Disposition     PATIENT REFERRED TO:  No follow-up provider specified.     DISCHARGE MEDICATIONS:  New Prescriptions    No medications on file       DISPOSITION  admission        Jewell Carvalho MD  Resident  10/07/21 2001

## 2021-10-08 LAB
ANION GAP SERPL CALCULATED.3IONS-SCNC: 11 MMOL/L (ref 3–16)
BASOPHILS ABSOLUTE: 0 K/UL (ref 0–0.2)
BASOPHILS RELATIVE PERCENT: 0.1 %
BUN BLDV-MCNC: 28 MG/DL (ref 7–20)
CALCIUM SERPL-MCNC: 8.4 MG/DL (ref 8.3–10.6)
CHLORIDE BLD-SCNC: 98 MMOL/L (ref 99–110)
CO2: 29 MMOL/L (ref 21–32)
CREAT SERPL-MCNC: 1.4 MG/DL (ref 0.8–1.3)
EOSINOPHILS ABSOLUTE: 0 K/UL (ref 0–0.6)
EOSINOPHILS RELATIVE PERCENT: 0 %
GFR AFRICAN AMERICAN: 59
GFR NON-AFRICAN AMERICAN: 49
GLUCOSE BLD-MCNC: 121 MG/DL (ref 70–99)
GLUCOSE BLD-MCNC: 223 MG/DL (ref 70–99)
GLUCOSE BLD-MCNC: 240 MG/DL (ref 70–99)
GLUCOSE BLD-MCNC: 255 MG/DL (ref 70–99)
GLUCOSE BLD-MCNC: 272 MG/DL (ref 70–99)
GLUCOSE BLD-MCNC: 294 MG/DL (ref 70–99)
HCT VFR BLD CALC: 35.2 % (ref 40.5–52.5)
HEMOGLOBIN: 12.1 G/DL (ref 13.5–17.5)
LYMPHOCYTES ABSOLUTE: 0.6 K/UL (ref 1–5.1)
LYMPHOCYTES RELATIVE PERCENT: 7.6 %
MCH RBC QN AUTO: 29.8 PG (ref 26–34)
MCHC RBC AUTO-ENTMCNC: 34.4 G/DL (ref 31–36)
MCV RBC AUTO: 86.5 FL (ref 80–100)
MONOCYTES ABSOLUTE: 0.2 K/UL (ref 0–1.3)
MONOCYTES RELATIVE PERCENT: 3.3 %
NEUTROPHILS ABSOLUTE: 6.6 K/UL (ref 1.7–7.7)
NEUTROPHILS RELATIVE PERCENT: 89 %
PDW BLD-RTO: 14 % (ref 12.4–15.4)
PERFORMED ON: ABNORMAL
PLATELET # BLD: 180 K/UL (ref 135–450)
PMV BLD AUTO: 8.6 FL (ref 5–10.5)
POTASSIUM REFLEX MAGNESIUM: 4.9 MMOL/L (ref 3.5–5.1)
RBC # BLD: 4.07 M/UL (ref 4.2–5.9)
SODIUM BLD-SCNC: 138 MMOL/L (ref 136–145)
WBC # BLD: 7.4 K/UL (ref 4–11)

## 2021-10-08 PROCEDURE — 97116 GAIT TRAINING THERAPY: CPT

## 2021-10-08 PROCEDURE — 2700000000 HC OXYGEN THERAPY PER DAY

## 2021-10-08 PROCEDURE — 97530 THERAPEUTIC ACTIVITIES: CPT

## 2021-10-08 PROCEDURE — 6370000000 HC RX 637 (ALT 250 FOR IP): Performed by: STUDENT IN AN ORGANIZED HEALTH CARE EDUCATION/TRAINING PROGRAM

## 2021-10-08 PROCEDURE — 80048 BASIC METABOLIC PNL TOTAL CA: CPT

## 2021-10-08 PROCEDURE — 6360000002 HC RX W HCPCS: Performed by: STUDENT IN AN ORGANIZED HEALTH CARE EDUCATION/TRAINING PROGRAM

## 2021-10-08 PROCEDURE — 96376 TX/PRO/DX INJ SAME DRUG ADON: CPT

## 2021-10-08 PROCEDURE — 96375 TX/PRO/DX INJ NEW DRUG ADDON: CPT

## 2021-10-08 PROCEDURE — G0378 HOSPITAL OBSERVATION PER HR: HCPCS

## 2021-10-08 PROCEDURE — 6360000002 HC RX W HCPCS: Performed by: INTERNAL MEDICINE

## 2021-10-08 PROCEDURE — 6370000000 HC RX 637 (ALT 250 FOR IP): Performed by: INTERNAL MEDICINE

## 2021-10-08 PROCEDURE — 94150 VITAL CAPACITY TEST: CPT

## 2021-10-08 PROCEDURE — 2060000000 HC ICU INTERMEDIATE R&B

## 2021-10-08 PROCEDURE — 97162 PT EVAL MOD COMPLEX 30 MIN: CPT

## 2021-10-08 PROCEDURE — 97535 SELF CARE MNGMENT TRAINING: CPT

## 2021-10-08 PROCEDURE — 97165 OT EVAL LOW COMPLEX 30 MIN: CPT

## 2021-10-08 PROCEDURE — 94761 N-INVAS EAR/PLS OXIMETRY MLT: CPT

## 2021-10-08 PROCEDURE — 85025 COMPLETE CBC W/AUTO DIFF WBC: CPT

## 2021-10-08 PROCEDURE — 36415 COLL VENOUS BLD VENIPUNCTURE: CPT

## 2021-10-08 PROCEDURE — 2580000003 HC RX 258: Performed by: STUDENT IN AN ORGANIZED HEALTH CARE EDUCATION/TRAINING PROGRAM

## 2021-10-08 PROCEDURE — C9113 INJ PANTOPRAZOLE SODIUM, VIA: HCPCS | Performed by: STUDENT IN AN ORGANIZED HEALTH CARE EDUCATION/TRAINING PROGRAM

## 2021-10-08 RX ORDER — LEVOTHYROXINE SODIUM 137 UG/1
137 TABLET ORAL DAILY
Status: DISCONTINUED | OUTPATIENT
Start: 2021-10-08 | End: 2021-10-09 | Stop reason: HOSPADM

## 2021-10-08 RX ORDER — DEXAMETHASONE SODIUM PHOSPHATE 4 MG/ML
6 INJECTION, SOLUTION INTRA-ARTICULAR; INTRALESIONAL; INTRAMUSCULAR; INTRAVENOUS; SOFT TISSUE DAILY
Status: CANCELLED | OUTPATIENT
Start: 2021-10-09

## 2021-10-08 RX ORDER — TRAMADOL HYDROCHLORIDE 50 MG/1
50 TABLET ORAL ONCE
Status: COMPLETED | OUTPATIENT
Start: 2021-10-08 | End: 2021-10-08

## 2021-10-08 RX ORDER — HYDRALAZINE HYDROCHLORIDE 20 MG/ML
5 INJECTION INTRAMUSCULAR; INTRAVENOUS EVERY 6 HOURS PRN
Status: DISCONTINUED | OUTPATIENT
Start: 2021-10-08 | End: 2021-10-09 | Stop reason: HOSPADM

## 2021-10-08 RX ORDER — LANOLIN ALCOHOL/MO/W.PET/CERES
3 CREAM (GRAM) TOPICAL NIGHTLY PRN
Status: DISCONTINUED | OUTPATIENT
Start: 2021-10-08 | End: 2021-10-09 | Stop reason: HOSPADM

## 2021-10-08 RX ORDER — LABETALOL HYDROCHLORIDE 5 MG/ML
10 INJECTION, SOLUTION INTRAVENOUS EVERY 4 HOURS PRN
Status: DISCONTINUED | OUTPATIENT
Start: 2021-10-08 | End: 2021-10-09 | Stop reason: HOSPADM

## 2021-10-08 RX ORDER — SODIUM CHLORIDE 9 MG/ML
INJECTION, SOLUTION INTRAVENOUS CONTINUOUS
Status: ACTIVE | OUTPATIENT
Start: 2021-10-08 | End: 2021-10-08

## 2021-10-08 RX ORDER — MECLIZINE HYDROCHLORIDE 25 MG/1
12.5 TABLET ORAL 3 TIMES DAILY PRN
Status: DISCONTINUED | OUTPATIENT
Start: 2021-10-08 | End: 2021-10-09 | Stop reason: HOSPADM

## 2021-10-08 RX ADMIN — EZETIMIBE 10 MG: 10 TABLET ORAL at 09:14

## 2021-10-08 RX ADMIN — DEXAMETHASONE SODIUM PHOSPHATE 6 MG: 4 INJECTION, SOLUTION INTRAMUSCULAR; INTRAVENOUS at 09:14

## 2021-10-08 RX ADMIN — TRAMADOL HYDROCHLORIDE 50 MG: 50 TABLET ORAL at 03:26

## 2021-10-08 RX ADMIN — Medication 3 MG: at 23:13

## 2021-10-08 RX ADMIN — ISOSORBIDE MONONITRATE 30 MG: 30 TABLET, EXTENDED RELEASE ORAL at 09:14

## 2021-10-08 RX ADMIN — FINASTERIDE 5 MG: 5 TABLET, FILM COATED ORAL at 01:02

## 2021-10-08 RX ADMIN — LEVOTHYROXINE SODIUM 137 MCG: 137 TABLET ORAL at 06:42

## 2021-10-08 RX ADMIN — ENOXAPARIN SODIUM 40 MG: 40 INJECTION SUBCUTANEOUS at 09:13

## 2021-10-08 RX ADMIN — PANTOPRAZOLE SODIUM 40 MG: 40 INJECTION, POWDER, FOR SOLUTION INTRAVENOUS at 09:14

## 2021-10-08 RX ADMIN — LEVOTHYROXINE SODIUM 137 MCG: 137 TABLET ORAL at 06:39

## 2021-10-08 RX ADMIN — SODIUM CHLORIDE: 9 INJECTION, SOLUTION INTRAVENOUS at 03:29

## 2021-10-08 RX ADMIN — FINASTERIDE 5 MG: 5 TABLET, FILM COATED ORAL at 20:16

## 2021-10-08 RX ADMIN — ENOXAPARIN SODIUM 40 MG: 40 INJECTION SUBCUTANEOUS at 20:16

## 2021-10-08 RX ADMIN — Medication 5 ML: at 20:16

## 2021-10-08 RX ADMIN — MECLIZINE HYDROCHLORIDE 12.5 MG: 25 TABLET ORAL at 20:16

## 2021-10-08 RX ADMIN — MECLIZINE HYDROCHLORIDE 12.5 MG: 25 TABLET ORAL at 09:30

## 2021-10-08 ASSESSMENT — PAIN SCALES - GENERAL
PAINLEVEL_OUTOF10: 0
PAINLEVEL_OUTOF10: 0
PAINLEVEL_OUTOF10: 6
PAINLEVEL_OUTOF10: 0

## 2021-10-08 NOTE — PROGRESS NOTES
Progress Note    Admit Date: 10/7/2021  Day: 1  Diet: ADULT DIET; Regular; 3 carb choices (45 gm/meal)    CC: dizziness    Interval history:     Patient seen at bedside. Only complaint is dizziness. He tested positive Monday and was feeling dizzy Friday prior, so symptom duration is one week today. He went home from urgent care after receiving positive test and was prescribed Augmentin. He went home and later called ambulance due to persistent dizziness. He said this happened once before many years ago when he had an ear infection. Currently endorsing no ear pain, fullness. He does have sensorineural hearing loss. Otherwise he is not endorsing any additional symptoms including chills, chest pain, shortness of breath, cough, congestion, taste/smell changes, abdominal pain, nausea, vomiting, diarrhea. HPI:     Mr. Lizeth Paez is a 78 yo male PMH T2DM (last A1c 7.9 9/21), CAD s/p CABG, HTN BPH, hypothyroid, lower GI bleed who presents with dizziness. He initially thought it was due to low blood sugars but BG has been 140-160. Took his temp which was 102. 3. Has been vaccinated for covid but tested positive on Monday. Patient has been desatting into upper 80s while ambulating. Does not have COPD and has never smoked. Not sure where he got infected with covid. His only symptom is dizziness when he stands up. He has not fallen or passed out though.      ROS positive for dizziness, otherwise negative.      In ED patient HDS with T 102. Saturating well on 2L O2. Lab work-up was significant for elevated creatinine 1.5 (baseline 1.1-1.2), lactic acidosis (2.0) then resolved with fluids, hyperglycemia (141), no elevated white count, and VBG showed no acidosis.  Chest x-ray was negative for any cardiopulmonary abnormalities.  Patient was given a total of 1 L of normal saline.     Medications:     Scheduled Meds:   levothyroxine  137 mcg Oral Daily    enoxaparin  40 mg SubCUTAneous BID    [Held by provider] dexamethasone  6 mg last 72 hours. Lactate: No results for input(s): LACTATE in the last 72 hours. Cultures:  -----------------------------------------------------------------  RAD:   XR CHEST PORTABLE   Final Result      No evidence of acute cardiopulmonary disease. Assessment/Plan:       Covid PNA  - dexamethasone 6 mg daily - as patient is not hypoxic we will hold steroids for now and re-evaluate need if patient becomes hypoxic.  - CRP 39.1, ddimer 383, procalcitonin 0.1  - PPI  - Lovenox 40 mg BID  - daily CBC   - follow up orthostatic vitals to evaluate dizziness. Given antivert as symptoms may be due to BPPV vs COVID infection     Acute hypoxic respiratory failure  Likely due to covid pneumonia. Lactate 2.0 then 1.2 after fluids. Patient was desaturating on ambulation in ED but endorses no SOB. Currently no O2 requirements. - IS   - IVF resuscitation       JONO on CKD   Likely due to dehydration while on multiple diuretic medications. Cannot calculate FeNa as already received 1L bolus in ED. Patient does not complain of decreased urine output. - UA   - I/Os  - daily weights   - daily RFP, Mg      T2DM  Patient uses insulin pump and manages it himself. - insulin pump  - hypoglycemia protocol  - QID gluc checks      HTN  - hold losartan-HCTZ in setting of JONO  - hold Lasix in setting of JONO      BPH  - continue finasteride      CAD s/p CABG  - continue Imdur      Hypothyroid   - continue synthroid      Peripheral neuropathy  Due to diabetes. Patient takes gabapentin 600mg TID.    - holding in setting of JONO      HLD  - continue ezetimibe    Code Status: Full  FEN: Reg  PPX: Lovenox BID  DISPO: Ja Mayers MD, PGY-1  10/08/21  12:00 PM    This patient has been staffed and discussed with Aria Moreno MD.

## 2021-10-08 NOTE — DISCHARGE SUMMARY
INTERNAL MEDICINE DEPARTMENT AT [address] [place] [address]  DISCHARGE SUMMARY    Patient ID: Ten Liberian                                             Discharge Date: 10/11/2021   Patient's PCP: Jayme Lawson MD                                          Discharge Physician: Stephanie Katz MD MD  Admit Date: 10/7/2021   Admitting Physician: Osvaldo Morris DO    PROBLEMS DURING HOSPITALIZATION:  Present on Admission:   Acute respiratory failure with hypoxia ([address]      DISCHARGE DIAGNOSES:    HPI:    Mr. Glenn Cam is a 78 yo male PMH T2DM (last A1c 7.9 9/21), CAD s/p CABG, HTN BPH, hypothyroid, lower GI bleed who presents with dizziness. He initially thought it was due to low blood sugars but BG has been 140-160. Took his temp which was 102. 3. Has been vaccinated for covid but tested positive on Monday. Patient has been desatting into upper 80s while ambulating. Does not have COPD and has never smoked. Not sure where he got infected with covid. His only symptom is dizziness when he stands up. He has not fallen or passed out though.      ROS positive for dizziness, otherwise negative.      In ED patient HDS with T 102. Saturating well on 2L O2. Lab work-up was significant for elevated creatinine 1.5 (baseline 1.1-1.2), lactic acidosis (2.0) then resolved with fluids, hyperglycemia (141), no elevated white count, and VBG showed no acidosis.  Chest x-ray was negative for any cardiopulmonary abnormalities.  Patient was given a total of 1 L of normal saline. The following issues were addressed during hospitalization:    Covid PNA  Patient was admitted due to COVID testing positive and desaturating while ambulating on oxygen. He also had a fever of 102. Labs were ordered which were significant for elevated inflammatory markers such as CRP 39.1 and d-dimer 383. He was started on dexamethasone 6 mg daily. As patient was not requiring additional oxygen after initial evaluation and was not short of breath, oxygen was discontinued.  He was placed on Lovenox 40 mg BID for DVT prophylaxis. Patient's only symptom with respect to covid was dizziness. He was given Antivert which he will take upon discharge. Given his elevated d-dimer and risk for DVT, he will be discharged with Xarelto for prophylactic anticoagulation for 14 days. He will be discharged with home health PT/OT evaluation. JONO  Patient presented with elevated Cr of 1.5 increased from his baseline of 1.1. His lasix, losartan-hctz and gabapentin were held in the setting of JONO, and he was given labetalol PRN for blood pressure control. He was treated with IV fluid hydration. His Cr normalized to 1.2 on the day of discharge. He can resume his regular medications on discharge. DM2  Patient has history of DM2 and manages with home insulin pump U-500 conc. He managed the pump himself during admission. QID glucose checks were performed. Patient became hypoglycemic with glucose dropping to 44, and corrected after being given juice. He will be discharged with same home insulin pump regimen. Physical Exam:  BP (!) 152/67   Pulse 68   Temp 98.4 °F (36.9 °C) (Oral)   Resp 16   Ht 5' 8\" (1.727 m)   Wt 201 lb 15.1 oz (91.6 kg)   SpO2 97%   BMI 30.71 kg/m²     Constitutional:       Appearance: He is well-developed and appears stated age. HENT:      Head: Normocephalic and atraumatic. Cardiovascular:      Rate and Rhythm: RRR, normal S1/S2, no m/r/g      No JVD, no peripheral edema  Pulmonary:      Effort: No respiratory distress. Pulm effort normal      Breath sounds: CTAB, no wheezing/rales/ronchi  Abdominal:      General: Bowel sounds are normal.      Palpations: Abdomen is soft, nontender and nondistended   Musculoskeletal:         General: Normal range of motion.      Cervical back: Normal range of motion and neck supple.    Skin:     General: Skin is warm and dry.          Consults: none  Significant Diagnostic Studies:  chest x-ray  Treatments: IV hydration and steroids: solu-medrol  Disposition: home  Discharged Condition: Stable  Follow Up: Primary Care Physician in one week    DISCHARGE MEDICATION:     Medication List      START taking these medications    meclizine 12.5 MG tablet  Commonly known as: ANTIVERT  Take 2 tablets by mouth 3 times daily as needed for Dizziness     Xarelto 10 MG Tabs tablet  Generic drug: rivaroxaban  Take 1 tablet by mouth daily (with breakfast) for 14 days        CHANGE how you take these medications    furosemide 20 MG tablet  Commonly known as: Lasix  Take 1 tablet by mouth daily as needed (for leg swelling, or for weight increase more than 5lb in a day.)  What changed:   · when to take this  · reasons to take this        CONTINUE taking these medications    B-D ULTRAFINE III SHORT PEN 31G X 8 MM Misc  Generic drug: Insulin Pen Needle     blood glucose test strips strip  Commonly known as: [employer]ENS[employer] AUTODISC VI;ONE TOUCH ULTRA TEST VI     ezetimibe 10 MG tablet  Commonly known as: ZETIA     ferrous sulfate 325 (65 Fe) MG tablet  Commonly known as: IRON 325  Take 1 tablet by mouth daily (with breakfast)     finasteride 5 MG tablet  Commonly known as: PROSCAR     gabapentin 300 MG capsule  Commonly known as: NEURONTIN     INSULIN INFUSION PUMP     insulin regular human 500 UNIT/ML concentrated injection vial  Commonly known as: humuLIN R U-500     isosorbide mononitrate 30 MG extended release tablet  Commonly known as: IMDUR     levothyroxine 137 MCG tablet  Commonly known as: SYNTHROID     losartan-hydroCHLOROthiazide 100-12.5 MG per tablet  Commonly known as: HYZAAR     traMADol 50 MG tablet  Commonly known as: ULTRAM     triamcinolone 0.1 % cream  Commonly known as: KENALOG           Where to Get Your Medications      These medications were sent to [facility] [address]    Phone: [phone]   · furosemide 20 MG tablet  · meclizine 12.5 MG tablet  · Xarelto 10 MG Tabs tablet       Activity: activity as tolerated  Diet: diabetic diet  Wound Care: none needed    Time Spent on discharge is more than 30 minutes    Signed:  Leonard Grullon MD, PGY-1  10/11/2021

## 2021-10-08 NOTE — FLOWSHEET NOTE
10/08/21 1142   Encounter Summary   Services provided to: Patient   Continue Visiting   (es 10/8 acp)   Complexity of Encounter High   Length of Encounter 15 minutes   Advance Care Planning Yes   Routine   Type Initial   Assessment Approachable   Intervention Active listening   Outcome Engaged in conversation   Primary Decision Maker (Healthcare Proxy)   [facility] is: Legal Next of [facility] conversation with pt. By phone. Details in acp note.

## 2021-10-08 NOTE — PROGRESS NOTES
Occupational Therapy   Occupational Therapy Initial Assessment and Tx  Date: 10/8/2021   Patient Name: Ana Rojas  MRN: [id number]     : 1940    Date of Service: 10/8/2021    Discharge Recommendations: Ana Rojas scored a 18/24 on the AM-PAC ADL Inpatient form. Current research shows that an AM-PAC score of 18 or greater is typically associated with a discharge to the patient's home setting. Based on the patient's AM-PAC score, and their current ADL deficits, it is recommended that the patient have 2-3 sessions per week of Occupational Therapy at d/c to increase the patient's independence. At this time, this patient demonstrates the endurance and safety to discharge home with  (home vs OP services) and a follow up treatment frequency of 2-3x/wk. Please see assessment section for further patient specific details. If patient discharges prior to next session this note will serve as a discharge summary. Please see below for the latest assessment towards goals. OT Equipment Recommendations  Equipment Needed: Yes  Mobility Devices: ADL Assistive Devices  ADL Assistive Devices: Shower Chair with back    Assessment   Performance deficits / Impairments: Decreased functional mobility ; Decreased ADL status; Decreased endurance  Assessment: Pt from home with spouse, reporting ind pta. Pt admit with dizziness and covid + diagnosis. Pt currently requires CGA for all fx mobility and transfers, pt guarded with mobility, reaching for stable objects for support, but no LOB. Pt reporting has had  vertigo in the past. Pt completing toileting and stance at sink for ADLs. Pt would benefit from cont OT while in acute care.   Treatment Diagnosis: impaired mobility, transfers, ADL  Decision Making: Low Complexity  OT Education: OT Role;Plan of Care;ADL Adaptive Strategies;Transfer Training;Energy Conservation  Patient Education: verb understanding  Barriers to Learning: [place]  REQUIRES OT FOLLOW UP: Yes  Activity Tolerance  Activity Tolerance: Patient Tolerated treatment well  Safety Devices  Safety Devices in place: Yes  Type of devices: All fall risk precautions in place; Left in chair;Call light within reach;Nurse notified; Chair alarm in place;Gait belt;Patient at risk for falls           Patient Diagnosis(es): The encounter diagnosis was Acute respiratory failure with hypoxia ([facility] [address] has a past medical history of Anesthesia, Arthritis, Blood transfusion, CAD (coronary artery disease), Cancer ([facility] [address] Cataract, Diabetes, Heart disease, High blood pressure, Hx of blood clots, Sleep apnea, Thyroid disease, and Vascular disease. has a past surgical history that includes back surgery; Elbow surgery (5/10/11); Cardiac surgery; Abdomen surgery; Appendectomy; Uvulopalatopharygoplasty; Cataract removal with implant (2011); eye surgery (Bilateral, 12/20/11); joint replacement (Left, toe metatarsal); Cholecystectomy, laparoscopic (12/5/2013); Total shoulder arthroplasty (Right); Total knee arthroplasty (Bilateral); Spine surgery (01/2019); Colonoscopy (N/A, 5/6/2019); Colonoscopy (N/A, 5/18/2020); and Upper gastrointestinal endoscopy (N/A, 4/14/2021). Treatment Diagnosis: impaired mobility, transfers, ADL      Restrictions  Position Activity Restriction  Other position/activity restrictions: up with assist, droplet isolation covid +    Subjective   General  Chart Reviewed: Yes  Additional Pertinent Hx: 78 yo male PMH T2DM (last A1c 7.9 9/21), CAD s/p CABG, HTN BPH, hypothyroid, lower GI bleed who presents with dizziness. He initially thought it was due to low blood sugars but BG has been 140-160. Took his temp which was 102. 3. Has been vaccinated for covid but tested positive on Monday. Patient has been desatting into upper 80s while ambulating. Does not have COPD and has never smoked. Not sure where he got infected with covid. His only symptom is dizziness when he stands up.   Referring Practitioner: Ban Pérez Talon Amin MD  Diagnosis: acute respiratory failure, covid +  Subjective  Subjective: Pt in chair upon arrival, agreeable to session with encouragement, reporting no pain  Patient Currently in Pain: Denies  Vital Signs  Patient Currently in Pain: Denies  Social/Functional History  Social/Functional History  Lives With: Spouse  Type of Home: House  Home Layout: One level, Laundry in basement  Home Access: Stairs to enter with rails  Entrance Stairs - Number of Steps: 3  Bathroom Shower/Tub: Tub/Shower unit  Bathroom Toilet: Standard (vanity to push up)  Bathroom Equipment: Grab bars in [address] [place]: Rolling walker, Cane, Standard walker, Reacher  ADL Assistance: Independent  Homemaking Assistance: Independent  Ambulation Assistance: Independent  Transfer Assistance: Independent  Active : Yes  Occupation: Retired  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        Objective   Vision: Impaired  Vision Exceptions: Wears glasses at all times  Hearing: Exceptions to [facility]  Hearing Exceptions: Hard of hearing/hearing concerns;Bilateral hearing aid    Orientation  Overall Orientation Status: Within Functional Limits     Balance  Sitting Balance: Supervision  Standing Balance: Contact guard assistance  Standing Balance  Time: ~5-7 min  Activity: mobility in room, to and from bathroom, stance at sink  Functional Mobility  Functional - Mobility Device: No device  Activity: To/from bathroom; Other  Assist Level: Contact guard assistance  Functional Mobility Comments: CGA, no LOB, dizziness, reaching for stable objects, guarded  Toilet Transfers  Toilet - Technique: Ambulating  Equipment Used: Standard toilet  Toilet Transfer: Contact guard assistance  ADL  Grooming: Contact guard assistance (stance at sink, oral care, wipe face, comb hair)  Toileting: Contact guard assistance        Bed mobility  Comment:  In chair, left in chair end of session  Transfers  Sit to stand: Contact guard assistance  Stand to sit: Contact guard assistance     Cognition  Overall Cognitive Status: WFL                 LUE AROM (degrees)  LUE AROM : WFL  Left Hand AROM (degrees)  Left Hand AROM: WFL  RUE AROM (degrees)  RUE AROM : WFL  Right Hand AROM (degrees)  Right Hand AROM: WFL  LUE Strength  Gross LUE Strength: WFL  RUE Strength  Gross RUE Strength: WFL                   Plan   Plan  Times per week: 2-5  Times per day: Daily    AM-PAC Score        AM-PAC Inpatient Daily Activity Raw Score: 18 (10/08/21 1120)  AM-PAC Inpatient ADL T-Scale Score : 38.66 (10/08/21 1120)  ADL Inpatient CMS 0-100% Score: 46.65 (10/08/21 1120)  ADL Inpatient CMS G-Code Modifier : CK (10/08/21 1120)    Goals  Short term goals  Time Frame for Short term goals: d/c  Short term goal 1: sit<>stand SBA  Short term goal 2: Fx mobility SBA no LOB  Short term goal 3: grooming SPVN in stance at sink  Short term goal 4: toileting SPVN  Short term goal 5: LB dressing SBA       Therapy Time   Individual Concurrent Group Co-treatment   Time In 1019         Time Out 1100         Minutes 41              Timed Code Treatment Minutes:   30    Total Treatment Minutes:  34[address] Rachel Alexander, OT

## 2021-10-08 NOTE — H&P
Internal Medicine  PGY 1  History & Physical      CC  Dizziness    History Obtained From:  patient    HISTORY OF PRESENT ILLNESS:  Mr. Lizeth Paez is a 80 yo male PMH T2DM (last A1c 7.9 9/21), CAD s/p CABG, HTN BPH, hypothyroid, lower GI bleed who presents with dizziness. He initially thought it was due to low blood sugars but BG has been 140-160. Took his temp which was 102. 3. Has been vaccinated for covid but tested positive on Monday. Patient has been desatting into upper 80s while ambulating. Does not have COPD and has never smoked. Not sure where he got infected with covid. His only symptom is dizziness when he stands up. He has not fallen or passed out though. ROS positive for dizziness, otherwise negative. In ED patient HDS with T 102. Saturating well on 2L O2. Lab work-up was significant for elevated creatinine 1.5 (baseline 1.1-1.2), lactic acidosis (2.0) then resolved with fluids, hyperglycemia (141), no elevated white count, and VBG showed no acidosis. Chest x-ray was negative for any cardiopulmonary abnormalities. Patient was given a total of 1 L of normal saline.      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    Cataract     Diabetes     Heart disease     High blood pressure     Hx of blood clots     LLE 40 yrs ago    Sleep apnea     no machine    Thyroid disease     Vascular disease    ·     Past Surgical History:        Procedure Laterality Date    ABDOMEN SURGERY      Bowel surgery where a portion of his bowel was removed    APPENDECTOMY      BACK SURGERY      twice    CARDIAC SURGERY      bypass x4    CATARACT REMOVAL WITH IMPLANT  2011    right eye    CHOLECYSTECTOMY, LAPAROSCOPIC  12/5/2013    w/cholangiogram    COLONOSCOPY N/A 5/6/2019    COLONOSCOPY WITH BIOPSY performed by Alberta Acuna MD at [facility] 5/18/2020    COLONOSCOPY WITH BIOPSY performed by Syl Kennedy MD at [address]  5/10/11    Left ulnar nerve decompression    EYE SURGERY Bilateral 12/20/11    Left Eye Cataract Removal    JOINT REPLACEMENT Left toe metatarsal    SHOULDER ARTHROPLASTY Right     SPINE SURGERY  01/2019    Spinal cord stimulator    TOTAL KNEE ARTHROPLASTY Bilateral     UPPER GASTROINTESTINAL ENDOSCOPY N/A 4/14/2021    EGD BIOPSY performed by Syl Kennedy MD at [facility]     ·     Medications Priorto Admission:    · Not in a hospital admission. Allergies:  Bactrim, Mupirocin, Neomycin-bacitracin zn-polymyx, Polysporin [bacitracin-polymyxin b], Silver, Sulfa antibiotics, Tape [adhesive tape], Wound dressings, and Ancef [cefazolin sodium]    Social History:   · TOBACCO:   reports that he has never smoked. He has never used smokeless tobacco.  · ETOH:   reports no history of alcohol use. · DRUGS : None  · Patient currently lives at home with wife and daughter   ·   Family History:       Problem Relation Age of Onset    Cancer Other     Diabetes Other     Stroke Other    ·     Review of Systems    ROS: A 10 point review of systems was conducted, significant findings as noted in HPI. Physical Exam     Vitals:    10/07/21 1938   BP: 131/64   Pulse: 68   Resp: 15   Temp: 99.1 °F (37.3 °C)   SpO2: 99%     Constitutional:       Appearance: He is well-developed and appears stated age. Sitting comfortably in bedHe is obese. HENT:      Head: Normocephalic and atraumatic. Cardiovascular:      Rate and Rhythm: RRR, normal S1/S2, no m/r/g      No JVD, no peripheral edema, no peripheral pulses   Pulmonary:      Effort: No respiratory distress.  Pulm effort normal      Breath sounds: CTAB   Abdominal:      General: Bowel sounds are normal.      Palpations: Abdomen is soft, nontender and nondistended   Musculoskeletal: General: Normal range of motion. Cervical back: Normal range of motion and neck supple. Skin:     General: Skin is warm and dry. Capillary Refill: Capillary refill takes less than 2 seconds. Neurological:      Mental Status: He is alert and oriented to person, place, and time. CN II-XII intact, no focal neurologic deficit, decreased sensation in b/l feet, strength 5/5 in all extremities     DATA:    Labs:  CBC:   Recent Labs     10/07/21  1715   WBC 7.8   HGB 12.4*   HCT 36.9*          BMP:   Recent Labs     10/07/21  1714      K 4.5   CL 95*   CO2 31   BUN 25*   CREATININE 1.5*   GLUCOSE 141*     LFT's:   Recent Labs     10/07/21  1714   AST 18   ALT 13   BILITOT 0.7   ALKPHOS 70     Troponin: No results for input(s): TROPONINI in the last 72 hours. BNP:No results for input(s): BNP in the last 72 hours. ABGs: No results for input(s): PHART, NGI7AEI, PO2ART in the last 72 hours. INR: No results for input(s): INR in the last 72 hours. U/A:No results for input(s): NITRITE, COLORU, PHUR, LABCAST, WBCUA, RBCUA, MUCUS, TRICHOMONAS, YEAST, BACTERIA, CLARITYU, SPECGRAV, LEUKOCYTESUR, UROBILINOGEN, BILIRUBINUR, BLOODU, GLUCOSEU, AMORPHOUS in the last 72 hours. Invalid input(s): KETONESU    XR CHEST PORTABLE   Final Result      No evidence of acute cardiopulmonary disease. ASSESSMENT AND PLAN:  Mr. Lazaro Luo is a 78 yo male PMH T2DM, CAD s/p CABG, HTN BPH, hypothyroid, lower GI bleed who presents with dizziness. Patient tested positive for covid on Monday and he is fully vaccinated. Patients respiratory failure most likely due to covid pneumonia. He has no cough and CXR shows no focal consolidation. He does not have CHF or COPD. JONO likely due to dehydration while on multiple diuretic medications. Acute hypoxic respiratory failure  Likely due to covid pneumonia. Lactate 2.0 then 1.2 after fluids.    - wean oxygen as tolerated   - IS   - IVF resuscitation    Covid PNA  - dexamethasone 6 mg daily   - CRP, ddimer, procalcitonin   - PPI  - Lovenox 40 mg daily, increase to BID when JONO improves   - daily CBC     JONO on CKD   Likely due to dehydration while on multiple diuretic medications. Cannot calculate FeNa as already received 1L bolus in ED. Patient does not complain of decreased urine output. - UA   - I/Os  - daily weights   - daily RFP     T2DM  Patient uses insulin pump and manages it himself. - insulin pump  - hypoglycemia protocol  - QID gluc checks     HTN  - hold losartan-HCTZ in setting of JONO  - hold Lasix in setting of JONO     BPH  - continue finasteride     CAD s/p CABG  - continue Imdur     Hypothyroid   - continue synthroid     Peripheral neuropathy  Due to diabetes. Patient takes gabapentin 600mg TID. - holding in setting of JONO     HLD  - continue ezetimibe    Will discuss with attending physician Dr. Gregorio Melton. Code Status: Full code  FEN: Reg low carb diet   PPX: Lovenox   DISPO: Mariam Pradhan MD PGY-1   10/7/2021,  8:03 PM  I saw the patient independently from the resident . I discussed the care with the resident. I personally reviewed the HPI, PH, FH, SH, ROS and medications. I repeated pertinent portions of the examination and reviewed the relevant imaging and laboratory data. I agree with the findings, assessment and plan as documented. Patient is admitted for acute proximal respiratory failure secondary to COVID-19 pneumonia.   Plan as above

## 2021-10-08 NOTE — CARE COORDINATION
Case Management Assessment           Initial Evaluation                Date / Time of Evaluation: 10/8/2021 3:48 PM                 Assessment Completed by: Colt Delarosa RN    Patient Name: Raissa Pan     YOB: 1940  Diagnosis: Acute respiratory failure with hypoxia [facility]) [J96.01]     Date / Time: 10/7/2021  4:49 PM    Patient Admission Status: Inpatient    If patient is discharged prior to next notation, then this note serves as note for discharge by case management. Current PCP: Romy Shah MD  Clinic Patient: No    Chart Reviewed: Yes  Patient/ Family Interviewed: Yes    Initial assessment completed at bedside with: patient over the phone     Hospitalization in the last 30 days: No    Emergency Contacts:  Extended Emergency Contact Information  Primary Emergency Contact: Peggy Graham  Address: [address] [place] [address] [address] Phone: [phone]  Mobile Phone: [phone]  Relation: Spouse  Secondary Emergency Contact: [address] [place] [address] Phone: [phone]  Mobile Phone: [phone]  Relation: Child    Advance Directives:   Code Status: Full Code      Financial  Payor: Meghan Phillip / Plan: Connie Perdomo PPO / Product Type: Medicare /     Pre-cert required for SNF: Beck Sandoval [address] [place] [address], [address] [place] [address] [address]  [address]  Phone: [phone] Fax: [phone]      Potential assistance Purchasing Medications: Potential Assistance Purchasing Medications: No  Does Patient want to participate in local refill/ meds to beds program?: No    Meds To Beds General Rules:  1. Can ONLY be done Monday- Friday between 8:30am-5pm  2. Prescription(s) must be in pharmacy by 3pm to be filled same day  3. Copy of patient's insurance/ prescription drug card and patient face sheet must be sent along with the prescription(s)  4.  Cost of Rx cannot be added to hospital bill. If financial assistance is needed, please contact unit  or ;  or  CANNOT provide pharmacy voucher for patients co-pays  5. Patients can then  the prescription on their way out of the hospital at discharge, or pharmacy can deliver to the bedside if staff is available. (payment due at time of pick-up or delivery - cash, check, or card accepted)     Able to afford home medications/ co-pay costs: Yes    ADLS  Support Systems: Spouse/Significant Other    PT AM-PAC: 19 /24  OT AM-PAC: 18 /24    [place]: home with spouse  Steps: 3    Plans to RETURN to current housing: Yes  Barriers to RETURNING to current housing: none    Nancy Gamez 78  Currently ACTIVE with [address]: No  Home Care Agency: Not Applicable    Currently ACTIVE with [place] [facility]: No        Durable Medical Equipment  DME Provider: n/a  Equipment: [address] prior to admission: No  [address]: Not Applicable        DISCHARGE PLAN:  Disposition: Home- No Services Needed    Transportation PLAN for discharge: family     Factors facilitating achievement of predicted outcomes: Family support    Barriers to discharge: Medical complications    Additional Case Management Notes:   Patient is from home with spouse, independent pta, declines HHC at this time. Patient's spouse to transport home at discharge. The Plan for Transition of Care is related to the following treatment goals of Acute respiratory failure with hypoxia ([facility] [address] [J96.01]    The Patient and/or patient representative Nida Reyes and his family were provided with a choice of provider and agrees with the discharge plan Yes    Freedom of choice list was provided with basic dialogue that supports the patient's individualized plan of care/goals and shares the quality data associated with the providers.  Yes    Care Transition patient: Nunu Padron RN  [facility]  Case Management Department  Ph: [phone]   Fax: [phone]

## 2021-10-08 NOTE — PLAN OF CARE
Problem: Falls - Risk of:  Goal: Will remain free from falls  Description: Will remain free from falls  10/8/2021 0140 by Shankar Denney RN  Outcome: Ongoing    Goal: Absence of physical injury  Description: Absence of physical injury  10/8/2021 0140 by Shankar Denney RN  Outcome: Ongoing     Pt high fall risk. Instructed to use call light before getting out of bed. Call light within reach. Bed in low position. Bed alarm on. Will continue to monitor.

## 2021-10-08 NOTE — PROGRESS NOTES
Physical Therapy    Facility/Department: [place] [address] [facility]  Initial Assessment / treatment    NAME: Farnaz Garcia  : 1940  MRN: [id number]    Date of Service: 10/8/2021    Discharge Recommendations: Farnaz Garcia scored a 19/24 on the AM-PAC short mobility form. Current research shows that an AM-PAC score of 18 or greater is typically associated with a discharge to the patient's home setting. Based on the patient's AM-PAC score and their current functional mobility deficits, it is recommended that the patient have 2-3 sessions per week of Physical Therapy at d/c to increase the patient's independence. At this time, this patient demonstrates the endurance and safety to discharge home with home services and a follow up treatment frequency of 2-3x/wk. Please see assessment section for further patient specific details. If patient discharges prior to next session this note will serve as a discharge summary. Please see below for the latest assessment towards goals. PT Equipment Recommendations  Equipment Needed: No    Assessment   Body structures, Functions, Activity limitations: Decreased functional mobility   Assessment: Pt is 79 y.o. male admit with dizziness, COVID (+). Pt is slightly below his functional baseline. Demos slow and guarded gait 2* apprehensive about return of dizzy symptoms. Pt reports h/o vertigo 25 years ago and states current symptoms have been similar. Anticipate improved mobility as medical issues resolve and when ambulating in his own environment. Info provided for local balance disorder clinic per pts request.  Will follow. Rec 24hr assist initially at d/c.   Treatment Diagnosis: impaired gait and transfers  Prognosis: Good  Decision Making: Medium Complexity  PT Education: PT Role;Functional Mobility Training  Patient Education: pt demos good understanding  REQUIRES PT FOLLOW UP: Yes       Patient Diagnosis(es): The encounter diagnosis was Acute respiratory failure with hypoxia ([facility] [address] has a past medical history of Anesthesia, Arthritis, Blood transfusion, CAD (coronary artery disease), Cancer ([facility] [address] Cataract, Diabetes, Heart disease, High blood pressure, Hx of blood clots, Sleep apnea, Thyroid disease, and Vascular disease. has a past surgical history that includes back surgery; Elbow surgery (5/10/11); Cardiac surgery; Abdomen surgery; Appendectomy; Uvulopalatopharygoplasty; Cataract removal with implant (2011); eye surgery (Bilateral, 12/20/11); joint replacement (Left, toe metatarsal); Cholecystectomy, laparoscopic (12/5/2013); Total shoulder arthroplasty (Right); Total knee arthroplasty (Bilateral); Spine surgery (01/2019); Colonoscopy (N/A, 5/6/2019); Colonoscopy (N/A, 5/18/2020); and Upper gastrointestinal endoscopy (N/A, 4/14/2021). Restrictions  Position Activity Restriction  Other position/activity restrictions: up with assist, droplet isolation covid +  Vision/Hearing  Vision: Impaired  Vision Exceptions: Wears glasses at all times  Hearing: Exceptions to [facility]  Hearing Exceptions: Hard of hearing/hearing concerns;Bilateral hearing aid     Subjective  General  Chart Reviewed: Yes  Additional Pertinent Hx: Admit 10/7 with c/o dizziness, tested (+) for COVID earlier this week; (+) respiratory failure due to COVID PNA; PMHx: DM, CAD, HTN, back surgery, CABG, B TKR, L elbow surgery, L toe surgery  Referring Practitioner: Pippa Marrreo MD  Diagnosis: acute respiratory failure  Subjective  Subjective: Pt found sitting in chair. Pleasant and agreeable to PT.   Pain Screening  Patient Currently in Pain: Denies       Orientation  Orientation  Overall Orientation Status: Within Normal Limits  Social/Functional History  Social/Functional History  Lives With: Spouse  Type of Home: House  Home Layout: One level, Laundry in basement  Home Access: Stairs to enter with rails  Entrance Stairs - Number of Steps: 3  Bathroom Shower/Tub: Tub/Shower unit  Bathroom Toilet: Standard (vanity to goal 2: Pt will amb 50 ft with supervision  Patient Goals   Patient goals : return home       Therapy Time   Individual Concurrent Group Co-treatment   Time In 1016         Time Out 1100         Minutes 44               Timed Code Treatment Minutes:  29    Total Treatment Minutes:  40    If patient is discharged prior to next treatment, this note will serve as the discharge summary.   José Miguel Casper, PT, DPT  [id number]

## 2021-10-08 NOTE — PROGRESS NOTES
Physician Progress Note      PATIENT:               Cynthia Mohr  CSN #:                  [id number]  :                       1940  ADMIT DATE:       10/7/2021 4:49 PM  [address] [place] DATE:  RESPONDING  PROVIDER #:        Robson Luther MD          QUERY TEXT:    Patient admitted with Covid pna. Noted documentation of acute respiratory   failure in H&P. In order to support the diagnosis of acute respiratory   failure, please include additional clinical indicators in your documentation. Or please document if the diagnosis of acute respiratory failure has been   ruled out after further study. The medical record reflects the following:  Risk Factors: 80 yo w/ positive Covid pna  Clinical Indicators: Per ED: saturating at 92% on room air and in not acute   distress. Per H&P:  No respiratory distress. Pulm effort normal. RR 12 - 18,   sats 90 - 99%. No ABG'S. Treatment: 1-2L 02. Acute Respiratory Failure Clinical Indicators per 3[employer] MS-DRG Training Guide and   Quick Reference Guide:  pO2 < 60 mmHg or SpO2 (pulse oximetry) < 91% breathing room air  pCO2 > 50 and pH < 7.35  P/F ratio (pO2 / FIO2) < 300  pO2 decrease or pCO2 increase by 10 mmHg from baseline (if known)  Supplemental oxygen of 40% or more  Presence of respiratory distress, tachypnea, dyspnea, shortness of breath,   wheezing  Unable to speak in complete sentences  Use of accessory muscles to breathe  Extreme anxiety and feeling of impending doom  Tripod position  Confusion/altered mental status/obtunded  Options provided:  -- Acute Respiratory Failure ruled out after study  -- Acute Respiratory Failure as evidenced by, Please document evidence. -- Other - I will add my own diagnosis  -- Disagree - Not applicable / Not valid  -- Disagree - Clinically unable to determine / Unknown  -- Refer to Clinical Documentation Reviewer    PROVIDER RESPONSE TEXT:    Acute Respiratory Failure has been ruled out after study.     Query created by: Hossein Lama on 10/8/2021 6:29 AM      Electronically signed by:  Yessi Yono MD 10/8/2021 12:24 PM

## 2021-10-08 NOTE — ACP (ADVANCE CARE PLANNING)
Advance Care Planning     Advance Care Planning Inpatient Note  Spiritual Care Department    Today's Date: 10/8/2021  Unit: [place] [address] [facility]    Received request from rounding. Upon review of chart and communication with care team, patient's decision making abilities are not in question. . Patient was/were present in the room during visit. Goals of ACP Conversation:  Discuss advance care planning documents    Health Care Decision Makers:       Primary Decision Maker: Izzy Stallings - [phone]    Summary:  Silas Fong  Documented Next of Kin, per patient report    Advance Care Planning Documents (Patient Wishes):  None     Assessment:  Active in Confucianism and feels well supported by them spiritually. Pt's wife is HCPOA. She keeps close track of his medical records.      Interventions:  Provided education on documents for clarity and greater understanding  Discussed and provided education on state decision maker hierarchy  Encouraged ongoing ACP conversation with future decision makers and loved ones    Care Preferences Communicated:   No    Outcomes/Plan:  ACP Discussion: Completed  Teach Back Method used to verify the patient's and/or Healthcare Decision Maker's understanding of key information in the advance directive documents    Electronically signed by Xiomy [facility] on 10/8/2021 at 11:44 AM

## 2021-10-09 VITALS
HEART RATE: 68 BPM | DIASTOLIC BLOOD PRESSURE: 67 MMHG | HEIGHT: 68 IN | OXYGEN SATURATION: 97 % | SYSTOLIC BLOOD PRESSURE: 152 MMHG | TEMPERATURE: 98.4 F | BODY MASS INDEX: 30.61 KG/M2 | WEIGHT: 201.94 LBS | RESPIRATION RATE: 16 BRPM

## 2021-10-09 LAB
ANION GAP SERPL CALCULATED.3IONS-SCNC: 11 MMOL/L (ref 3–16)
BASOPHILS ABSOLUTE: 0 K/UL (ref 0–0.2)
BASOPHILS RELATIVE PERCENT: 0.1 %
BUN BLDV-MCNC: 28 MG/DL (ref 7–20)
CALCIUM SERPL-MCNC: 8.6 MG/DL (ref 8.3–10.6)
CHLORIDE BLD-SCNC: 100 MMOL/L (ref 99–110)
CO2: 27 MMOL/L (ref 21–32)
CREAT SERPL-MCNC: 1.2 MG/DL (ref 0.8–1.3)
EOSINOPHILS ABSOLUTE: 0 K/UL (ref 0–0.6)
EOSINOPHILS RELATIVE PERCENT: 0 %
GFR AFRICAN AMERICAN: >60
GFR NON-AFRICAN AMERICAN: 58
GLUCOSE BLD-MCNC: 109 MG/DL (ref 70–99)
GLUCOSE BLD-MCNC: 130 MG/DL (ref 70–99)
GLUCOSE BLD-MCNC: 44 MG/DL (ref 70–99)
GLUCOSE BLD-MCNC: 93 MG/DL (ref 70–99)
HCT VFR BLD CALC: 35 % (ref 40.5–52.5)
HEMOGLOBIN: 12 G/DL (ref 13.5–17.5)
LYMPHOCYTES ABSOLUTE: 0.6 K/UL (ref 1–5.1)
LYMPHOCYTES RELATIVE PERCENT: 4.5 %
MAGNESIUM: 2.1 MG/DL (ref 1.8–2.4)
MCH RBC QN AUTO: 29.4 PG (ref 26–34)
MCHC RBC AUTO-ENTMCNC: 34.3 G/DL (ref 31–36)
MCV RBC AUTO: 85.7 FL (ref 80–100)
MONOCYTES ABSOLUTE: 0.8 K/UL (ref 0–1.3)
MONOCYTES RELATIVE PERCENT: 5.6 %
NEUTROPHILS ABSOLUTE: 12.9 K/UL (ref 1.7–7.7)
NEUTROPHILS RELATIVE PERCENT: 89.8 %
PDW BLD-RTO: 14.3 % (ref 12.4–15.4)
PERFORMED ON: ABNORMAL
PERFORMED ON: ABNORMAL
PERFORMED ON: NORMAL
PLATELET # BLD: 213 K/UL (ref 135–450)
PMV BLD AUTO: 9.1 FL (ref 5–10.5)
POTASSIUM REFLEX MAGNESIUM: 4.4 MMOL/L (ref 3.5–5.1)
RBC # BLD: 4.08 M/UL (ref 4.2–5.9)
SODIUM BLD-SCNC: 138 MMOL/L (ref 136–145)
WBC # BLD: 14.4 K/UL (ref 4–11)

## 2021-10-09 PROCEDURE — C9113 INJ PANTOPRAZOLE SODIUM, VIA: HCPCS | Performed by: STUDENT IN AN ORGANIZED HEALTH CARE EDUCATION/TRAINING PROGRAM

## 2021-10-09 PROCEDURE — 6360000002 HC RX W HCPCS: Performed by: INTERNAL MEDICINE

## 2021-10-09 PROCEDURE — 6370000000 HC RX 637 (ALT 250 FOR IP): Performed by: STUDENT IN AN ORGANIZED HEALTH CARE EDUCATION/TRAINING PROGRAM

## 2021-10-09 PROCEDURE — 2580000003 HC RX 258: Performed by: STUDENT IN AN ORGANIZED HEALTH CARE EDUCATION/TRAINING PROGRAM

## 2021-10-09 PROCEDURE — 96376 TX/PRO/DX INJ SAME DRUG ADON: CPT

## 2021-10-09 PROCEDURE — 6370000000 HC RX 637 (ALT 250 FOR IP): Performed by: INTERNAL MEDICINE

## 2021-10-09 PROCEDURE — 85025 COMPLETE CBC W/AUTO DIFF WBC: CPT

## 2021-10-09 PROCEDURE — G0378 HOSPITAL OBSERVATION PER HR: HCPCS

## 2021-10-09 PROCEDURE — 96372 THER/PROPH/DIAG INJ SC/IM: CPT

## 2021-10-09 PROCEDURE — 83735 ASSAY OF MAGNESIUM: CPT

## 2021-10-09 PROCEDURE — 6360000002 HC RX W HCPCS: Performed by: STUDENT IN AN ORGANIZED HEALTH CARE EDUCATION/TRAINING PROGRAM

## 2021-10-09 PROCEDURE — 36415 COLL VENOUS BLD VENIPUNCTURE: CPT

## 2021-10-09 PROCEDURE — 80048 BASIC METABOLIC PNL TOTAL CA: CPT

## 2021-10-09 PROCEDURE — 2500000003 HC RX 250 WO HCPCS: Performed by: STUDENT IN AN ORGANIZED HEALTH CARE EDUCATION/TRAINING PROGRAM

## 2021-10-09 RX ORDER — RIVAROXABAN 10 MG/1
10 TABLET, FILM COATED ORAL
Qty: 14 TABLET | Refills: 0 | Status: SHIPPED | OUTPATIENT
Start: 2021-10-09 | End: 2021-10-23

## 2021-10-09 RX ORDER — MECLIZINE HCL 12.5 MG/1
25 TABLET ORAL 3 TIMES DAILY PRN
Qty: 30 TABLET | Refills: 0 | Status: SHIPPED | OUTPATIENT
Start: 2021-10-09 | End: 2021-10-19

## 2021-10-09 RX ORDER — FUROSEMIDE 20 MG/1
20 TABLET ORAL DAILY PRN
Qty: 60 TABLET | Refills: 3 | Status: SHIPPED | OUTPATIENT
Start: 2021-10-09

## 2021-10-09 RX ADMIN — EZETIMIBE 10 MG: 10 TABLET ORAL at 08:44

## 2021-10-09 RX ADMIN — Medication 10 ML: at 11:35

## 2021-10-09 RX ADMIN — ACETAMINOPHEN 650 MG: 325 TABLET ORAL at 08:54

## 2021-10-09 RX ADMIN — LEVOTHYROXINE SODIUM 137 MCG: 137 TABLET ORAL at 06:00

## 2021-10-09 RX ADMIN — MECLIZINE HYDROCHLORIDE 12.5 MG: 25 TABLET ORAL at 13:48

## 2021-10-09 RX ADMIN — ENOXAPARIN SODIUM 40 MG: 40 INJECTION SUBCUTANEOUS at 08:44

## 2021-10-09 RX ADMIN — ISOSORBIDE MONONITRATE 30 MG: 30 TABLET, EXTENDED RELEASE ORAL at 08:44

## 2021-10-09 RX ADMIN — PANTOPRAZOLE SODIUM 40 MG: 40 INJECTION, POWDER, FOR SOLUTION INTRAVENOUS at 08:44

## 2021-10-09 RX ADMIN — LABETALOL HYDROCHLORIDE 10 MG: 5 INJECTION, SOLUTION INTRAVENOUS at 08:54

## 2021-10-09 ASSESSMENT — PAIN DESCRIPTION - LOCATION: LOCATION: FOOT

## 2021-10-09 ASSESSMENT — PAIN DESCRIPTION - DESCRIPTORS: DESCRIPTORS: ACHING

## 2021-10-09 ASSESSMENT — PAIN DESCRIPTION - ONSET: ONSET: ON-GOING

## 2021-10-09 ASSESSMENT — PAIN DESCRIPTION - PROGRESSION
CLINICAL_PROGRESSION: GRADUALLY WORSENING
CLINICAL_PROGRESSION: GRADUALLY WORSENING

## 2021-10-09 ASSESSMENT — PAIN - FUNCTIONAL ASSESSMENT: PAIN_FUNCTIONAL_ASSESSMENT: ACTIVITIES ARE NOT PREVENTED

## 2021-10-09 ASSESSMENT — PAIN SCALES - GENERAL
PAINLEVEL_OUTOF10: 0
PAINLEVEL_OUTOF10: 3

## 2021-10-09 ASSESSMENT — PAIN DESCRIPTION - ORIENTATION: ORIENTATION: RIGHT;LEFT

## 2021-10-09 ASSESSMENT — PAIN DESCRIPTION - PAIN TYPE: TYPE: CHRONIC PAIN

## 2021-10-09 ASSESSMENT — PAIN DESCRIPTION - FREQUENCY: FREQUENCY: INTERMITTENT

## 2021-10-09 NOTE — PLAN OF CARE
Problem: Falls - Risk of:  Goal: Will remain free from falls  Description: Will remain free from falls  10/9/2021 1051 by Pauline Qureshi RN  Note: Pt is a Fall Risk. See [facility] Fall Risk Score. Pt bed in low position and side rails up. Call light and belongings in reach. Pt encouraged to call for assistance. Will continue with hourly rounds for PO intake, pain needs, toileting, and repositioning as needed. Problem: Pain:  Goal: Pain level will decrease  Description: Pain level will decrease  Note: Pt reporting pain in feet, PRN tylenol given. Will cont to monitor and assess pain     Problem: Gas Exchange - Impaired  Goal: Absence of hypoxia  10/9/2021 1051 by Pauline Qureshi RN  Note: SpO2 >90% on RA. No reports of SOB.  COVID positive, will cont to monitor

## 2021-10-09 NOTE — DISCHARGE INSTR - COC
Continuity of Care Form    Patient Name: Sheron Nieves   :  1940  MRN:  [id number]    Admit date:  10/7/2021  Discharge date:  10/9/21

## 2021-10-09 NOTE — PLAN OF CARE
No falls noted thus far this shift, bed in lowest position, alarm on, non-skid socks on, call light within reach, hourly checks, safety maintained, will continue to monitor. Call light within reach. Problem: Falls - Risk of:  Goal: Will remain free from falls  Description: Will remain free from falls  10/9/2021 0444 by Akhil Rizzo RN  Outcome: Ongoing     Pt has denied any acute pain thus far this shift. Problem: Pain:  Goal: Control of acute pain  Description: Control of acute pain  Outcome: Ongoing     Pt remains in mid to upper 90s on room air, denies shortness of breath. No acute distress noted this shift. Problem: Gas Exchange - Impaired  Goal: Absence of hypoxia  Outcome: Ongoing      Problem: Serum Glucose Level - Abnormal:  Goal: Ability to maintain appropriate glucose levels has stabilized  Description: Ability to maintain appropriate glucose levels has stabilized  Outcome: Ongoing    Pt had blood sugar of 120s last night around 11:15pm, he then turned off continuous pump as his sugar had dropped almost 200 points. BS checked at 0115 and noted at 44, pt drank 3 apple juices.  Rechecked at 3am (pt had checked it on his own between those 2 times), was 93, pt had 4 oz of regular Pepsi to hold him over til breakfast.

## 2021-10-09 NOTE — PROGRESS NOTES
.  Progress Note    Admit Date: 10/7/2021  Day: 1  Diet: ADULT DIET; Regular; 3 carb choices (45 gm/meal)    CC: dizziness    Interval history:     Patient seen at bedside. He says he feels fine and dizziness improved with antivert. He is complaining of neuropathic pain as his stimulator is without battery. He is hypertensive with systolic 929/54. He has been afebrile and his Cr is improved. HPI:     Mr. Juvenal Orozco is a 78 yo male PMH T2DM (last A1c 7.9 9/21), CAD s/p CABG, HTN BPH, hypothyroid, lower GI bleed who presents with dizziness. He initially thought it was due to low blood sugars but BG has been 140-160. Took his temp which was 102. 3. Has been vaccinated for covid but tested positive on Monday. Patient has been desatting into upper 80s while ambulating. Does not have COPD and has never smoked. Not sure where he got infected with covid. His only symptom is dizziness when he stands up. He has not fallen or passed out though.      ROS positive for dizziness, otherwise negative.      In ED patient HDS with T 102. Saturating well on 2L O2. Lab work-up was significant for elevated creatinine 1.5 (baseline 1.1-1.2), lactic acidosis (2.0) then resolved with fluids, hyperglycemia (141), no elevated white count, and VBG showed no acidosis.  Chest x-ray was negative for any cardiopulmonary abnormalities.  Patient was given a total of 1 L of normal saline.     Medications:     Scheduled Meds:   levothyroxine  137 mcg Oral Daily    enoxaparin  40 mg SubCUTAneous BID    [Held by provider] dexamethasone  6 mg IntraVENous Daily    pantoprazole  40 mg IntraVENous Daily    sodium chloride flush  5-40 mL IntraVENous 2 times per day    ezetimibe  10 mg Oral Daily    finasteride  5 mg Oral Nightly    isosorbide mononitrate  30 mg Oral Daily     Continuous Infusions:   Insulin Pump - insulin regular U-500 (CONC) 1 Units/hr (10/08/21 2016)    sodium chloride      dextrose       PRN Meds:meclizine, labetalol, hydrALAZINE, melatonin, sodium chloride flush, sodium chloride, ondansetron **OR** ondansetron, polyethylene glycol, acetaminophen **OR** acetaminophen, glucose, dextrose, glucagon (rDNA), dextrose    Objective:   Vitals:   T-max:  Patient Vitals for the past 8 hrs:   BP Temp Temp src Pulse Resp SpO2 Weight   10/09/21 0841 (!) 190/73 98.9 °F (37.2 °C) Oral 74 18 95 %    10/09/21 0600       201 lb 15.1 oz (91.6 kg)   10/09/21 0312 (!) 172/61 98.1 °F (36.7 °C) Oral 72 18 96 %        Intake/Output Summary (Last 24 hours) at 10/9/2021 0943  Last data filed at 10/9/2021 0901  Gross per 24 hour   Intake 1800 ml   Output 1100 ml   Net 700 ml       Review of Systems    Physical Exam    LABS:    CBC:   Recent Labs     10/07/21  1715 10/08/21  0548 10/09/21  0459   WBC 7.8 7.4 14.4*   HGB 12.4* 12.1* 12.0*   HCT 36.9* 35.2* 35.0*    180 213   MCV 87.5 86.5 85.7     Renal:    Recent Labs     10/07/21  1714 10/08/21  0548 10/09/21  0459    138 138   K 4.5 4.9 4.4   CL 95* 98* 100   CO2 31 29 27   BUN 25* 28* 28*   CREATININE 1.5* 1.4* 1.2   GLUCOSE 141* 240* 130*   CALCIUM 8.8 8.4 8.6   MG  --   --  2.10   ANIONGAP 11 11 11     Hepatic:   Recent Labs     10/07/21  1714   AST 18   ALT 13   BILITOT 0.7   PROT 7.6   LABALBU 4.1   ALKPHOS 70     Troponin: No results for input(s): TROPONINI in the last 72 hours. BNP: No results for input(s): BNP in the last 72 hours. Lipids: No results for input(s): CHOL, HDL in the last 72 hours. Invalid input(s): LDLCALCU, TRIGLYCERIDE  ABGs:  No results for input(s): PHART, JEV5LFY, PO2ART, XST4PQR, BEART, THGBART, B8EPWJIQ, XCN7UHZ in the last 72 hours. INR: No results for input(s): INR in the last 72 hours. Lactate: No results for input(s): LACTATE in the last 72 hours. Cultures:  -----------------------------------------------------------------  RAD:   XR CHEST PORTABLE   Final Result      No evidence of acute cardiopulmonary disease. Assessment/Plan:       Covid PNA  - dexamethasone 6 mg daily - as patient is not hypoxic we will hold steroids for now and re-evaluate need if patient becomes hypoxic.  - CRP 39.1, ddimer 383, procalcitonin 0.1  - PPI  - Lovenox 40 mg BID  - daily CBC   - follow up orthostatic vitals to evaluate dizziness. Given antivert as symptoms may be due to BPPV vs COVID infection     Acute hypoxic respiratory failure  Likely due to covid pneumonia. Lactate 2.0 then 1.2 after fluids. Patient was desaturating on ambulation in ED but endorses no SOB. Currently no O2 requirements. - IS   - IVF resuscitation       JONO on CKD   Likely due to dehydration while on multiple diuretic medications. Cannot calculate FeNa as already received 1L bolus in ED. Patient does not complain of decreased urine output. - UA   - I/Os  - daily weights   - daily RFP, Mg      T2DM  Patient uses insulin pump and manages it himself. - insulin pump  - hypoglycemia protocol  - QID gluc checks      HTN  - hold losartan-HCTZ in setting of JONO  - hold Lasix in setting of JONO      BPH  - continue finasteride      CAD s/p CABG  - continue Imdur      Hypothyroid   - continue synthroid      Peripheral neuropathy  Due to diabetes. Patient takes gabapentin 600mg TID.    - holding in setting of JONO      HLD  - continue ezetimibe    Code Status: Full  FEN: Reg  PPX: Lovenox BID  DISPO: John Mckinley MD, PGY-1  10/09/21  9:43 AM    This patient has been staffed and discussed with Jacquelyn Davis MD.

## 2021-10-11 ENCOUNTER — CARE COORDINATION (OUTPATIENT)
Dept: CASE MANAGEMENT | Age: 81
End: 2021-10-11

## 2021-10-11 NOTE — CARE COORDINATION
Patient contacted regarding COVID-19 diagnosis. Discussed COVID-19 related testing which was available at this time. Test results were positive. Patient informed of results, if available? Yes. Care Transition Nurse contacted the patient by telephone to perform post discharge assessment. Call within 2 business days of discharge: Yes. Verified name and  with patient as identifiers. Provided introduction to self, and explanation of the CTN/ACM role, and reason for call due to risk factors for infection and/or exposure to COVID-19. Symptoms reviewed with patient who verbalized the following symptoms: shortness of breath. Due to no new or worsening symptoms encounter was not routed to provider for escalation. Discussed follow-up appointments. If no appointment was previously scheduled, appointment scheduling offered: Yes. [address] follow up appointment(s): No future appointments. Non-face-to-face services provided:  Obtained and reviewed discharge summary and/or continuity of care documents  Education of patient/family/caregiver/guardian to support self-management-. Assessment and support for treatment adherence and medication management-. Advance Care Planning:   Does patient have an Advance Directive:  reviewed and current. Educated patient about risk for severe COVID-19 due to risk factors according to CDC guidelines. CTN reviewed discharge instructions, medical action plan and red flag symptoms with the patient who verbalized understanding. Discussed COVID vaccination status: Yes. Education provided on COVID-19 vaccination as appropriate. Discussed exposure protocols and quarantine with CDC Guidelines. Patient was given an opportunity to verbalize any questions and concerns and agrees to contact CTN or health care provider for questions related to their healthcare.     Reviewed and educated patient on any new and changed medications related to discharge diagnosis     Was patient discharged with a pulse oximeter? No Discussed and confirmed pulse oximeter discharge instructions and when to notify provider or seek emergency care. CTN spoke with patient this am for initial 24 hour discharge follow up [place] [phone] call. Patient states he is doing well, reporting no nausea, vomiting, fevers, chills, dizziness or lightheadedness. Patient states he is having some SOB/SOBE, improves with rest.  CTN encouraged patient to continue to monitor for any of the above s/s, as well as monitoring for any worsening SOB/SOBE, reporting to MD immediately. CTN provided contact information. Plan for follow-up call in 5-7 days based on severity of symptoms and risk factors.     Thank Opal Turner RN  Care Transition Coordinator  Contact ESTHER:[phone]

## 2021-10-19 ENCOUNTER — CARE COORDINATION (OUTPATIENT)
Dept: CASE MANAGEMENT | Age: 81
End: 2021-10-19

## 2021-10-19 NOTE — CARE COORDINATION
Patient contacted regarding COVID-19 diagnosis. Discussed COVID-19 related testing which was available at this time. Test results were positive. Patient informed of results, if available? Yes    Care Transition Nurse contacted the patient by telephone to perform follow-up assessment. Verified name and  with patient as identifiers. Patient has following risk factors of: immunocompromised, diabetes and CAD, CA, HTN, Thyroid Disease. .      Symptoms reviewed with patient who verbalized the following symptoms: Vertigo. .   Due to no new or worsening symptoms encounter was not routed to provider for escalation. Educated patient about risk for severe COVID-19 due to risk factors according to CDC guidelines. CTN reviewed discharge instructions, medical action plan and red flag symptoms with the patient who verbalized understanding. Discussed COVID vaccination status: Yes. Education provided on COVID-19 vaccination as appropriate. Discussed exposure protocols and quarantine with CDC Guidelines. Patient was given an opportunity to verbalize any questions and concerns and agrees to contact CTN or health care provider for questions related to their healthcare. Was patient discharged with a pulse oximeter? No Discussed and confirmed pulse oximeter discharge instructions and when to notify provider or seek emergency care. CTN spoke with patient this afternoon for follow up COVID -19 Monitoring call. Patient states he is still having severe vertigo, has follow up with PCP scheduled for 10/21/2021, Thursday. CTN encouraged patient to not attempt to ambulate, when vertigo is present. No other issues or concerns, no complaints of any fever, chills, nausea, vomiting, chest pain, SOB or cough. Patient with no congestion, pain, difficulty emptying bladder, LE edema, feeling lightheaded, dizziness, and heart palpitations. CTN provided contact information.  Plan for follow-up call in 5-7 days based on severity of symptoms and risk factors.     Thank Onofre Nava RN  Care Transition Coordinator  Contact ZPBLJZ:[phone]

## 2021-10-26 ENCOUNTER — CARE COORDINATION (OUTPATIENT)
Dept: CASE MANAGEMENT | Age: 81
End: 2021-10-26

## 2021-10-26 NOTE — CARE COORDINATION
Date/Time:  10/26/2021 11:16 AM  Attempted to reach patient by telephone. Call within 2 business days of discharge: Yes, Left HIPPA compliant message requesting a return call. Will attempt to reach patient again.     Thank Adali Persaud RN  Care Transition Coordinator  Contact WMNAMM:[phone]

## 2021-10-26 NOTE — CARE COORDINATION
Patient contacted regarding COVID-19 diagnosis. Discussed COVID-19 related testing which was available at this time. Test results were positive. Patient informed of results, if available? Yes    Care Transition Nurse contacted the patient by telephone to perform follow-up assessment. Verified name and  with patient as identifiers. Patient has following risk factors of: diabetes and CAD, Cancer, HTN. Anne Fong Symptoms reviewed with patient who verbalized the following symptoms: pain or aching joints, no new symptoms and no worsening symptoms. Due to no new or worsening symptoms encounter was not routed to provider for escalation. Educated patient about risk for severe COVID-19 due to risk factors according to CDC guidelines. CTN reviewed discharge instructions, medical action plan and red flag symptoms with the patient who verbalized understanding. Discussed COVID vaccination status: Yes. Education provided on COVID-19 vaccination as appropriate. Discussed exposure protocols and quarantine with CDC Guidelines. Patient was given an opportunity to verbalize any questions and concerns and agrees to contact CTN or health care provider for questions related to their healthcare. Was patient discharged with a pulse oximeter? No Discussed and confirmed pulse oximeter discharge instructions and when to notify provider or seek emergency care. CTN spoke with patient this am for follow up CTN call. Patient states he is doing well, not having any more SOB/SOBE, but states he hasn't done anything to over exert himself. Patient with no reports of any nausea, vomiting, fevers, chills, or cough. Patient got Flu Vaccine and COVID -19 Booster shot today. Patient also states he bought a pulse oximetry machine today, instructed to monitor periodically at rest and when up moving around, reporting any readings staying in the 80's. No other issues or concerns, no new or changed medications at this time.       CTN provided contact information. Plan for follow-up call in 5-7 days based on severity of symptoms and risk factors.     Thank Nancie Peraza RN  Care Transition Coordinator  Contact [facility]:[phone]

## 2021-11-01 ENCOUNTER — CARE COORDINATION (OUTPATIENT)
Dept: CASE MANAGEMENT | Age: 81
End: 2021-11-01

## 2021-11-01 NOTE — CARE COORDINATION
You Patient resolved from the Care Transitions episode on 11/01/2021  Discussed COVID-19 related testing which was available at this time. Test results were positive. Patient informed of results, if available? Yes, patient has not had another COVID -19 Test, since admission and discharge from the hospital.      Patient/family has been provided the following resources and education related to COVID-19:                         Signs, symptoms and red flags related to COVID-19            CDC exposure and quarantine guidelines            [employer] exposure contact - [phone]            Contact for their local Department of Health                 Patient currently reports that the following symptoms have improved:  no new/worsening symptoms. Patient states he is doing well, having some slight headache pain, states it isn't truly a headache though. CTN encouraged patient to continue to monitor for any worsening headache, as well as monitoring for any returning symptoms, reporting to MD immediately. No further outreach scheduled with this CTN/ACM. Episode of Care resolved. Patient has this CTN/ACM contact information if future needs arise.     Thank Julian Lee RN  Care Transition Coordinator  Contact QDYZVT:[phone]

## 2023-01-16 ENCOUNTER — TELEPHONE (OUTPATIENT)
Dept: ENDOCRINOLOGY | Age: 83
End: 2023-01-16

## 2023-06-07 ENCOUNTER — OFFICE VISIT (OUTPATIENT)
Dept: ENDOCRINOLOGY | Age: 83
End: 2023-06-07
Payer: MEDICARE

## 2023-06-07 VITALS
SYSTOLIC BLOOD PRESSURE: 149 MMHG | HEIGHT: 68 IN | HEART RATE: 64 BPM | BODY MASS INDEX: 32.13 KG/M2 | OXYGEN SATURATION: 96 % | DIASTOLIC BLOOD PRESSURE: 94 MMHG | TEMPERATURE: 98 F | RESPIRATION RATE: 14 BRPM | WEIGHT: 212 LBS

## 2023-06-07 DIAGNOSIS — E11.65 POORLY CONTROLLED TYPE 2 DIABETES MELLITUS WITH NEUROPATHY (HCC): Primary | ICD-10-CM

## 2023-06-07 DIAGNOSIS — N18.30 STAGE 3 CHRONIC KIDNEY DISEASE, UNSPECIFIED WHETHER STAGE 3A OR 3B CKD (HCC): ICD-10-CM

## 2023-06-07 DIAGNOSIS — E78.2 MIXED HYPERLIPIDEMIA: ICD-10-CM

## 2023-06-07 DIAGNOSIS — I10 PRIMARY HYPERTENSION: ICD-10-CM

## 2023-06-07 DIAGNOSIS — E66.9 CLASS 1 OBESITY WITH SERIOUS COMORBIDITY AND BODY MASS INDEX (BMI) OF 32.0 TO 32.9 IN ADULT, UNSPECIFIED OBESITY TYPE: ICD-10-CM

## 2023-06-07 DIAGNOSIS — E11.40 POORLY CONTROLLED TYPE 2 DIABETES MELLITUS WITH NEUROPATHY (HCC): Primary | ICD-10-CM

## 2023-06-07 DIAGNOSIS — E11.21 DIABETIC NEPHROPATHY ASSOCIATED WITH TYPE 2 DIABETES MELLITUS (HCC): ICD-10-CM

## 2023-06-07 DIAGNOSIS — E55.9 VITAMIN D DEFICIENCY: ICD-10-CM

## 2023-06-07 DIAGNOSIS — I25.10 CORONARY ARTERY DISEASE INVOLVING NATIVE CORONARY ARTERY OF NATIVE HEART WITHOUT ANGINA PECTORIS: ICD-10-CM

## 2023-06-07 PROCEDURE — 3080F DIAST BP >= 90 MM HG: CPT | Performed by: INTERNAL MEDICINE

## 2023-06-07 PROCEDURE — 1123F ACP DISCUSS/DSCN MKR DOCD: CPT | Performed by: INTERNAL MEDICINE

## 2023-06-07 PROCEDURE — 3077F SYST BP >= 140 MM HG: CPT | Performed by: INTERNAL MEDICINE

## 2023-06-07 PROCEDURE — 99204 OFFICE O/P NEW MOD 45 MIN: CPT | Performed by: INTERNAL MEDICINE

## 2023-06-07 PROCEDURE — 95251 CONT GLUC MNTR ANALYSIS I&R: CPT | Performed by: INTERNAL MEDICINE

## 2023-06-07 RX ORDER — AMLODIPINE BESYLATE 2.5 MG/1
TABLET ORAL DAILY
COMMUNITY
Start: 2022-10-17

## 2023-06-07 RX ORDER — ERGOCALCIFEROL 1.25 MG/1
50000 CAPSULE ORAL WEEKLY
Qty: 12 CAPSULE | Refills: 1 | Status: SHIPPED | OUTPATIENT
Start: 2023-06-07

## 2023-06-07 RX ORDER — ERGOCALCIFEROL 1.25 MG/1
CAPSULE ORAL WEEKLY
COMMUNITY
Start: 2023-04-20 | End: 2023-06-07 | Stop reason: SDUPTHER

## 2023-06-07 RX ORDER — MECLIZINE HCL 25MG 25 MG/1
TABLET, CHEWABLE ORAL 3 TIMES DAILY PRN
COMMUNITY
Start: 2023-04-12

## 2023-06-07 NOTE — PROGRESS NOTES
and complications of disease if inadequately treated, side effects of medications, diagnosis, treatment options, and prognosis, risks, benefits, complications, and alternatives of treatment, labs, imaging and other studies and treatment targets and goals. He understands instructions and counseling. These diagnosis were discussed and reviewed with the patient including the advantages of drug therapy. He was counseled at this visit on the following: diabetes complication prevention and foot care. Total time I spent for this encounter gathering history, performing physical exam, coordinating care, counseling, and documenting in the chart -45 minutes, excluding CGM interpretation time    CGMS Download Review and Recommendations  See scanned document for blood glucose tracing documentation  Guardian personal CGMS data downloaded and reviewed. This was separate service provided-CGM interpretation    Average glucose 156± 34 SD  Time in range: 78%  Time above 180: 22%  Time under 70: 0%   GMI 7.0%    Basal pattern review: Basal normoglycemia and hyperglycemia  Postprandial pattern review: Postprandial hyperglycemia  Hypoglycemia review: Rare  Activity related review: Not reported      Based on the data, I recommend:    1. Advised timely bolus injections    2. Healthy diet, balance macronutrients    3. Portion control, accurate carbohydrate counting    4. Hypoglycemia management    5. Upgrade insulin pump to Medtronic SquadMail 780 G/Guardian 4 CGM      Return in about 1 month (around 7/7/2023) for diabetes.     Electronically signed by Wu Peña MD on 6/7/2023 at 10:25 PM

## 2023-06-15 DIAGNOSIS — E78.2 MIXED HYPERLIPIDEMIA: ICD-10-CM

## 2023-06-15 DIAGNOSIS — E55.9 VITAMIN D DEFICIENCY: ICD-10-CM

## 2023-06-15 DIAGNOSIS — E11.65 POORLY CONTROLLED TYPE 2 DIABETES MELLITUS WITH NEUROPATHY (HCC): ICD-10-CM

## 2023-06-15 DIAGNOSIS — N18.30 STAGE 3 CHRONIC KIDNEY DISEASE, UNSPECIFIED WHETHER STAGE 3A OR 3B CKD (HCC): ICD-10-CM

## 2023-06-15 DIAGNOSIS — E11.40 POORLY CONTROLLED TYPE 2 DIABETES MELLITUS WITH NEUROPATHY (HCC): ICD-10-CM

## 2023-06-15 DIAGNOSIS — I10 PRIMARY HYPERTENSION: ICD-10-CM

## 2023-06-15 DIAGNOSIS — I25.10 CORONARY ARTERY DISEASE INVOLVING NATIVE CORONARY ARTERY OF NATIVE HEART WITHOUT ANGINA PECTORIS: ICD-10-CM

## 2023-06-15 DIAGNOSIS — E11.21 DIABETIC NEPHROPATHY ASSOCIATED WITH TYPE 2 DIABETES MELLITUS (HCC): ICD-10-CM

## 2023-06-16 LAB
25(OH)D3 SERPL-MCNC: 30.9 NG/ML
ALBUMIN SERPL-MCNC: 4.3 G/DL (ref 3.4–5)
ALBUMIN/GLOB SERPL: 1.5 {RATIO} (ref 1.1–2.2)
ALP SERPL-CCNC: 72 U/L (ref 40–129)
ALT SERPL-CCNC: 11 U/L (ref 10–40)
ANION GAP SERPL CALCULATED.3IONS-SCNC: 14 MMOL/L (ref 3–16)
AST SERPL-CCNC: 16 U/L (ref 15–37)
BILIRUB SERPL-MCNC: 0.5 MG/DL (ref 0–1)
BUN SERPL-MCNC: 17 MG/DL (ref 7–20)
CALCIUM SERPL-MCNC: 9.3 MG/DL (ref 8.3–10.6)
CHLORIDE SERPL-SCNC: 96 MMOL/L (ref 99–110)
CHOLEST SERPL-MCNC: 172 MG/DL (ref 0–199)
CO2 SERPL-SCNC: 26 MMOL/L (ref 21–32)
CREAT SERPL-MCNC: 1.2 MG/DL (ref 0.8–1.3)
CREAT UR-MCNC: 48.1 MG/DL (ref 39–259)
EST. AVERAGE GLUCOSE BLD GHB EST-MCNC: 188.6 MG/DL
GFR SERPLBLD CREATININE-BSD FMLA CKD-EPI: 60 ML/MIN/{1.73_M2}
GLUCOSE SERPL-MCNC: 209 MG/DL (ref 70–99)
HBA1C MFR BLD: 8.2 %
HDLC SERPL-MCNC: 43 MG/DL (ref 40–60)
LDL CHOLESTEROL CALCULATED: 82 MG/DL
MICROALBUMIN UR DL<=1MG/L-MCNC: 17.8 MG/DL
MICROALBUMIN/CREAT UR: 370.1 MG/G (ref 0–30)
POTASSIUM SERPL-SCNC: 3.9 MMOL/L (ref 3.5–5.1)
PROT SERPL-MCNC: 7.1 G/DL (ref 6.4–8.2)
SODIUM SERPL-SCNC: 136 MMOL/L (ref 136–145)
T4 FREE SERPL-MCNC: 1.2 NG/DL (ref 0.9–1.8)
TRIGL SERPL-MCNC: 235 MG/DL (ref 0–150)
TSH SERPL DL<=0.005 MIU/L-ACNC: 1.7 UIU/ML (ref 0.27–4.2)
VLDLC SERPL CALC-MCNC: 47 MG/DL

## 2023-06-17 LAB — C PEPTIDE SERPL-MCNC: 3.5 NG/ML (ref 1.1–4.4)

## 2023-06-20 ENCOUNTER — TELEPHONE (OUTPATIENT)
Dept: ENDOCRINOLOGY | Age: 83
End: 2023-06-20

## 2023-06-20 NOTE — TELEPHONE ENCOUNTER
Pt called in with questions about medtronic and how he is supposed to get his supplies and requesting a call back from Dr. Monique Wisdom assistant.

## 2023-06-20 NOTE — TELEPHONE ENCOUNTER
Spoke w/ pt. Pt needs to use [employer] for his supplies. Informed pt to contact his DME and have them send us the prepopulated form.

## 2023-07-12 ENCOUNTER — TELEPHONE (OUTPATIENT)
Dept: ENDOCRINOLOGY | Age: 83
End: 2023-07-12

## 2023-07-12 ENCOUNTER — OFFICE VISIT (OUTPATIENT)
Dept: ENDOCRINOLOGY | Age: 83
End: 2023-07-12
Payer: MEDICARE

## 2023-07-12 VITALS
WEIGHT: 215 LBS | DIASTOLIC BLOOD PRESSURE: 81 MMHG | HEART RATE: 67 BPM | HEIGHT: 68 IN | SYSTOLIC BLOOD PRESSURE: 148 MMHG | OXYGEN SATURATION: 96 % | RESPIRATION RATE: 14 BRPM | BODY MASS INDEX: 32.58 KG/M2 | TEMPERATURE: 98 F

## 2023-07-12 DIAGNOSIS — E78.2 MIXED HYPERLIPIDEMIA: ICD-10-CM

## 2023-07-12 DIAGNOSIS — E55.9 VITAMIN D DEFICIENCY: ICD-10-CM

## 2023-07-12 DIAGNOSIS — E11.65 POORLY CONTROLLED TYPE 2 DIABETES MELLITUS WITH NEUROPATHY (HCC): Primary | ICD-10-CM

## 2023-07-12 DIAGNOSIS — E11.21 DIABETIC NEPHROPATHY ASSOCIATED WITH TYPE 2 DIABETES MELLITUS (HCC): ICD-10-CM

## 2023-07-12 DIAGNOSIS — N18.30 STAGE 3 CHRONIC KIDNEY DISEASE, UNSPECIFIED WHETHER STAGE 3A OR 3B CKD (HCC): ICD-10-CM

## 2023-07-12 DIAGNOSIS — E11.42 DIABETIC POLYNEUROPATHY ASSOCIATED WITH TYPE 2 DIABETES MELLITUS (HCC): ICD-10-CM

## 2023-07-12 DIAGNOSIS — E66.9 CLASS 1 OBESITY WITH SERIOUS COMORBIDITY AND BODY MASS INDEX (BMI) OF 32.0 TO 32.9 IN ADULT, UNSPECIFIED OBESITY TYPE: ICD-10-CM

## 2023-07-12 DIAGNOSIS — E11.40 POORLY CONTROLLED TYPE 2 DIABETES MELLITUS WITH NEUROPATHY (HCC): Primary | ICD-10-CM

## 2023-07-12 DIAGNOSIS — I10 PRIMARY HYPERTENSION: ICD-10-CM

## 2023-07-12 DIAGNOSIS — I25.10 CORONARY ARTERY DISEASE INVOLVING NATIVE CORONARY ARTERY OF NATIVE HEART WITHOUT ANGINA PECTORIS: ICD-10-CM

## 2023-07-12 PROCEDURE — 3079F DIAST BP 80-89 MM HG: CPT | Performed by: INTERNAL MEDICINE

## 2023-07-12 PROCEDURE — 99214 OFFICE O/P EST MOD 30 MIN: CPT | Performed by: INTERNAL MEDICINE

## 2023-07-12 PROCEDURE — 95251 CONT GLUC MNTR ANALYSIS I&R: CPT | Performed by: INTERNAL MEDICINE

## 2023-07-12 PROCEDURE — 3077F SYST BP >= 140 MM HG: CPT | Performed by: INTERNAL MEDICINE

## 2023-07-12 PROCEDURE — G8417 CALC BMI ABV UP PARAM F/U: HCPCS | Performed by: INTERNAL MEDICINE

## 2023-07-12 PROCEDURE — 3052F HG A1C>EQUAL 8.0%<EQUAL 9.0%: CPT | Performed by: INTERNAL MEDICINE

## 2023-07-12 PROCEDURE — 1036F TOBACCO NON-USER: CPT | Performed by: INTERNAL MEDICINE

## 2023-07-12 PROCEDURE — 1123F ACP DISCUSS/DSCN MKR DOCD: CPT | Performed by: INTERNAL MEDICINE

## 2023-07-12 PROCEDURE — G8427 DOCREV CUR MEDS BY ELIG CLIN: HCPCS | Performed by: INTERNAL MEDICINE

## 2023-07-12 RX ORDER — PREGABALIN 75 MG/1
75 CAPSULE ORAL 4 TIMES DAILY
Qty: 120 CAPSULE | Refills: 3 | Status: SHIPPED | OUTPATIENT
Start: 2023-07-12 | End: 2023-11-09

## 2023-07-12 NOTE — PROGRESS NOTES
Dhruv Nelson is a 79 y.o. male who is being evaluated for Type 2 diabetes mellitus. Current symptoms/problems include  numbness, tingling and pain in his feet  and show no change. 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 Comorbid conditions:  HTN, hyperlipidemia, Neuropathy, and Coronary Artery Disease  Patient complains of pain, numbness and tingling in his feet bilaterally. Current diabetic medications include: Humulin R U-500 concentrated insulin  On MyTinkstronic MiniMed 770/Guardian    Intolerance to diabetes medications: No     Weight trend: stable  Prior visit with dietician: yes  Current diet: on average, 3 meals per day  Current exercise: walking, but unsteady on his feet     Current monitoring regimen: home blood tests - 6 times daily  Has brought blood glucose log/meter:  Yes  Home blood sugar records: fasting range: 100-140 and postprandial range: 100-150   Any episodes of hypoglycemia? Yes  Hypoglycemia frequency and time(s):  2 times weekly  Does patient have Glucagon emergency kit? Yes  Does patient have rapid acting carbohydrate? Yes  Does patient wear a medic alert bracelet or necklace? No    2. Coronary artery disease involving native coronary artery of native heart without angina pectoris  CABG 20 years ago  Patient denies chest pain  Sees cardiologist.    3. Primary hypertension  No headaches    4. Mixed hyperlipidemia  No muscle pain    5. Obesity  Eats healthy, active    6. Vitamin D deficiency  Has fatigue    7. Diabetic nephropathy associated with type 2 diabetes mellitus [facility])  Patient has some urination problems. 8. Stage 3 chronic kidney disease, unspecified whether stage 3a or 3b CKD ([address])  Has fatigue, urination problems. 9.  Diabetic polyneuropathy associated with type 2 diabetes mellitus  Patient complains of pain, numbness and tingling in his feet bilaterally.   Pain is

## 2023-07-12 NOTE — TELEPHONE ENCOUNTER
Per Adair Rodriguez @ [facility]         Lyrica 75 mg  DW of 1    Advised they do not carry this anylonger and he advised he doesn't this is not even covered by patients insurance.       Per Adair Rodriguez ask if he can dispense the generic which is covered and available      Please advise Adair Rodriguez @ [phone]    LYRICA 75 MG capsule   [facility] [id number] [facility] - Only, [address] [place] [address] Rd [address]   [address] [facility], [address] [facility]   Phone:  [phone]  Fax:  [phone]

## 2023-07-13 ENCOUNTER — TELEPHONE (OUTPATIENT)
Dept: ENDOCRINOLOGY | Age: 83
End: 2023-07-13

## 2023-07-13 NOTE — TELEPHONE ENCOUNTER
Submitted PA for pregabalin  [facility]ia [facility] Key: [id number] STATUS: PENDING. Follow up done daily; if no response in three days we will refax for status check. If another three days goes by with no response we will call the insurance for status.

## 2023-07-14 ENCOUNTER — TELEPHONE (OUTPATIENT)
Dept: ENDOCRINOLOGY | Age: 83
End: 2023-07-14

## 2023-07-14 DIAGNOSIS — E11.65 POORLY CONTROLLED TYPE 2 DIABETES MELLITUS WITH NEUROPATHY (HCC): ICD-10-CM

## 2023-07-14 DIAGNOSIS — E11.40 POORLY CONTROLLED TYPE 2 DIABETES MELLITUS WITH NEUROPATHY (HCC): ICD-10-CM

## 2023-07-14 NOTE — TELEPHONE ENCOUNTER
Alexa Paige Blue: [id number] - PA Case ID: [id number] - Rx #: [id number]  Outcome  Approved on July 13  Request Reference Number: [id number]. PREGABALIN CAP 75MG is approved through 12/31/2023. Your patient may now fill this prescription and it will be covered.

## 2023-07-14 NOTE — TELEPHONE ENCOUNTER
All meds over a 3 day supply need to go to   [address] ([facility] ) - [facility] [address] [phone] Vicenta Lopez [phone]   [address]   Phone:  [phone]  Fax:  [phone]    His   empagliflozin (JARDIANCE) 10 MG tablet   [facility] to [facility] and [facility] refills [facility]

## 2023-07-24 ENCOUNTER — HOSPITAL ENCOUNTER (OUTPATIENT)
Age: 83
Setting detail: OUTPATIENT SURGERY
Discharge: HOME | End: 2023-07-24
Attending: INTERNAL MEDICINE | Admitting: INTERNAL MEDICINE
Payer: MEDICARE

## 2023-07-24 VITALS
BODY MASS INDEX: 31.83 KG/M2 | SYSTOLIC BLOOD PRESSURE: 120 MMHG | HEIGHT: 68 IN | OXYGEN SATURATION: 96 % | DIASTOLIC BLOOD PRESSURE: 70 MMHG | HEART RATE: 56 BPM | RESPIRATION RATE: 16 BRPM | WEIGHT: 210 LBS | TEMPERATURE: 96.5 F

## 2023-07-24 DIAGNOSIS — Z86.010 HISTORY OF COLON POLYPS: ICD-10-CM

## 2023-07-24 LAB
GLUCOSE BLD-MCNC: 191 MG/DL (ref 70–99)
PERFORMED ON: ABNORMAL

## 2023-07-24 PROCEDURE — 99152 MOD SED SAME PHYS/QHP 5/>YRS: CPT | Performed by: INTERNAL MEDICINE

## 2023-07-24 PROCEDURE — 2709999900 HC NON-CHARGEABLE SUPPLY: Performed by: INTERNAL MEDICINE

## 2023-07-24 PROCEDURE — 99153 MOD SED SAME PHYS/QHP EA: CPT | Performed by: INTERNAL MEDICINE

## 2023-07-24 PROCEDURE — 3609010600 HC COLONOSCOPY POLYPECTOMY SNARE/COLD BIOPSY: Performed by: INTERNAL MEDICINE

## 2023-07-24 PROCEDURE — 6360000002 HC RX W HCPCS: Performed by: INTERNAL MEDICINE

## 2023-07-24 PROCEDURE — 7100000011 HC PHASE II RECOVERY - ADDTL 15 MIN: Performed by: INTERNAL MEDICINE

## 2023-07-24 PROCEDURE — 7100000010 HC PHASE II RECOVERY - FIRST 15 MIN: Performed by: INTERNAL MEDICINE

## 2023-07-24 PROCEDURE — 88305 TISSUE EXAM BY PATHOLOGIST: CPT

## 2023-07-24 RX ORDER — SODIUM CHLORIDE, SODIUM LACTATE, POTASSIUM CHLORIDE, CALCIUM CHLORIDE 600; 310; 30; 20 MG/100ML; MG/100ML; MG/100ML; MG/100ML
INJECTION, SOLUTION INTRAVENOUS CONTINUOUS
Status: DISCONTINUED | OUTPATIENT
Start: 2023-07-24 | End: 2023-07-24 | Stop reason: HOSPADM

## 2023-07-24 RX ORDER — MIDAZOLAM HYDROCHLORIDE 1 MG/ML
INJECTION INTRAMUSCULAR; INTRAVENOUS PRN
Status: DISCONTINUED | OUTPATIENT
Start: 2023-07-24 | End: 2023-07-24 | Stop reason: ALTCHOICE

## 2023-07-24 RX ORDER — FENTANYL CITRATE 50 UG/ML
INJECTION, SOLUTION INTRAMUSCULAR; INTRAVENOUS PRN
Status: DISCONTINUED | OUTPATIENT
Start: 2023-07-24 | End: 2023-07-24 | Stop reason: ALTCHOICE

## 2023-07-24 ASSESSMENT — PAIN SCALES - GENERAL
PAINLEVEL_OUTOF10: 0

## 2023-07-24 ASSESSMENT — PAIN SCALES - WONG BAKER
WONGBAKER_NUMERICALRESPONSE: 0

## 2023-07-24 ASSESSMENT — PAIN - FUNCTIONAL ASSESSMENT: PAIN_FUNCTIONAL_ASSESSMENT: 0-10

## 2023-07-24 NOTE — PROGRESS NOTES
Ambulatory Surgery/Procedure Discharge Note    Vitals:    07/24/23 0941   BP: 120/70   Pulse: 56   Resp: 16   Temp:    SpO2: 96%       No intake/output data recorded. Restroom use offered before discharge. Yes    Pain assessment:  level of pain (1-10, 10 severe)  Pain Level: 0  Pt to Endoscopy recovery post Colonoscopy. Pt denies pain at this time. Pt denies nausea at this time, PO fluids refused per pt request.   Discharge instructions given to pt's wife and she states understanding of these instructions. Pt and pt's wife state that pt is \"ready to go. \"       Patient discharged to home/self care.  Patient discharged via wheel chair by transporter to waiting family/S.O.       7/24/2023 10:18 AM

## 2023-08-12 DIAGNOSIS — E55.9 VITAMIN D DEFICIENCY: ICD-10-CM

## 2023-08-14 RX ORDER — ERGOCALCIFEROL 1.25 MG/1
CAPSULE ORAL
Qty: 13 CAPSULE | Refills: 3 | Status: SHIPPED | OUTPATIENT
Start: 2023-08-14

## 2023-11-28 PROBLEM — E66.811 CLASS 1 OBESITY WITH BODY MASS INDEX (BMI) OF 31.0 TO 31.9 IN ADULT: Status: ACTIVE | Noted: 2023-11-28

## 2023-11-28 PROBLEM — E66.9 CLASS 1 OBESITY WITH BODY MASS INDEX (BMI) OF 31.0 TO 31.9 IN ADULT: Status: ACTIVE | Noted: 2023-11-28

## 2023-11-29 ENCOUNTER — OFFICE VISIT (OUTPATIENT)
Dept: ENDOCRINOLOGY | Age: 83
End: 2023-11-29
Payer: MEDICARE

## 2023-11-29 VITALS
TEMPERATURE: 98 F | SYSTOLIC BLOOD PRESSURE: 150 MMHG | BODY MASS INDEX: 31.67 KG/M2 | WEIGHT: 209 LBS | DIASTOLIC BLOOD PRESSURE: 85 MMHG | RESPIRATION RATE: 14 BRPM | HEART RATE: 64 BPM | HEIGHT: 68 IN | OXYGEN SATURATION: 94 %

## 2023-11-29 DIAGNOSIS — E11.40 POORLY CONTROLLED TYPE 2 DIABETES MELLITUS WITH NEUROPATHY (HCC): ICD-10-CM

## 2023-11-29 DIAGNOSIS — E11.65 POORLY CONTROLLED TYPE 2 DIABETES MELLITUS WITH NEUROPATHY (HCC): ICD-10-CM

## 2023-11-29 DIAGNOSIS — E11.42 DIABETIC POLYNEUROPATHY ASSOCIATED WITH TYPE 2 DIABETES MELLITUS (HCC): ICD-10-CM

## 2023-11-29 DIAGNOSIS — E66.9 CLASS 1 OBESITY WITH BODY MASS INDEX (BMI) OF 31.0 TO 31.9 IN ADULT, UNSPECIFIED OBESITY TYPE, UNSPECIFIED WHETHER SERIOUS COMORBIDITY PRESENT: ICD-10-CM

## 2023-11-29 DIAGNOSIS — E55.9 VITAMIN D DEFICIENCY: ICD-10-CM

## 2023-11-29 DIAGNOSIS — I25.10 CORONARY ARTERY DISEASE INVOLVING NATIVE CORONARY ARTERY OF NATIVE HEART WITHOUT ANGINA PECTORIS: ICD-10-CM

## 2023-11-29 DIAGNOSIS — I10 PRIMARY HYPERTENSION: ICD-10-CM

## 2023-11-29 DIAGNOSIS — E11.40 POORLY CONTROLLED TYPE 2 DIABETES MELLITUS WITH NEUROPATHY (HCC): Primary | ICD-10-CM

## 2023-11-29 DIAGNOSIS — E11.65 POORLY CONTROLLED TYPE 2 DIABETES MELLITUS WITH NEUROPATHY (HCC): Primary | ICD-10-CM

## 2023-11-29 DIAGNOSIS — N18.30 STAGE 3 CHRONIC KIDNEY DISEASE, UNSPECIFIED WHETHER STAGE 3A OR 3B CKD (HCC): ICD-10-CM

## 2023-11-29 DIAGNOSIS — E11.21 DIABETIC NEPHROPATHY ASSOCIATED WITH TYPE 2 DIABETES MELLITUS (HCC): ICD-10-CM

## 2023-11-29 DIAGNOSIS — E78.2 MIXED HYPERLIPIDEMIA: ICD-10-CM

## 2023-11-29 LAB
25(OH)D3 SERPL-MCNC: 50.1 NG/ML
ALBUMIN SERPL-MCNC: 4.6 G/DL (ref 3.4–5)
ALBUMIN/GLOB SERPL: 1.5 {RATIO} (ref 1.1–2.2)
ALP SERPL-CCNC: 75 U/L (ref 40–129)
ALT SERPL-CCNC: 13 U/L (ref 10–40)
ANION GAP SERPL CALCULATED.3IONS-SCNC: 9 MMOL/L (ref 3–16)
AST SERPL-CCNC: 18 U/L (ref 15–37)
BILIRUB SERPL-MCNC: 0.7 MG/DL (ref 0–1)
BUN SERPL-MCNC: 16 MG/DL (ref 7–20)
CALCIUM SERPL-MCNC: 9.5 MG/DL (ref 8.3–10.6)
CHLORIDE SERPL-SCNC: 99 MMOL/L (ref 99–110)
CHOLEST SERPL-MCNC: 179 MG/DL (ref 0–199)
CO2 SERPL-SCNC: 32 MMOL/L (ref 21–32)
CREAT SERPL-MCNC: 1.2 MG/DL (ref 0.8–1.3)
CREAT UR-MCNC: 139.6 MG/DL (ref 39–259)
GFR SERPLBLD CREATININE-BSD FMLA CKD-EPI: 60 ML/MIN/{1.73_M2}
GLUCOSE SERPL-MCNC: 114 MG/DL (ref 70–99)
HDLC SERPL-MCNC: 45 MG/DL (ref 40–60)
LDL CHOLESTEROL CALCULATED: 102 MG/DL
MICROALBUMIN UR DL<=1MG/L-MCNC: 58.9 MG/DL
MICROALBUMIN/CREAT UR: 421.9 MG/G (ref 0–30)
POTASSIUM SERPL-SCNC: 4.6 MMOL/L (ref 3.5–5.1)
PROT SERPL-MCNC: 7.6 G/DL (ref 6.4–8.2)
SODIUM SERPL-SCNC: 140 MMOL/L (ref 136–145)
TRIGL SERPL-MCNC: 159 MG/DL (ref 0–150)
VLDLC SERPL CALC-MCNC: 32 MG/DL

## 2023-11-29 PROCEDURE — G8484 FLU IMMUNIZE NO ADMIN: HCPCS | Performed by: INTERNAL MEDICINE

## 2023-11-29 PROCEDURE — 99214 OFFICE O/P EST MOD 30 MIN: CPT | Performed by: INTERNAL MEDICINE

## 2023-11-29 PROCEDURE — G8427 DOCREV CUR MEDS BY ELIG CLIN: HCPCS | Performed by: INTERNAL MEDICINE

## 2023-11-29 PROCEDURE — 95251 CONT GLUC MNTR ANALYSIS I&R: CPT | Performed by: INTERNAL MEDICINE

## 2023-11-29 PROCEDURE — 3079F DIAST BP 80-89 MM HG: CPT | Performed by: INTERNAL MEDICINE

## 2023-11-29 PROCEDURE — 1036F TOBACCO NON-USER: CPT | Performed by: INTERNAL MEDICINE

## 2023-11-29 PROCEDURE — 3052F HG A1C>EQUAL 8.0%<EQUAL 9.0%: CPT | Performed by: INTERNAL MEDICINE

## 2023-11-29 PROCEDURE — G8417 CALC BMI ABV UP PARAM F/U: HCPCS | Performed by: INTERNAL MEDICINE

## 2023-11-29 PROCEDURE — 3077F SYST BP >= 140 MM HG: CPT | Performed by: INTERNAL MEDICINE

## 2023-11-29 PROCEDURE — 1123F ACP DISCUSS/DSCN MKR DOCD: CPT | Performed by: INTERNAL MEDICINE

## 2023-11-29 RX ORDER — PREGABALIN 75 MG/1
75 CAPSULE ORAL 4 TIMES DAILY
Qty: 120 CAPSULE | Refills: 3 | Status: SHIPPED | OUTPATIENT
Start: 2023-11-29 | End: 2024-03-28

## 2023-11-29 RX ORDER — BLOOD SUGAR DIAGNOSTIC
1 STRIP MISCELLANEOUS 4 TIMES DAILY
Qty: 360 EACH | Refills: 3 | Status: SHIPPED | OUTPATIENT
Start: 2023-11-29

## 2023-11-29 NOTE — PROGRESS NOTES
Elio Zambrano is a 79 y.o. male who is being evaluated for Type 2 diabetes mellitus. Current symptoms/problems include  numbness, tingling and pain in his feet  and show no change. 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 Comorbid conditions:  HTN, hyperlipidemia, Neuropathy, and Coronary Artery Disease  Patient complains of pain, numbness and tingling in his feet bilaterally. Current diabetic medications include: Humulin R U-500 concentrated insulin  On [employer] 780/Guardian4    Intolerance to diabetes medications: No     Weight trend: stable  Prior visit with dietician: yes  Current diet: on average, 3 meals per day  Current exercise: walking, but unsteady on his feet     Current monitoring regimen: home blood tests - 4 times daily  Has brought blood glucose log/meter:  Yes  Home blood sugar records: fasting range: 100-140 and postprandial range: 100-150   Any episodes of hypoglycemia? Yes  Hypoglycemia frequency and time(s):  2 times weekly  Does patient have Glucagon emergency kit? Yes  Does patient have rapid acting carbohydrate? Yes  Does patient wear a medic alert bracelet or necklace? No    2. Coronary artery disease involving native coronary artery of native heart without angina pectoris  CABG 20 years ago  Patient denies chest pain  Sees cardiologist.    3. Primary hypertension  No headaches    4. Mixed hyperlipidemia  No muscle pain    5. Obesity  Eats healthy, active    6. Vitamin D deficiency  Has fatigue    7. Diabetic nephropathy associated with type 2 diabetes mellitus [facility])  Patient has some urination problems. 8. Stage 3 chronic kidney disease, unspecified whether stage 3a or 3b CKD ([address])  Has fatigue, urination problems. 9.  Diabetic polyneuropathy associated with type 2 diabetes mellitus  Patient complains of pain, numbness and tingling in his feet bilaterally.   Pain is

## 2023-11-30 LAB
EST. AVERAGE GLUCOSE BLD GHB EST-MCNC: 182.9 MG/DL
HBA1C MFR BLD: 8 %

## 2023-12-08 ENCOUNTER — TELEPHONE (OUTPATIENT)
Dept: ENDOCRINOLOGY | Age: 83
End: 2023-12-08

## 2023-12-08 DIAGNOSIS — E11.42 DIABETIC POLYNEUROPATHY ASSOCIATED WITH TYPE 2 DIABETES MELLITUS (HCC): Primary | ICD-10-CM

## 2023-12-08 RX ORDER — GABAPENTIN 300 MG/1
900 CAPSULE ORAL 3 TIMES DAILY
Qty: 270 CAPSULE | Refills: 2 | Status: SHIPPED | OUTPATIENT
Start: 2023-12-08 | End: 2024-06-05

## 2023-12-08 NOTE — TELEPHONE ENCOUNTER
Pt is not feeling well on new medication. Pt says he has headaches all day tired and feels like he is gaining weight and it is not helping his feet. Pt asking to go back to his old medication.

## 2023-12-08 NOTE — TELEPHONE ENCOUNTER
I spoke with the patient. Sent prescription to pharmacy as patient requested gabapentin 300 mg 3 tablets 3 times a day. Patient stated that he has been doing that for several years and it was prescribed by [facility] physician. Patient will send me a copy of his the bottle or pharmacy prescription slip. Please put it in the chart when received.

## 2024-02-05 ENCOUNTER — TELEPHONE (OUTPATIENT)
Dept: ENDOCRINOLOGY | Age: 84
End: 2024-02-05

## 2024-02-05 NOTE — TELEPHONE ENCOUNTER
Spoke w/ pt. Pt not being seen by MD for Thyroid. Pt states he quit seeing the Physician he was originally seeing for Thyroid. Informed pt to ask PCP. Dr Hernandez will only be able to fill his thyroid medication IF he is scheduled in 40 min slot for \"new patient\" and we receive a referral from his PCP. Pt aware. Pt will ask PCP for thyroid refill

## 2024-02-15 ENCOUNTER — TELEPHONE (OUTPATIENT)
Dept: ENDOCRINOLOGY | Age: 84
End: 2024-02-15

## 2024-02-15 NOTE — TELEPHONE ENCOUNTER
Spoke w/ pt. Informed pt that we need referral for thyroid and more time for his next appt to evaluate/treat his thyroid. Pt will call pcp for referral to be faxed to us. He will ask pcp to refill his thyroid medication for now. He will have them fax referral to my attention at [place]

## 2024-02-15 NOTE — TELEPHONE ENCOUNTER
Pt asking for a refill on medication       levothyroxine (SYNTHROID) 112 MCG tablet         [facility] [phone]

## 2024-02-28 ENCOUNTER — OFFICE VISIT (OUTPATIENT)
Dept: ENDOCRINOLOGY | Age: 84
End: 2024-02-28
Payer: MEDICARE

## 2024-02-28 VITALS
BODY MASS INDEX: 31.07 KG/M2 | WEIGHT: 205 LBS | HEART RATE: 62 BPM | DIASTOLIC BLOOD PRESSURE: 97 MMHG | HEIGHT: 68 IN | RESPIRATION RATE: 14 BRPM | SYSTOLIC BLOOD PRESSURE: 142 MMHG | OXYGEN SATURATION: 96 % | TEMPERATURE: 98 F

## 2024-02-28 DIAGNOSIS — N18.30 STAGE 3 CHRONIC KIDNEY DISEASE, UNSPECIFIED WHETHER STAGE 3A OR 3B CKD (HCC): ICD-10-CM

## 2024-02-28 DIAGNOSIS — I10 PRIMARY HYPERTENSION: ICD-10-CM

## 2024-02-28 DIAGNOSIS — E11.21 DIABETIC NEPHROPATHY ASSOCIATED WITH TYPE 2 DIABETES MELLITUS (HCC): ICD-10-CM

## 2024-02-28 DIAGNOSIS — E11.42 DIABETIC POLYNEUROPATHY ASSOCIATED WITH TYPE 2 DIABETES MELLITUS (HCC): ICD-10-CM

## 2024-02-28 DIAGNOSIS — E11.40 POORLY CONTROLLED TYPE 2 DIABETES MELLITUS WITH NEUROPATHY (HCC): Primary | ICD-10-CM

## 2024-02-28 DIAGNOSIS — E66.9 CLASS 1 OBESITY WITH BODY MASS INDEX (BMI) OF 31.0 TO 31.9 IN ADULT, UNSPECIFIED OBESITY TYPE, UNSPECIFIED WHETHER SERIOUS COMORBIDITY PRESENT: ICD-10-CM

## 2024-02-28 DIAGNOSIS — E55.9 VITAMIN D DEFICIENCY: ICD-10-CM

## 2024-02-28 DIAGNOSIS — I25.10 CORONARY ARTERY DISEASE INVOLVING NATIVE CORONARY ARTERY OF NATIVE HEART WITHOUT ANGINA PECTORIS: ICD-10-CM

## 2024-02-28 DIAGNOSIS — E78.2 MIXED HYPERLIPIDEMIA: ICD-10-CM

## 2024-02-28 DIAGNOSIS — E11.65 POORLY CONTROLLED TYPE 2 DIABETES MELLITUS WITH NEUROPATHY (HCC): Primary | ICD-10-CM

## 2024-02-28 PROCEDURE — 1123F ACP DISCUSS/DSCN MKR DOCD: CPT | Performed by: INTERNAL MEDICINE

## 2024-02-28 PROCEDURE — 99214 OFFICE O/P EST MOD 30 MIN: CPT | Performed by: INTERNAL MEDICINE

## 2024-02-28 PROCEDURE — 3077F SYST BP >= 140 MM HG: CPT | Performed by: INTERNAL MEDICINE

## 2024-02-28 PROCEDURE — 95251 CONT GLUC MNTR ANALYSIS I&R: CPT | Performed by: INTERNAL MEDICINE

## 2024-02-28 PROCEDURE — G8427 DOCREV CUR MEDS BY ELIG CLIN: HCPCS | Performed by: INTERNAL MEDICINE

## 2024-02-28 PROCEDURE — G8417 CALC BMI ABV UP PARAM F/U: HCPCS | Performed by: INTERNAL MEDICINE

## 2024-02-28 PROCEDURE — G8484 FLU IMMUNIZE NO ADMIN: HCPCS | Performed by: INTERNAL MEDICINE

## 2024-02-28 PROCEDURE — 1036F TOBACCO NON-USER: CPT | Performed by: INTERNAL MEDICINE

## 2024-02-28 PROCEDURE — 3080F DIAST BP >= 90 MM HG: CPT | Performed by: INTERNAL MEDICINE

## 2024-02-28 RX ORDER — LEVOTHYROXINE SODIUM 137 UG/1
137 TABLET ORAL DAILY
Qty: 90 TABLET | Refills: 1 | Status: SHIPPED | OUTPATIENT
Start: 2024-02-28

## 2024-02-28 NOTE — PROGRESS NOTES
([facility])  Creatinine 1.5-1.4-1.2-1.2  GFR 45-21-68-60-60  Continue losartan  Increase Jardiance to 25 mg daily.  - Comprehensive Metabolic Panel; Future    9.  Diabetic polyneuropathy associated with type 2 diabetes mellitus  Patient complains of pain, numbness and tingling in his feet bilaterally.  Pain is severe and there is no improvement on gabapentin.  He also describes severe burning, tingling, achiness.  On Gabapentin 300 mg 3 capsules tid    10. Hypothyroidism  Dx in 2020.  On Levothyroxine 0.137 mg daily  Has dizziness      Reviewed and/or ordered clinical lab results Yes  Reviewed and/or ordered radiology tests Yes  Reviewed and/or ordered other diagnostic tests No  Discussed test results with performing physician No  Independently reviewed image, tracing, or specimen No  Made a decision to obtain old records No  Reviewed and summarized old records Yes  GFR 59  TSH 1.1  25OHvitamin D 18.0  HbA1C 8.0  Obtained history from other than patient Yes    Chon Graham was counseled regarding symptoms of diabetes, hyperlipidemia, chronic kidney disease, hypertension, vitamin D deficiency diagnosis, course and complications of disease if inadequately treated, side effects of medications, diagnosis, treatment options, and prognosis, risks, benefits, complications, and alternatives of treatment, labs, imaging and other studies and treatment targets and goals.  He understands instructions and counseling.    These diagnosis were discussed and reviewed with the patient including the advantages of drug therapy. He was counseled at this visit on the following: diabetes complication prevention and foot care.    CGMS Download Review and Recommendations  See scanned document for blood glucose tracing documentation  Guardian personal CGMS data downloaded and reviewed.  This was separate service provided-CGM interpretation    Average glucose 144± 38 SD  Time in range: 82 %  Time above 180: 18%  Time under 70: 0%   GMI

## 2024-03-20 ENCOUNTER — OFFICE VISIT (OUTPATIENT)
Dept: URGENT CARE | Age: 84
End: 2024-03-20

## 2024-03-20 VITALS
HEART RATE: 65 BPM | HEIGHT: 68 IN | TEMPERATURE: 98 F | BODY MASS INDEX: 31.67 KG/M2 | WEIGHT: 209 LBS | SYSTOLIC BLOOD PRESSURE: 153 MMHG | OXYGEN SATURATION: 97 % | DIASTOLIC BLOOD PRESSURE: 74 MMHG

## 2024-03-20 DIAGNOSIS — J06.9 VIRAL URI: Primary | ICD-10-CM

## 2024-03-20 DIAGNOSIS — R09.82 POSTNASAL DRIP: ICD-10-CM

## 2024-03-20 DIAGNOSIS — R05.1 ACUTE COUGH: ICD-10-CM

## 2024-03-20 DIAGNOSIS — J00 NASOPHARYNGITIS: ICD-10-CM

## 2024-03-20 DIAGNOSIS — I10 UNCONTROLLED HYPERTENSION: ICD-10-CM

## 2024-03-20 DIAGNOSIS — R53.83 OTHER FATIGUE: ICD-10-CM

## 2024-03-20 RX ORDER — GUAIFENESIN 600 MG/1
600 TABLET, EXTENDED RELEASE ORAL 2 TIMES DAILY
Qty: 20 TABLET | Refills: 0 | Status: SHIPPED | OUTPATIENT
Start: 2024-03-20 | End: 2024-03-30

## 2024-03-20 RX ORDER — BENZONATATE 200 MG/1
200 CAPSULE ORAL 3 TIMES DAILY PRN
Qty: 30 CAPSULE | Refills: 0 | Status: SHIPPED | OUTPATIENT
Start: 2024-03-20 | End: 2024-03-30

## 2024-03-20 NOTE — PATIENT INSTRUCTIONS
Guaifenesin prescribed for expectorant therapy.  Benzonatate prescribed for cough relief  Recommend OTC treatment for symptoms:  acetaminophen (Tylenol) for fevers and pain relief.  antihistamines (Claritin, Zyrtec, Allegra, or Xyzal) and nasal steroid sprays (such as Flonase) to help with nasal congestion and runny nose.  guaifenesin (Mucinex) can help with thinning out mucus which can help with chest congestion or with relieving persistent sinus pressure  throat sprays (Cepacol, chloraseptic) for throat pain.  dextromethorphan (Robitussin, Delsym), or throat lozenges for cough.  warm teas, humidifiers, nasal lavages, and sleeping in an inclined position are also helpful options that can lessen symptoms.    Follow up with your PCP within 5 days or, if unable, you can return to the clinic if symptoms persist beyond 5 days or if symptoms worsen.    Continue at-home monitoring of BP and follow up with PCP should elevated BP readings persist at home.  Follow up with your PCP for continued management of your blood pressure elevation noted in clinic.  If headaches, chest pain, shortness of breath, back pain, abdominal pain, weakness, numbness, or worsened concerns develop, follow up with the ER for further evaluation.    New Prescriptions    BENZONATATE (TESSALON) 200 MG CAPSULE    Take 1 capsule by mouth 3 times daily as needed for Cough    GUAIFENESIN (MUCINEX) 600 MG EXTENDED RELEASE TABLET    Take 1 tablet by mouth 2 times daily for 10 days

## 2024-03-20 NOTE — PROGRESS NOTES
Chon Graham (: 1940) is a 83 y.o. male, New patient, here for evaluation of the following chief complaint(s):  Pharyngitis (Pt states his grand kids and kids have been over at his house and now has a scratchy throat and is super fatigue. Pt states a body aches as well. )      ASSESSMENT/PLAN:    ICD-10-CM    1. Viral URI  J06.9 guaiFENesin (MUCINEX) 600 MG extended release tablet     benzonatate (TESSALON) 200 MG capsule      2. Nasopharyngitis  J00       3. Acute cough  R05.1 guaiFENesin (MUCINEX) 600 MG extended release tablet     benzonatate (TESSALON) 200 MG capsule      4. Postnasal drip  R09.82 guaiFENesin (MUCINEX) 600 MG extended release tablet      5. Other fatigue  R53.83       6. Uncontrolled hypertension  I10 External Referral To Primary Care          Symptoms, including nasal congestion, cough, voice hoarseness, and fatigue; with exam findings including lack of tonsillar or purulent findings, and with relatively short length of illness, consistent with a viral URI.  Low concern for bacterial sinusitis, otitis media, Strep pharyngitis, respiratory distress, pneumonia, bronchitis, and PE.  Guaifenesin prescribed for expectorant therapy.  Benzonatate prescribed for cough relief  Recommended OTC medication and home remedy treatments for symptomatic relief    Discussed pt's elevated BP noted during initial vital signs assessment - repeated BP assessment shows persistent elevation while in clinic - pt notes has hx of persistently elevated BP despite current medication regimen, and notes taking his HTN medications as directed.  Referral placed to pt's PCP for continued follow up regarding the pt's elevated BP.  Discussed continued at-home monitoring of BP and follow up with PCP should elevated BP readings persist at home.  If headaches, chest pain, shortness of breath, back pain, abdominal pain, weakness, numbness, or worsened concerns develop, follow up with the ER for further

## 2024-04-10 DIAGNOSIS — E11.40 POORLY CONTROLLED TYPE 2 DIABETES MELLITUS WITH NEUROPATHY (HCC): ICD-10-CM

## 2024-04-10 DIAGNOSIS — E11.65 POORLY CONTROLLED TYPE 2 DIABETES MELLITUS WITH NEUROPATHY (HCC): ICD-10-CM

## 2024-04-11 RX ORDER — EMPAGLIFLOZIN 25 MG/1
25 TABLET, FILM COATED ORAL DAILY
Qty: 90 TABLET | Refills: 0 | Status: SHIPPED | OUTPATIENT
Start: 2024-04-11

## 2024-05-08 DIAGNOSIS — E11.42 DIABETIC POLYNEUROPATHY ASSOCIATED WITH TYPE 2 DIABETES MELLITUS (HCC): ICD-10-CM

## 2024-05-08 DIAGNOSIS — E11.40 POORLY CONTROLLED TYPE 2 DIABETES MELLITUS WITH NEUROPATHY (HCC): ICD-10-CM

## 2024-05-08 DIAGNOSIS — E11.65 POORLY CONTROLLED TYPE 2 DIABETES MELLITUS WITH NEUROPATHY (HCC): ICD-10-CM

## 2024-05-08 DIAGNOSIS — E11.21 DIABETIC NEPHROPATHY ASSOCIATED WITH TYPE 2 DIABETES MELLITUS (HCC): ICD-10-CM

## 2024-05-08 DIAGNOSIS — E55.9 VITAMIN D DEFICIENCY: ICD-10-CM

## 2024-05-08 DIAGNOSIS — E78.2 MIXED HYPERLIPIDEMIA: ICD-10-CM

## 2024-05-08 LAB
25(OH)D3 SERPL-MCNC: 49 NG/ML
ALBUMIN SERPL-MCNC: 4.2 G/DL (ref 3.4–5)
ALBUMIN/GLOB SERPL: 1.5 {RATIO} (ref 1.1–2.2)
ALP SERPL-CCNC: 86 U/L (ref 40–129)
ALT SERPL-CCNC: 13 U/L (ref 10–40)
ANION GAP SERPL CALCULATED.3IONS-SCNC: 13 MMOL/L (ref 3–16)
ANTI-THYROGLOB ABS: 35 IU/ML
AST SERPL-CCNC: 17 U/L (ref 15–37)
BILIRUB SERPL-MCNC: 0.7 MG/DL (ref 0–1)
BUN SERPL-MCNC: 21 MG/DL (ref 7–20)
CALCIUM SERPL-MCNC: 9.5 MG/DL (ref 8.3–10.6)
CHLORIDE SERPL-SCNC: 100 MMOL/L (ref 99–110)
CHOLEST SERPL-MCNC: 165 MG/DL (ref 0–199)
CO2 SERPL-SCNC: 30 MMOL/L (ref 21–32)
CREAT SERPL-MCNC: 1.1 MG/DL (ref 0.8–1.3)
CREAT UR-MCNC: 91.5 MG/DL (ref 39–259)
GFR SERPLBLD CREATININE-BSD FMLA CKD-EPI: 66 ML/MIN/{1.73_M2}
GLUCOSE SERPL-MCNC: 85 MG/DL (ref 70–99)
HDLC SERPL-MCNC: 45 MG/DL (ref 40–60)
LDL CHOLESTEROL: 88 MG/DL
MICROALBUMIN UR DL<=1MG/L-MCNC: 21.7 MG/DL
MICROALBUMIN/CREAT UR: 237.2 MG/G (ref 0–30)
POTASSIUM SERPL-SCNC: 4 MMOL/L (ref 3.5–5.1)
PROT SERPL-MCNC: 7 G/DL (ref 6.4–8.2)
SODIUM SERPL-SCNC: 143 MMOL/L (ref 136–145)
T4 FREE SERPL-MCNC: 1.6 NG/DL (ref 0.9–1.8)
THYROPEROXIDASE AB SERPL IA-ACNC: 55 IU/ML
TRIGL SERPL-MCNC: 161 MG/DL (ref 0–150)
TSH SERPL DL<=0.005 MIU/L-ACNC: 0.96 UIU/ML (ref 0.27–4.2)
VLDLC SERPL CALC-MCNC: 32 MG/DL

## 2024-05-09 LAB
EST. AVERAGE GLUCOSE BLD GHB EST-MCNC: 168.6 MG/DL
HBA1C MFR BLD: 7.5 %

## 2024-05-29 ENCOUNTER — OFFICE VISIT (OUTPATIENT)
Dept: ENDOCRINOLOGY | Age: 84
End: 2024-05-29
Payer: MEDICARE

## 2024-05-29 ENCOUNTER — TELEPHONE (OUTPATIENT)
Dept: ENDOCRINOLOGY | Age: 84
End: 2024-05-29

## 2024-05-29 VITALS
RESPIRATION RATE: 14 BRPM | HEART RATE: 67 BPM | BODY MASS INDEX: 31.37 KG/M2 | OXYGEN SATURATION: 95 % | WEIGHT: 207 LBS | HEIGHT: 68 IN | SYSTOLIC BLOOD PRESSURE: 140 MMHG | TEMPERATURE: 98 F | DIASTOLIC BLOOD PRESSURE: 78 MMHG

## 2024-05-29 DIAGNOSIS — E66.9 CLASS 1 OBESITY WITH BODY MASS INDEX (BMI) OF 31.0 TO 31.9 IN ADULT, UNSPECIFIED OBESITY TYPE, UNSPECIFIED WHETHER SERIOUS COMORBIDITY PRESENT: ICD-10-CM

## 2024-05-29 DIAGNOSIS — N18.30 STAGE 3 CHRONIC KIDNEY DISEASE, UNSPECIFIED WHETHER STAGE 3A OR 3B CKD (HCC): ICD-10-CM

## 2024-05-29 DIAGNOSIS — E03.9 ACQUIRED HYPOTHYROIDISM: ICD-10-CM

## 2024-05-29 DIAGNOSIS — E55.9 VITAMIN D DEFICIENCY: ICD-10-CM

## 2024-05-29 DIAGNOSIS — E06.3 HASHIMOTO'S THYROIDITIS: ICD-10-CM

## 2024-05-29 DIAGNOSIS — I10 PRIMARY HYPERTENSION: ICD-10-CM

## 2024-05-29 DIAGNOSIS — I25.10 CORONARY ARTERY DISEASE INVOLVING NATIVE CORONARY ARTERY OF NATIVE HEART WITHOUT ANGINA PECTORIS: ICD-10-CM

## 2024-05-29 DIAGNOSIS — E78.2 MIXED HYPERLIPIDEMIA: ICD-10-CM

## 2024-05-29 DIAGNOSIS — E11.40 POORLY CONTROLLED TYPE 2 DIABETES MELLITUS WITH NEUROPATHY (HCC): Primary | ICD-10-CM

## 2024-05-29 DIAGNOSIS — E11.42 DIABETIC POLYNEUROPATHY ASSOCIATED WITH TYPE 2 DIABETES MELLITUS (HCC): ICD-10-CM

## 2024-05-29 DIAGNOSIS — E11.21 DIABETIC NEPHROPATHY ASSOCIATED WITH TYPE 2 DIABETES MELLITUS (HCC): ICD-10-CM

## 2024-05-29 DIAGNOSIS — E11.65 POORLY CONTROLLED TYPE 2 DIABETES MELLITUS WITH NEUROPATHY (HCC): Primary | ICD-10-CM

## 2024-05-29 PROCEDURE — 1123F ACP DISCUSS/DSCN MKR DOCD: CPT | Performed by: INTERNAL MEDICINE

## 2024-05-29 PROCEDURE — 3078F DIAST BP <80 MM HG: CPT | Performed by: INTERNAL MEDICINE

## 2024-05-29 PROCEDURE — 99214 OFFICE O/P EST MOD 30 MIN: CPT | Performed by: INTERNAL MEDICINE

## 2024-05-29 PROCEDURE — 1036F TOBACCO NON-USER: CPT | Performed by: INTERNAL MEDICINE

## 2024-05-29 PROCEDURE — G8427 DOCREV CUR MEDS BY ELIG CLIN: HCPCS | Performed by: INTERNAL MEDICINE

## 2024-05-29 PROCEDURE — G8417 CALC BMI ABV UP PARAM F/U: HCPCS | Performed by: INTERNAL MEDICINE

## 2024-05-29 PROCEDURE — 3077F SYST BP >= 140 MM HG: CPT | Performed by: INTERNAL MEDICINE

## 2024-05-29 PROCEDURE — 3051F HG A1C>EQUAL 7.0%<8.0%: CPT | Performed by: INTERNAL MEDICINE

## 2024-05-29 PROCEDURE — 95251 CONT GLUC MNTR ANALYSIS I&R: CPT | Performed by: INTERNAL MEDICINE

## 2024-05-29 RX ORDER — ERGOCALCIFEROL 1.25 MG/1
50000 CAPSULE ORAL WEEKLY
Qty: 13 CAPSULE | Refills: 3 | Status: SHIPPED | OUTPATIENT
Start: 2024-05-29

## 2024-05-29 RX ORDER — BLOOD SUGAR DIAGNOSTIC
1 STRIP MISCELLANEOUS 4 TIMES DAILY
Qty: 360 EACH | Refills: 3 | Status: SHIPPED | OUTPATIENT
Start: 2024-05-29

## 2024-05-29 RX ORDER — GABAPENTIN 300 MG/1
900 CAPSULE ORAL 3 TIMES DAILY
Qty: 270 CAPSULE | Refills: 2 | Status: CANCELLED | OUTPATIENT
Start: 2024-05-29 | End: 2024-11-25

## 2024-05-29 RX ORDER — LEVOTHYROXINE SODIUM 0.12 MG/1
125 TABLET ORAL DAILY
Qty: 90 TABLET | Refills: 1 | Status: SHIPPED | OUTPATIENT
Start: 2024-05-29

## 2024-05-29 RX ORDER — LEVOTHYROXINE SODIUM 137 UG/1
137 TABLET ORAL DAILY
Qty: 90 TABLET | Refills: 1 | Status: SHIPPED | OUTPATIENT
Start: 2024-05-29 | End: 2024-05-29 | Stop reason: SDUPTHER

## 2024-05-29 NOTE — TELEPHONE ENCOUNTER
Call from pt stating that he has no energy or drive to complete daily tasks     Pt stated that he would like to speak with Dr. Hernandez to discuss his low energy    Please advise   CB# [phone]

## 2024-05-29 NOTE — TELEPHONE ENCOUNTER
Spoke with patient.  Decreased levothyroxine to 0.125 mg daily.  TSH was 0.96.  Also advised to discuss his medications and problems with cardiology, PCP, nephrology, consider sleep evaluation.

## 2024-05-29 NOTE — PROGRESS NOTES
Chon Graham is a 83 y.o. male who is being evaluated for Type 2 diabetes mellitus.     Current symptoms/problems include  burning, tingling and pain in his feet  and show no change.     Poorly controlled type 2 diabetes mellitus with neuropathy ([facility]) [E11.40, E11.65]    Diagnosed with Type 2 diabetes mellitus in 1979.     Comorbid conditions:  HTN, hyperlipidemia, Neuropathy, and Coronary Artery Disease  Patient complains of pain, numbness and tingling in his feet bilaterally.    Current diabetic medications include: Humulin R U-500 concentrated insulin  On [employer] 780/Guardian4    Intolerance to diabetes medications: No     Weight trend: stable  Prior visit with dietician: yes  Current diet: on average, 3 meals per day  Current exercise: walking, but unsteady on his feet     Current monitoring regimen: home blood tests - 4 times daily  Has brought blood glucose log/meter:  Yes  Home blood sugar records: fasting range: 100-140 and postprandial range: 100-150   Any episodes of hypoglycemia? Yes  Hypoglycemia frequency and time(s):  2 times weekly  Does patient have Glucagon emergency kit? Yes  Does patient have rapid acting carbohydrate?  Yes  Does patient wear a medic alert bracelet or necklace?  No    2. Coronary artery disease involving native coronary artery of native heart without angina pectoris  CABG 20 years ago  Patient denies chest pain  Sees cardiologist.    3. Primary hypertension  No headaches    4. Mixed hyperlipidemia  No muscle pain    5. Obesity  Eats healthy, active    6. Vitamin D deficiency  Has fatigue    7. Diabetic nephropathy associated with type 2 diabetes mellitus (HCC)  Patient has some urination problems.    8. Stage 3 chronic kidney disease, unspecified whether stage 3a or 3b CKD ([facility])  Has fatigue, urination problems.    9.  Diabetic polyneuropathy associated with type 2 diabetes mellitus  Patient complains of pain, numbness and tingling in his feet bilaterally.  Pain is

## 2024-06-08 ENCOUNTER — OFFICE VISIT (OUTPATIENT)
Dept: URGENT CARE | Age: 84
End: 2024-06-08

## 2024-06-08 VITALS
TEMPERATURE: 97.7 F | OXYGEN SATURATION: 92 % | HEIGHT: 68 IN | HEART RATE: 76 BPM | BODY MASS INDEX: 31.07 KG/M2 | SYSTOLIC BLOOD PRESSURE: 136 MMHG | DIASTOLIC BLOOD PRESSURE: 69 MMHG | WEIGHT: 205 LBS
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
## 2024-06-08 DIAGNOSIS — L03.114 CELLULITIS OF LEFT UPPER EXTREMITY: Primary | ICD-10-CM

## 2024-06-08 RX ORDER — AMOXICILLIN AND CLAVULANATE POTASSIUM 875; 125 MG/1; MG/1
1 TABLET, FILM COATED ORAL 2 TIMES DAILY
Qty: 20 TABLET | Refills: 0 | Status: SHIPPED | OUTPATIENT
Start: 2024-06-08 | End: 2024-06-18

## 2024-06-08 NOTE — PROGRESS NOTES
noted below, none     Procedures    RESULTS:  No results found for this visit on 06/08/24.  An electronic signature was used to authenticate this note.    --Haseeb Drew PA-C

## 2024-06-08 NOTE — PATIENT INSTRUCTIONS
Augmentin is prescribed for antibiotic treatment of the skin infection  Take the antibiotics until completed, do not stop unless otherwise directed by a healthcare provider.  Recommend daily yogurt or other probiotics while on antibiotics.  Use hot compresses/warm soaks several times per day over the site, as well as any wound care.  Ibuprofen and/or acetaminophen for pain/swelling.  If swelling persists despite antibiotic treatment, to return to the clinic for further evaluation.  If fevers, body aches, nausea, vomiting, or shivering develop, or if swelling continues to expand, follow up with the ED for further evaluation.    New Prescriptions    AMOXICILLIN-CLAVULANATE (AUGMENTIN) 875-125 MG PER TABLET    Take 1 tablet by mouth 2 times daily for 10 days

## 2024-06-10 ENCOUNTER — TELEPHONE (OUTPATIENT)
Dept: ENDOCRINOLOGY | Age: 84
End: 2024-06-10

## 2024-06-10 ENCOUNTER — HOSPITAL ENCOUNTER (EMERGENCY)
Age: 84
Discharge: HOME | End: 2024-06-10
Attending: EMERGENCY MEDICINE
Payer: MEDICARE

## 2024-06-10 ENCOUNTER — OFFICE VISIT (OUTPATIENT)
Age: 84
End: 2024-06-10

## 2024-06-10 ENCOUNTER — APPOINTMENT (OUTPATIENT)
Dept: GENERAL RADIOLOGY | Age: 84
End: 2024-06-10
Payer: MEDICARE

## 2024-06-10 VITALS — BODY MASS INDEX: 31.07 KG/M2 | WEIGHT: 205 LBS | HEIGHT: 68 IN

## 2024-06-10 VITALS
DIASTOLIC BLOOD PRESSURE: 61 MMHG | RESPIRATION RATE: 20 BRPM | TEMPERATURE: 97.9 F | OXYGEN SATURATION: 95 % | SYSTOLIC BLOOD PRESSURE: 151 MMHG | HEART RATE: 67 BPM

## 2024-06-10 DIAGNOSIS — S61.452A DOG BITE OF LEFT HAND INCLUDING FINGERS WITH INFECTION, INITIAL ENCOUNTER: Primary | ICD-10-CM

## 2024-06-10 DIAGNOSIS — L08.9 DOG BITE OF LEFT HAND INCLUDING FINGERS WITH INFECTION, INITIAL ENCOUNTER: Primary | ICD-10-CM
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
## 2024-06-10 DIAGNOSIS — L03.012 CELLULITIS OF LEFT THUMB: Primary | ICD-10-CM

## 2024-06-10 DIAGNOSIS — S61.259A DOG BITE OF LEFT HAND INCLUDING FINGERS WITH INFECTION, INITIAL ENCOUNTER: Primary | ICD-10-CM

## 2024-06-10 LAB
ANION GAP SERPL CALCULATED.3IONS-SCNC: 12 MMOL/L (ref 3–16)
BASOPHILS # BLD: 0.1 K/UL (ref 0–0.2)
BASOPHILS NFR BLD: 1 %
BUN SERPL-MCNC: 16 MG/DL (ref 7–20)
CALCIUM SERPL-MCNC: 9.2 MG/DL (ref 8.3–10.6)
CHLORIDE SERPL-SCNC: 97 MMOL/L (ref 99–110)
CO2 SERPL-SCNC: 27 MMOL/L (ref 21–32)
CREAT SERPL-MCNC: 1.2 MG/DL (ref 0.8–1.3)
CRP SERPL-MCNC: 12.4 MG/L (ref 0–5.1)
DEPRECATED RDW RBC AUTO: 14.5 % (ref 12.4–15.4)
EOSINOPHIL # BLD: 0.2 K/UL (ref 0–0.6)
EOSINOPHIL NFR BLD: 3.6 %
ERYTHROCYTE [SEDIMENTATION RATE] IN BLOOD BY WESTERGREN METHOD: 43 MM/HR (ref 0–20)
GFR SERPLBLD CREATININE-BSD FMLA CKD-EPI: 59 ML/MIN/{1.73_M2}
GLUCOSE SERPL-MCNC: 173 MG/DL (ref 70–99)
HCT VFR BLD AUTO: 40.1 % (ref 40.5–52.5)
HGB BLD-MCNC: 13.2 G/DL (ref 13.5–17.5)
LYMPHOCYTES # BLD: 1.1 K/UL (ref 1–5.1)
LYMPHOCYTES NFR BLD: 17.1 %
MCH RBC QN AUTO: 28.8 PG (ref 26–34)
MCHC RBC AUTO-ENTMCNC: 32.9 G/DL (ref 31–36)
MCV RBC AUTO: 87.6 FL (ref 80–100)
MONOCYTES # BLD: 0.6 K/UL (ref 0–1.3)
MONOCYTES NFR BLD: 8.7 %
NEUTROPHILS NFR BLD: 69.6 %
PLATELET # BLD AUTO: 241 K/UL (ref 135–450)
PMV BLD AUTO: 8.8 FL (ref 5–10.5)
POTASSIUM SERPL-SCNC: 4.2 MMOL/L (ref 3.5–5.1)
RBC # BLD AUTO: 4.58 M/UL (ref 4.2–5.9)
SODIUM SERPL-SCNC: 136 MMOL/L (ref 136–145)
WBC # BLD AUTO: 6.5 K/UL (ref 4–11)

## 2024-06-10 PROCEDURE — 85025 COMPLETE CBC W/AUTO DIFF WBC: CPT

## 2024-06-10 PROCEDURE — 73130 X-RAY EXAM OF HAND: CPT

## 2024-06-10 PROCEDURE — 80048 BASIC METABOLIC PNL TOTAL CA: CPT

## 2024-06-10 PROCEDURE — 86140 C-REACTIVE PROTEIN: CPT

## 2024-06-10 PROCEDURE — 85652 RBC SED RATE AUTOMATED: CPT

## 2024-06-10 PROCEDURE — 6370000000 HC RX 637 (ALT 250 FOR IP): Performed by: EMERGENCY MEDICINE

## 2024-06-10 PROCEDURE — 99284 EMERGENCY DEPT VISIT MOD MDM: CPT

## 2024-06-10 RX ORDER — DOXYCYCLINE 100 MG/1
100 TABLET ORAL 2 TIMES DAILY
Qty: 20 TABLET | Refills: 0 | Status: SHIPPED | OUTPATIENT
Start: 2024-06-10 | End: 2024-06-20

## 2024-06-10 RX ORDER — DOXYCYCLINE 100 MG/1
100 CAPSULE ORAL ONCE
Status: COMPLETED | OUTPATIENT
Start: 2024-06-10 | End: 2024-06-10

## 2024-06-10 RX ADMIN — DOXYCYCLINE 100 MG: 100 CAPSULE ORAL at 10:02

## 2024-06-10 ASSESSMENT — PAIN - FUNCTIONAL ASSESSMENT: PAIN_FUNCTIONAL_ASSESSMENT: 0-10

## 2024-06-10 ASSESSMENT — LIFESTYLE VARIABLES
HOW OFTEN DO YOU HAVE A DRINK CONTAINING ALCOHOL: NEVER
HOW MANY STANDARD DRINKS CONTAINING ALCOHOL DO YOU HAVE ON A TYPICAL DAY: PATIENT DOES NOT DRINK

## 2024-06-10 ASSESSMENT — PAIN DESCRIPTION - LOCATION: LOCATION: HAND

## 2024-06-10 ASSESSMENT — PAIN SCALES - GENERAL: PAINLEVEL_OUTOF10: 7

## 2024-06-10 ASSESSMENT — PAIN DESCRIPTION - ORIENTATION: ORIENTATION: LEFT

## 2024-06-10 NOTE — DISCHARGE INSTRUCTIONS
You were seen in the emergency department for an infection on your left thumb from a dog bite.  We have added on a second antibiotic to what you are already taking.  You should follow-up with Dr. Simon Brewer this afternoon at 2 PM for further evaluation and possible debridement.  If for some reason you are unable to do this please return to the emergency department for further evaluation.

## 2024-06-10 NOTE — ED PROVIDER NOTES
Addressed:  Cellulitis of left thumb: acute illness or injury    Amount and/or Complexity of Data Reviewed  Labs: ordered. Decision-making details documented in ED Course.  Radiology: ordered. Decision-making details documented in ED Course.  Discussion of management or test interpretation with external provider(s): Simon Jarvis  Prescription drug management.        Clinical Impression     1. Cellulitis of left thumb        Disposition     PATIENT REFERRED TO:  Paolo Brewer MD  [address] [place] [address] [phone]      Today at 2 PM      DISCHARGE MEDICATIONS:  New Prescriptions    DOXYCYCLINE MONOHYDRATE (ADOXA) 100 MG TABLET    Take 1 tablet by mouth 2 times daily for 10 days       DISPOSITION Discharge - Pending Orders Complete 06/10/2024 09:53:59 AM        Diagnostic Results and Other Data     RADIOLOGY:  XR HAND LEFT (MIN 3 VIEWS)   Final Result   No acute osseous abnormality.      Soft tissue swelling left thumb.      Electronically signed by Beau Moore          LABS:   Results for orders placed or performed during the hospital encounter of 06/10/24   CBC with Auto Differential   Result Value Ref Range    WBC 6.5 4.0 - 11.0 K/uL    RBC 4.58 4.20 - 5.90 M/uL    Hemoglobin 13.2 (L) 13.5 - 17.5 g/dL    Hematocrit 40.1 (L) 40.5 - 52.5 %    MCV 87.6 80.0 - 100.0 fL    MCH 28.8 26.0 - 34.0 pg    MCHC 32.9 31.0 - 36.0 g/dL    RDW 14.5 12.4 - 15.4 %    Platelets 241 135 - 450 K/uL    MPV 8.8 5.0 - 10.5 fL    Neutrophils % 69.6 %    Lymphocytes % 17.1 %    Monocytes % 8.7 %    Eosinophils % 3.6 %    Basophils % 1.0 %    Neutrophils Absolute 4.5 1.7 - 7.7 K/uL    Lymphocytes Absolute 1.1 1.0 - 5.1 K/uL    Monocytes Absolute 0.6 0.0 - 1.3 K/uL    Eosinophils Absolute 0.2 0.0 - 0.6 K/uL    Basophils Absolute 0.1 0.0 - 0.2 K/uL   BMP w/ Reflex to MG   Result Value Ref Range    Sodium 136 136 - 145 mmol/L    Potassium reflex Magnesium 4.2 3.5 - 5.1 mmol/L    Chloride 97 (L) 99 - 110 mmol/L

## 2024-06-15 DIAGNOSIS — E11.65 POORLY CONTROLLED TYPE 2 DIABETES MELLITUS WITH NEUROPATHY (HCC): ICD-10-CM

## 2024-06-15 DIAGNOSIS — E11.40 POORLY CONTROLLED TYPE 2 DIABETES MELLITUS WITH NEUROPATHY (HCC): ICD-10-CM

## 2024-06-17 ENCOUNTER — OFFICE VISIT (OUTPATIENT)
Age: 84
End: 2024-06-17
Payer: MEDICARE

## 2024-06-17 VITALS — BODY MASS INDEX: 31.07 KG/M2 | HEIGHT: 68 IN | WEIGHT: 205 LBS

## 2024-06-17 DIAGNOSIS — S61.259D DOG BITE OF LEFT HAND INCLUDING FINGERS WITH INFECTION, SUBSEQUENT ENCOUNTER: Primary | ICD-10-CM
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
## 2024-06-17 DIAGNOSIS — L08.9 DOG BITE OF LEFT HAND INCLUDING FINGERS WITH INFECTION, SUBSEQUENT ENCOUNTER: Primary | ICD-10-CM

## 2024-06-17 DIAGNOSIS — S61.452D DOG BITE OF LEFT HAND INCLUDING FINGERS WITH INFECTION, SUBSEQUENT ENCOUNTER: Primary | ICD-10-CM

## 2024-06-17 PROCEDURE — G8428 CUR MEDS NOT DOCUMENT: HCPCS | Performed by: ORTHOPAEDIC SURGERY

## 2024-06-17 PROCEDURE — 1123F ACP DISCUSS/DSCN MKR DOCD: CPT | Performed by: ORTHOPAEDIC SURGERY

## 2024-06-17 PROCEDURE — G8417 CALC BMI ABV UP PARAM F/U: HCPCS | Performed by: ORTHOPAEDIC SURGERY

## 2024-06-17 PROCEDURE — 1036F TOBACCO NON-USER: CPT | Performed by: ORTHOPAEDIC SURGERY

## 2024-06-17 PROCEDURE — 99212 OFFICE O/P EST SF 10 MIN: CPT | Performed by: ORTHOPAEDIC SURGERY

## 2024-06-17 RX ORDER — EMPAGLIFLOZIN 25 MG/1
25 TABLET, FILM COATED ORAL DAILY
Qty: 90 TABLET | Refills: 0 | Status: SHIPPED | OUTPATIENT
Start: 2024-06-17

## 2024-06-17 NOTE — PROGRESS NOTES
Chon Graham  [id number]  Encounter Date: 6/17/2024  YOB: 1940    Chief Complaint   Patient presents with    Follow-up     Left Hand Dog Bite       History:Mr. Chon Graham is here in follow up regarding his left hand dog bite.  He reports no pain today.  He has been using the Hibiclens soap.  He had no more drainage from the wounds.  He will complete his oral antibiotics over the next 24 hours.  Overall, he feels much better.    Exam:  Ht 1.727 m (5' 8\")   Wt 93 kg (205 lb)   BMI 31.17 kg/m²   General: Alert and oriented x3, No acute distress, Cooperative and conversant.  Mood and affect are appropriate.  Left hand: Wound on the thumb and first webspace are healed over.  There is no residual erythema.  There is some small amount of swelling but nontender.  Full range of motion of the digits.  Left hand  strength intact.  Sensation to light touch grossly present throughout the left hand  Capillary refill < 2 seconds and palpable distal pulses    Assessment:   Diagnosis Orders   1. Dog bite of left hand including fingers with infection, subsequent encounter            Plan:  We discussed treatment options today.  Symptoms have resolved with conservative treatment.  He has full function of the hand.  He can discontinue scrubs with the Hibiclens.  He can follow back with me on an as-needed basis.  He will complete his antibiotics as prescribed.    He understands and accepts this course of care.      Paolo Brewer MD, FAAOS  Board Certified Orthopaedic Surgeon  [facility]  [place] & [place]    Phone:[phone]  Fax [phone]    06/17/24  11:00 AM    Documentation was done using voice recognition dragon software.  Every effort was made to ensure accuracy; however, inadvertent  Unintentional computerized transcription errors may be present.

## 2024-07-31 ENCOUNTER — TELEPHONE (OUTPATIENT)
Dept: ENDOCRINOLOGY | Age: 84
End: 2024-07-31

## 2024-08-01 NOTE — TELEPHONE ENCOUNTER
I spoke with patient.  Per patient report, he was instructed to stop his insulin for surgery.  Patient will discontinue insulin pump prior to surgery and resume afterwards.  
Please advise.    Spoke w/ pt. He is having left shoulder replacement  He will be on Anesthesia  Surgery length unknown by pt   Pt states he should be heading home same day.    He will NPO after midnight, surgery is sometime around 9AM. He has to be there for prep at 9AM  
Pt called again asking for a call back pt says he really needs to talk to Dr Hernandez going in for surgery tomorrow morning at 9am and needs to know how to manage insulin night before and morning of.   
Pt called in and said he has surgery this Friday morning and pcp told him to reach out to us to see how to manage insulin night before and morning of. Please advise   
PAST SURGICAL HISTORY:  History of creation of ostomy > 10 years, multiple abdominal surgeries in the past.

## 2024-08-17 DIAGNOSIS — E11.40 POORLY CONTROLLED TYPE 2 DIABETES MELLITUS WITH NEUROPATHY (HCC): ICD-10-CM

## 2024-08-17 DIAGNOSIS — E11.65 POORLY CONTROLLED TYPE 2 DIABETES MELLITUS WITH NEUROPATHY (HCC): ICD-10-CM

## 2024-08-19 ENCOUNTER — TELEPHONE (OUTPATIENT)
Dept: ENDOCRINOLOGY | Age: 84
End: 2024-08-19

## 2024-08-19 RX ORDER — EMPAGLIFLOZIN 25 MG/1
25 TABLET, FILM COATED ORAL DAILY
Qty: 90 TABLET | Refills: 0 | Status: SHIPPED | OUTPATIENT
Start: 2024-08-19

## 2024-08-23 DIAGNOSIS — N18.30 STAGE 3 CHRONIC KIDNEY DISEASE, UNSPECIFIED WHETHER STAGE 3A OR 3B CKD (HCC): ICD-10-CM

## 2024-08-23 DIAGNOSIS — E55.9 VITAMIN D DEFICIENCY: ICD-10-CM

## 2024-08-23 DIAGNOSIS — E06.3 HASHIMOTO'S THYROIDITIS: ICD-10-CM

## 2024-08-23 DIAGNOSIS — E11.42 DIABETIC POLYNEUROPATHY ASSOCIATED WITH TYPE 2 DIABETES MELLITUS (HCC): ICD-10-CM

## 2024-08-23 DIAGNOSIS — E03.9 ACQUIRED HYPOTHYROIDISM: ICD-10-CM

## 2024-08-23 DIAGNOSIS — E11.40 POORLY CONTROLLED TYPE 2 DIABETES MELLITUS WITH NEUROPATHY (HCC): ICD-10-CM

## 2024-08-23 DIAGNOSIS — I25.10 CORONARY ARTERY DISEASE INVOLVING NATIVE CORONARY ARTERY OF NATIVE HEART WITHOUT ANGINA PECTORIS: ICD-10-CM

## 2024-08-23 DIAGNOSIS — I10 PRIMARY HYPERTENSION: ICD-10-CM

## 2024-08-23 DIAGNOSIS — E11.65 POORLY CONTROLLED TYPE 2 DIABETES MELLITUS WITH NEUROPATHY (HCC): ICD-10-CM

## 2024-08-23 DIAGNOSIS — E78.2 MIXED HYPERLIPIDEMIA: ICD-10-CM

## 2024-08-23 LAB
25(OH)D3 SERPL-MCNC: 45.1 NG/ML
ALBUMIN SERPL-MCNC: 4.2 G/DL (ref 3.4–5)
ALBUMIN/GLOB SERPL: 1.6 {RATIO} (ref 1.1–2.2)
ALP SERPL-CCNC: 106 U/L (ref 40–129)
ALT SERPL-CCNC: 14 U/L (ref 10–40)
ANION GAP SERPL CALCULATED.3IONS-SCNC: 10 MMOL/L (ref 3–16)
AST SERPL-CCNC: 21 U/L (ref 15–37)
BILIRUB SERPL-MCNC: 0.5 MG/DL (ref 0–1)
BUN SERPL-MCNC: 15 MG/DL (ref 7–20)
CALCIUM SERPL-MCNC: 9.4 MG/DL (ref 8.3–10.6)
CHLORIDE SERPL-SCNC: 98 MMOL/L (ref 99–110)
CHOLEST SERPL-MCNC: 179 MG/DL (ref 0–199)
CO2 SERPL-SCNC: 31 MMOL/L (ref 21–32)
CREAT SERPL-MCNC: 1.2 MG/DL (ref 0.8–1.3)
CREAT UR-MCNC: 48.7 MG/DL (ref 39–259)
GFR SERPLBLD CREATININE-BSD FMLA CKD-EPI: 59 ML/MIN/{1.73_M2}
GLUCOSE SERPL-MCNC: 97 MG/DL (ref 70–99)
HDLC SERPL-MCNC: 48 MG/DL (ref 40–60)
LDL CHOLESTEROL: 90 MG/DL
MICROALBUMIN UR DL<=1MG/L-MCNC: 5.99 MG/DL
MICROALBUMIN/CREAT UR: 123 MG/G (ref 0–30)
POTASSIUM SERPL-SCNC: 4.4 MMOL/L (ref 3.5–5.1)
PROT SERPL-MCNC: 6.9 G/DL (ref 6.4–8.2)
SODIUM SERPL-SCNC: 139 MMOL/L (ref 136–145)
T4 FREE SERPL-MCNC: 1.2 NG/DL (ref 0.9–1.8)
TRIGL SERPL-MCNC: 203 MG/DL (ref 0–150)
TSH SERPL DL<=0.005 MIU/L-ACNC: 2.19 UIU/ML (ref 0.27–4.2)
VLDLC SERPL CALC-MCNC: 41 MG/DL

## 2024-08-24 LAB
EST. AVERAGE GLUCOSE BLD GHB EST-MCNC: 157.1 MG/DL
HBA1C MFR BLD: 7.1 %

## 2024-09-04 ENCOUNTER — OFFICE VISIT (OUTPATIENT)
Dept: ENDOCRINOLOGY | Age: 84
End: 2024-09-04
Payer: MEDICARE

## 2024-09-04 VITALS
WEIGHT: 206 LBS | OXYGEN SATURATION: 93 % | RESPIRATION RATE: 14 BRPM | TEMPERATURE: 98 F | BODY MASS INDEX: 31.22 KG/M2 | DIASTOLIC BLOOD PRESSURE: 86 MMHG | HEIGHT: 68 IN | SYSTOLIC BLOOD PRESSURE: 131 MMHG | HEART RATE: 64 BPM

## 2024-09-04 DIAGNOSIS — E66.9 CLASS 1 OBESITY WITH BODY MASS INDEX (BMI) OF 31.0 TO 31.9 IN ADULT, UNSPECIFIED OBESITY TYPE, UNSPECIFIED WHETHER SERIOUS COMORBIDITY PRESENT: ICD-10-CM

## 2024-09-04 DIAGNOSIS — E78.2 MIXED HYPERLIPIDEMIA: ICD-10-CM

## 2024-09-04 DIAGNOSIS — N18.30 STAGE 3 CHRONIC KIDNEY DISEASE, UNSPECIFIED WHETHER STAGE 3A OR 3B CKD (HCC): ICD-10-CM

## 2024-09-04 DIAGNOSIS — I10 PRIMARY HYPERTENSION: ICD-10-CM

## 2024-09-04 DIAGNOSIS — E55.9 VITAMIN D DEFICIENCY: ICD-10-CM

## 2024-09-04 DIAGNOSIS — E11.21 DIABETIC NEPHROPATHY ASSOCIATED WITH TYPE 2 DIABETES MELLITUS (HCC): ICD-10-CM

## 2024-09-04 DIAGNOSIS — I25.10 CORONARY ARTERY DISEASE INVOLVING NATIVE CORONARY ARTERY OF NATIVE HEART WITHOUT ANGINA PECTORIS: ICD-10-CM

## 2024-09-04 DIAGNOSIS — E11.40 POORLY CONTROLLED TYPE 2 DIABETES MELLITUS WITH NEUROPATHY (HCC): Primary | ICD-10-CM

## 2024-09-04 DIAGNOSIS — E11.42 DIABETIC POLYNEUROPATHY ASSOCIATED WITH TYPE 2 DIABETES MELLITUS (HCC): ICD-10-CM

## 2024-09-04 DIAGNOSIS — E06.3 HASHIMOTO'S THYROIDITIS: ICD-10-CM

## 2024-09-04 DIAGNOSIS — E03.9 ACQUIRED HYPOTHYROIDISM: ICD-10-CM

## 2024-09-04 DIAGNOSIS — E11.65 POORLY CONTROLLED TYPE 2 DIABETES MELLITUS WITH NEUROPATHY (HCC): Primary | ICD-10-CM

## 2024-09-04 PROCEDURE — 99214 OFFICE O/P EST MOD 30 MIN: CPT | Performed by: INTERNAL MEDICINE

## 2024-09-04 PROCEDURE — G8417 CALC BMI ABV UP PARAM F/U: HCPCS | Performed by: INTERNAL MEDICINE

## 2024-09-04 PROCEDURE — 1036F TOBACCO NON-USER: CPT | Performed by: INTERNAL MEDICINE

## 2024-09-04 PROCEDURE — 95251 CONT GLUC MNTR ANALYSIS I&R: CPT | Performed by: INTERNAL MEDICINE

## 2024-09-04 PROCEDURE — 3079F DIAST BP 80-89 MM HG: CPT | Performed by: INTERNAL MEDICINE

## 2024-09-04 PROCEDURE — 1123F ACP DISCUSS/DSCN MKR DOCD: CPT | Performed by: INTERNAL MEDICINE

## 2024-09-04 PROCEDURE — G8427 DOCREV CUR MEDS BY ELIG CLIN: HCPCS | Performed by: INTERNAL MEDICINE

## 2024-09-04 PROCEDURE — 3051F HG A1C>EQUAL 7.0%<8.0%: CPT | Performed by: INTERNAL MEDICINE

## 2024-09-04 PROCEDURE — 3075F SYST BP GE 130 - 139MM HG: CPT | Performed by: INTERNAL MEDICINE

## 2024-09-04 RX ORDER — GABAPENTIN 300 MG/1
900 CAPSULE ORAL 3 TIMES DAILY
Qty: 270 CAPSULE | Refills: 2 | Status: SHIPPED | OUTPATIENT
Start: 2024-09-04 | End: 2025-03-03

## 2024-09-04 RX ORDER — LEVOTHYROXINE SODIUM 125 UG/1
125 TABLET ORAL DAILY
Qty: 90 TABLET | Refills: 1 | Status: SHIPPED | OUTPATIENT
Start: 2024-09-04

## 2024-09-04 RX ORDER — ROSUVASTATIN CALCIUM 10 MG/1
10 TABLET, COATED ORAL DAILY
Qty: 30 TABLET | Refills: 3 | Status: SHIPPED | OUTPATIENT
Start: 2024-09-04

## 2024-09-04 NOTE — PROGRESS NOTES
Chon Graham is a 83 y.o. male who is being evaluated for Type 2 diabetes mellitus.     Current symptoms/problems include  burning, tingling and pain in his feet  and show no change.     Poorly controlled type 2 diabetes mellitus with neuropathy ([facility]) [E11.40, E11.65]    Diagnosed with Type 2 diabetes mellitus in 1979.     Comorbid conditions:  HTN, hyperlipidemia, Neuropathy, and Coronary Artery Disease  Patient complains of pain, numbness and tingling in his feet bilaterally.    Current diabetic medications include: Humulin R U-500 concentrated insulin  On [employer] 780/Guardian4    Intolerance to diabetes medications: No     Weight trend: stable  Prior visit with dietician: yes  Current diet: on average, 3 meals per day  Current exercise: walking, but unsteady on his feet     Current monitoring regimen: home blood tests - 4 times daily  Has brought blood glucose log/meter:  Yes  Home blood sugar records: fasting range: 100-140 and postprandial range: 100-150   Any episodes of hypoglycemia? Yes  Hypoglycemia frequency and time(s):  2 times weekly  Does patient have Glucagon emergency kit? Yes  Does patient have rapid acting carbohydrate?  Yes  Does patient wear a medic alert bracelet or necklace?  No    2. Coronary artery disease involving native coronary artery of native heart without angina pectoris  CABG 20 years ago  Patient denies chest pain  Sees cardiologist.    3. Primary hypertension  No headaches    4. Mixed hyperlipidemia  No muscle pain    5. Obesity  Eats healthy, active    6. Vitamin D deficiency  Has fatigue    7. Diabetic nephropathy associated with type 2 diabetes mellitus (HCC)  Patient has some urination problems.    8. Stage 3 chronic kidney disease, unspecified whether stage 3a or 3b CKD ([facility])  Has fatigue, urination problems.    9.  Diabetic polyneuropathy associated with type 2 diabetes mellitus  Patient complains of pain, numbness and tingling in his feet bilaterally.  Pain is

## 2024-10-23 ENCOUNTER — TELEPHONE (OUTPATIENT)
Dept: ENDOCRINOLOGY | Age: 84
End: 2024-10-23

## 2024-11-11 RX ORDER — ROSUVASTATIN CALCIUM 10 MG/1
10 TABLET, COATED ORAL DAILY
Qty: 90 TABLET | Refills: 0 | Status: SHIPPED | OUTPATIENT
Start: 2024-11-11

## 2024-11-20 DIAGNOSIS — E11.42 DIABETIC POLYNEUROPATHY ASSOCIATED WITH TYPE 2 DIABETES MELLITUS (HCC): ICD-10-CM

## 2024-11-21 RX ORDER — GABAPENTIN 300 MG/1
CAPSULE ORAL
Qty: 810 CAPSULE | Refills: 0 | Status: SHIPPED | OUTPATIENT
Start: 2024-11-21 | End: 2025-02-19

## 2024-11-21 NOTE — TELEPHONE ENCOUNTER
Medication:   Requested Prescriptions     Pending Prescriptions Disp Refills    gabapentin (NEURONTIN) 300 MG capsule [Pharmacy Med Name: GABAPENTIN CAP 300MG (NEUR)] 810 capsule 0     Sig: TAKE 3 CAPSULES BY MOUTH 3 TIMES DAILY       Last Filled:  9/4/24    Patient Phone Number: [phone] (home)     Last appt: 9/4/2024   Next appt: 12/18/2024    Last Labs DM:   Lab Results   Component Value Date/Time    LABA1C 7.1 08/23/2024 11:45 AM     Last Lipid:   Lab Results   Component Value Date/Time    HDL 48 08/23/2024 11:45 AM     Last PSA: No results found for: \"PSA\"  Last Thyroid:   Lab Results   Component Value Date/Time    TSH 2.19 08/23/2024 11:45 AM    T4FREE 1.2 08/23/2024 11:45 AM

## 2024-12-18 ENCOUNTER — OFFICE VISIT (OUTPATIENT)
Dept: ENDOCRINOLOGY | Age: 84
End: 2024-12-18
Payer: MEDICARE

## 2024-12-18 VITALS
TEMPERATURE: 98 F | RESPIRATION RATE: 14 BRPM | SYSTOLIC BLOOD PRESSURE: 151 MMHG | HEIGHT: 68 IN | BODY MASS INDEX: 31.83 KG/M2 | DIASTOLIC BLOOD PRESSURE: 84 MMHG | OXYGEN SATURATION: 95 % | WEIGHT: 210 LBS | HEART RATE: 72 BPM

## 2024-12-18 DIAGNOSIS — E11.40 POORLY CONTROLLED TYPE 2 DIABETES MELLITUS WITH NEUROPATHY (HCC): ICD-10-CM

## 2024-12-18 DIAGNOSIS — E11.65 POORLY CONTROLLED TYPE 2 DIABETES MELLITUS WITH NEUROPATHY (HCC): ICD-10-CM

## 2024-12-18 DIAGNOSIS — E55.9 VITAMIN D DEFICIENCY: ICD-10-CM

## 2024-12-18 DIAGNOSIS — E66.811 CLASS 1 OBESITY WITH BODY MASS INDEX (BMI) OF 31.0 TO 31.9 IN ADULT, UNSPECIFIED OBESITY TYPE, UNSPECIFIED WHETHER SERIOUS COMORBIDITY PRESENT: ICD-10-CM

## 2024-12-18 DIAGNOSIS — I25.10 CORONARY ARTERY DISEASE INVOLVING NATIVE CORONARY ARTERY OF NATIVE HEART WITHOUT ANGINA PECTORIS: ICD-10-CM

## 2024-12-18 DIAGNOSIS — E78.2 MIXED HYPERLIPIDEMIA: ICD-10-CM

## 2024-12-18 DIAGNOSIS — E11.21 DIABETIC NEPHROPATHY ASSOCIATED WITH TYPE 2 DIABETES MELLITUS (HCC): ICD-10-CM

## 2024-12-18 DIAGNOSIS — I10 PRIMARY HYPERTENSION: ICD-10-CM

## 2024-12-18 DIAGNOSIS — E06.3 HASHIMOTO'S THYROIDITIS: ICD-10-CM

## 2024-12-18 DIAGNOSIS — E03.9 ACQUIRED HYPOTHYROIDISM: ICD-10-CM

## 2024-12-18 DIAGNOSIS — E11.42 DIABETIC POLYNEUROPATHY ASSOCIATED WITH TYPE 2 DIABETES MELLITUS (HCC): ICD-10-CM

## 2024-12-18 DIAGNOSIS — E11.65 POORLY CONTROLLED TYPE 2 DIABETES MELLITUS WITH NEUROPATHY (HCC): Primary | ICD-10-CM

## 2024-12-18 DIAGNOSIS — N18.30 STAGE 3 CHRONIC KIDNEY DISEASE, UNSPECIFIED WHETHER STAGE 3A OR 3B CKD (HCC): ICD-10-CM

## 2024-12-18 DIAGNOSIS — E11.40 POORLY CONTROLLED TYPE 2 DIABETES MELLITUS WITH NEUROPATHY (HCC): Primary | ICD-10-CM

## 2024-12-18 LAB
25(OH)D3 SERPL-MCNC: 53.1 NG/ML
CREAT UR-MCNC: 48.7 MG/DL (ref 39–259)
MICROALBUMIN UR DL<=1MG/L-MCNC: 8.83 MG/DL
MICROALBUMIN/CREAT UR: 181.3 MG/G (ref 0–30)
T4 FREE SERPL-MCNC: 1.1 NG/DL (ref 0.9–1.8)
TSH SERPL DL<=0.005 MIU/L-ACNC: 4.03 UIU/ML (ref 0.27–4.2)

## 2024-12-18 PROCEDURE — 1160F RVW MEDS BY RX/DR IN RCRD: CPT | Performed by: INTERNAL MEDICINE

## 2024-12-18 PROCEDURE — 3051F HG A1C>EQUAL 7.0%<8.0%: CPT | Performed by: INTERNAL MEDICINE

## 2024-12-18 PROCEDURE — G8427 DOCREV CUR MEDS BY ELIG CLIN: HCPCS | Performed by: INTERNAL MEDICINE

## 2024-12-18 PROCEDURE — 3077F SYST BP >= 140 MM HG: CPT | Performed by: INTERNAL MEDICINE

## 2024-12-18 PROCEDURE — 3079F DIAST BP 80-89 MM HG: CPT | Performed by: INTERNAL MEDICINE

## 2024-12-18 PROCEDURE — G8417 CALC BMI ABV UP PARAM F/U: HCPCS | Performed by: INTERNAL MEDICINE

## 2024-12-18 PROCEDURE — G8484 FLU IMMUNIZE NO ADMIN: HCPCS | Performed by: INTERNAL MEDICINE

## 2024-12-18 PROCEDURE — 1036F TOBACCO NON-USER: CPT | Performed by: INTERNAL MEDICINE

## 2024-12-18 PROCEDURE — 1159F MED LIST DOCD IN RCRD: CPT | Performed by: INTERNAL MEDICINE

## 2024-12-18 PROCEDURE — 95251 CONT GLUC MNTR ANALYSIS I&R: CPT | Performed by: INTERNAL MEDICINE

## 2024-12-18 PROCEDURE — 99214 OFFICE O/P EST MOD 30 MIN: CPT | Performed by: INTERNAL MEDICINE

## 2024-12-18 PROCEDURE — 1123F ACP DISCUSS/DSCN MKR DOCD: CPT | Performed by: INTERNAL MEDICINE

## 2024-12-18 RX ORDER — ROSUVASTATIN CALCIUM 10 MG/1
10 TABLET, COATED ORAL DAILY
Qty: 90 TABLET | Refills: 1 | Status: SHIPPED | OUTPATIENT
Start: 2024-12-18

## 2024-12-18 RX ORDER — ERGOCALCIFEROL 1.25 MG/1
50000 CAPSULE, LIQUID FILLED ORAL WEEKLY
Qty: 13 CAPSULE | Refills: 1 | Status: SHIPPED | OUTPATIENT
Start: 2024-12-18

## 2024-12-18 RX ORDER — GABAPENTIN 300 MG/1
900 CAPSULE ORAL 3 TIMES DAILY
Qty: 810 CAPSULE | Refills: 0 | Status: SHIPPED | OUTPATIENT
Start: 2024-12-18 | End: 2025-03-18

## 2024-12-18 RX ORDER — LEVOTHYROXINE SODIUM 125 UG/1
125 TABLET ORAL DAILY
Qty: 90 TABLET | Refills: 1 | Status: SHIPPED | OUTPATIENT
Start: 2024-12-18

## 2024-12-18 NOTE — PROGRESS NOTES
decrease basal rates.  Advised to increase target blood glucose.  Continue Humulin R U-500 concentrated insulin and insulin pump  If no improvement in hypoglycemia, then change Humulin R U-500 concentrated insulin to Humalog U-100 insulin.    Jardiance 25 mg daily.   Lyrica caused sleepiness.  On Gabapentin 300 mg 3 capsules tid  Hemoglobin A1c 8.2-7.9-8.1-8.0-7.5-7.1    - Comprehensive Metabolic Panel; Future  - Hemoglobin A1C; Future  - Lipid, Fasting; Future  - Microalbumin / Creatinine Urine Ratio; Future    2. Coronary artery disease involving native coronary artery of native heart without angina pectoris  Follow-up with cardiology  - Comprehensive Metabolic Panel; Future  - Lipid, Fasting; Future    3. Primary hypertension  Continue amlodipine 2.5 mg daily  Continue losartan-hydrochlorothiazide 100-12.5 mg daily  Furosemide 20 mg daily  - Comprehensive Metabolic Panel; Future    4. Mixed hyperlipidemia  LDL -50-90  HDL 43-45-45-48  Triglyceride [phone]  Statins caused muscle pain in the past, but he does not remember details.  Continue rosuvastatin 10 mg daily.  Let me know if any side effects.  Continue ezetimibe 10 mg daily  - Comprehensive Metabolic Panel; Future  - Lipid, Fasting; Future    5. Obesity  Healthy diet, activity as tolerated    6. Vitamin D deficiency  25OHvitamin D 30.9-50-49.0-45.1  Vitamin D 50,000 IU weekly  25-hydroxy vitamin D 18-30.9-50.1  - Comprehensive Metabolic Panel; Future  - Vitamin D 25 Hydroxy; Future    7. Diabetic nephropathy associated with type 2 diabetes mellitus (HCC)  Microalbumin creatinine ratio 370.1-421.9-237.2-123  Continue losartan  Jardiance 25 mg daily.  - Comprehensive Metabolic Panel; Future  - Microalbumin / Creatinine Urine Ratio; Future    8. Stage 3 chronic kidney disease, unspecified whether stage 3a or 3b CKD ([facility])  Creatinine 1.5-1.4-1.2-1.2-1.1-1.2-1.2  GFR 30-79-79-96-42-82-59-59  Continue losartan  Jardiance 25 mg daily.  - Comprehensive

## 2024-12-31 ENCOUNTER — TELEPHONE (OUTPATIENT)
Dept: ENDOCRINOLOGY | Age: 84
End: 2024-12-31

## 2025-01-01 NOTE — TELEPHONE ENCOUNTER
Please inform patient: Microalbumin creatinine ratio slightly higher again.  Still remains below 300.  Same medications, Jardiance and losartan should be protective.  Vitamin D and thyroid function is good.  The lab did not do lipid panel, metabolic panel and hemoglobin A1c.  The labs that are in the epic, unclear why.  Ask patient if he would like to get them done or wait until next appointment.  If he does, please reorder to avoid lab confusion again.

## 2025-01-02 NOTE — TELEPHONE ENCOUNTER
Pt called back and read message verbatim. Pt understood and said he would like all of that sent to him in a [employer] message please

## 2025-01-02 NOTE — TELEPHONE ENCOUNTER
LVM to return call    Please inform patient: Microalbumin creatinine ratio slightly higher again.  Still remains below 300.  Same medications, Jardiance and losartan should be protective.  Vitamin D and thyroid function is good.  The lab did not do lipid panel, metabolic panel and hemoglobin A1c.  The labs that are in the epic, unclear why.  Ask patient if he would like to get them done or wait until next appointment.  If he does, please reorder to avoid lab confusion again.

## 2025-02-21 DIAGNOSIS — E11.65 POORLY CONTROLLED TYPE 2 DIABETES MELLITUS WITH NEUROPATHY (HCC): ICD-10-CM

## 2025-02-21 DIAGNOSIS — I10 PRIMARY HYPERTENSION: ICD-10-CM

## 2025-02-21 DIAGNOSIS — E11.42 DIABETIC POLYNEUROPATHY ASSOCIATED WITH TYPE 2 DIABETES MELLITUS (HCC): ICD-10-CM

## 2025-02-21 DIAGNOSIS — E11.40 POORLY CONTROLLED TYPE 2 DIABETES MELLITUS WITH NEUROPATHY (HCC): ICD-10-CM

## 2025-02-21 DIAGNOSIS — E03.9 ACQUIRED HYPOTHYROIDISM: ICD-10-CM

## 2025-02-21 DIAGNOSIS — E78.2 MIXED HYPERLIPIDEMIA: ICD-10-CM

## 2025-02-21 DIAGNOSIS — E06.3 HASHIMOTO'S THYROIDITIS: ICD-10-CM

## 2025-02-21 DIAGNOSIS — N18.30 STAGE 3 CHRONIC KIDNEY DISEASE, UNSPECIFIED WHETHER STAGE 3A OR 3B CKD (HCC): ICD-10-CM

## 2025-02-21 DIAGNOSIS — E55.9 VITAMIN D DEFICIENCY: ICD-10-CM

## 2025-02-21 DIAGNOSIS — I25.10 CORONARY ARTERY DISEASE INVOLVING NATIVE CORONARY ARTERY OF NATIVE HEART WITHOUT ANGINA PECTORIS: ICD-10-CM

## 2025-02-21 RX ORDER — BLOOD SUGAR DIAGNOSTIC
STRIP MISCELLANEOUS
Qty: 400 STRIP | Refills: 0 | Status: SHIPPED | OUTPATIENT
Start: 2025-02-21

## 2025-03-25 ENCOUNTER — TELEPHONE (OUTPATIENT)
Dept: ENDOCRINOLOGY | Age: 85
End: 2025-03-25

## 2025-04-04 DIAGNOSIS — E11.21 DIABETIC NEPHROPATHY ASSOCIATED WITH TYPE 2 DIABETES MELLITUS: ICD-10-CM

## 2025-04-04 DIAGNOSIS — E78.2 MIXED HYPERLIPIDEMIA: ICD-10-CM

## 2025-04-04 DIAGNOSIS — E06.3 HASHIMOTO'S THYROIDITIS: ICD-10-CM

## 2025-04-04 DIAGNOSIS — E11.65 POORLY CONTROLLED TYPE 2 DIABETES MELLITUS WITH NEUROPATHY (HCC): ICD-10-CM

## 2025-04-04 DIAGNOSIS — E55.9 VITAMIN D DEFICIENCY: ICD-10-CM

## 2025-04-04 DIAGNOSIS — E11.42 DIABETIC POLYNEUROPATHY ASSOCIATED WITH TYPE 2 DIABETES MELLITUS: ICD-10-CM

## 2025-04-04 DIAGNOSIS — I10 PRIMARY HYPERTENSION: ICD-10-CM

## 2025-04-04 DIAGNOSIS — E11.40 POORLY CONTROLLED TYPE 2 DIABETES MELLITUS WITH NEUROPATHY (HCC): ICD-10-CM

## 2025-04-04 DIAGNOSIS — E03.9 ACQUIRED HYPOTHYROIDISM: ICD-10-CM

## 2025-04-04 DIAGNOSIS — I25.10 CORONARY ARTERY DISEASE INVOLVING NATIVE CORONARY ARTERY OF NATIVE HEART WITHOUT ANGINA PECTORIS: ICD-10-CM

## 2025-04-05 LAB
25(OH)D3 SERPL-MCNC: 38.3 NG/ML
ALBUMIN SERPL-MCNC: 4.3 G/DL (ref 3.4–5)
ALBUMIN/GLOB SERPL: 1.7 {RATIO} (ref 1.1–2.2)
ALP SERPL-CCNC: 73 U/L (ref 40–129)
ALT SERPL-CCNC: 35 U/L (ref 10–40)
ANION GAP SERPL CALCULATED.3IONS-SCNC: 7 MMOL/L (ref 3–16)
AST SERPL-CCNC: 30 U/L (ref 15–37)
BILIRUB SERPL-MCNC: 0.5 MG/DL (ref 0–1)
BUN SERPL-MCNC: 26 MG/DL (ref 7–20)
CALCIUM SERPL-MCNC: 9.5 MG/DL (ref 8.3–10.6)
CHLORIDE SERPL-SCNC: 99 MMOL/L (ref 99–110)
CHOLEST SERPL-MCNC: 129 MG/DL (ref 0–199)
CO2 SERPL-SCNC: 33 MMOL/L (ref 21–32)
CREAT SERPL-MCNC: 1.4 MG/DL (ref 0.8–1.3)
CREAT UR-MCNC: 94.3 MG/DL (ref 39–259)
EST. AVERAGE GLUCOSE BLD GHB EST-MCNC: 177.2 MG/DL
GFR SERPLBLD CREATININE-BSD FMLA CKD-EPI: 49 ML/MIN/{1.73_M2}
GLUCOSE SERPL-MCNC: 95 MG/DL (ref 70–99)
HBA1C MFR BLD: 7.8 %
HDLC SERPL-MCNC: 65 MG/DL (ref 40–60)
LDL CHOLESTEROL: 49 MG/DL
MICROALBUMIN UR DL<=1MG/L-MCNC: 26.2 MG/DL
MICROALBUMIN/CREAT UR: 277.8 MG/G (ref 0–30)
POTASSIUM SERPL-SCNC: 4.8 MMOL/L (ref 3.5–5.1)
PROT SERPL-MCNC: 6.8 G/DL (ref 6.4–8.2)
SODIUM SERPL-SCNC: 139 MMOL/L (ref 136–145)
T4 FREE SERPL-MCNC: 1.1 NG/DL (ref 0.9–1.8)
TRIGL SERPL-MCNC: 75 MG/DL (ref 0–150)
TSH SERPL DL<=0.005 MIU/L-ACNC: 7.34 UIU/ML (ref 0.27–4.2)
VLDLC SERPL CALC-MCNC: 15 MG/DL

## 2025-04-09 ENCOUNTER — OFFICE VISIT (OUTPATIENT)
Dept: ENDOCRINOLOGY | Age: 85
End: 2025-04-09
Payer: MEDICARE

## 2025-04-09 VITALS
WEIGHT: 210 LBS | DIASTOLIC BLOOD PRESSURE: 81 MMHG | HEART RATE: 86 BPM | RESPIRATION RATE: 14 BRPM | BODY MASS INDEX: 31.83 KG/M2 | HEIGHT: 68 IN | SYSTOLIC BLOOD PRESSURE: 160 MMHG | TEMPERATURE: 98 F | OXYGEN SATURATION: 95 %

## 2025-04-09 DIAGNOSIS — N18.30 STAGE 3 CHRONIC KIDNEY DISEASE, UNSPECIFIED WHETHER STAGE 3A OR 3B CKD (HCC): ICD-10-CM

## 2025-04-09 DIAGNOSIS — I10 PRIMARY HYPERTENSION: ICD-10-CM

## 2025-04-09 DIAGNOSIS — E11.21 DIABETIC NEPHROPATHY ASSOCIATED WITH TYPE 2 DIABETES MELLITUS: ICD-10-CM

## 2025-04-09 DIAGNOSIS — E11.65 POORLY CONTROLLED TYPE 2 DIABETES MELLITUS WITH NEUROPATHY (HCC): Primary | ICD-10-CM

## 2025-04-09 DIAGNOSIS — E03.9 ACQUIRED HYPOTHYROIDISM: ICD-10-CM

## 2025-04-09 DIAGNOSIS — E78.2 MIXED HYPERLIPIDEMIA: ICD-10-CM

## 2025-04-09 DIAGNOSIS — E55.9 VITAMIN D DEFICIENCY: ICD-10-CM

## 2025-04-09 DIAGNOSIS — E11.42 DIABETIC POLYNEUROPATHY ASSOCIATED WITH TYPE 2 DIABETES MELLITUS: ICD-10-CM

## 2025-04-09 DIAGNOSIS — I25.10 CORONARY ARTERY DISEASE INVOLVING NATIVE CORONARY ARTERY OF NATIVE HEART WITHOUT ANGINA PECTORIS: ICD-10-CM

## 2025-04-09 DIAGNOSIS — E06.3 HASHIMOTO'S THYROIDITIS: ICD-10-CM

## 2025-04-09 DIAGNOSIS — E11.40 POORLY CONTROLLED TYPE 2 DIABETES MELLITUS WITH NEUROPATHY (HCC): Primary | ICD-10-CM

## 2025-04-09 DIAGNOSIS — E66.811 CLASS 1 OBESITY WITH SERIOUS COMORBIDITY AND BODY MASS INDEX (BMI) OF 32.0 TO 32.9 IN ADULT, UNSPECIFIED OBESITY TYPE: ICD-10-CM

## 2025-04-09 PROCEDURE — 1160F RVW MEDS BY RX/DR IN RCRD: CPT | Performed by: INTERNAL MEDICINE

## 2025-04-09 PROCEDURE — 99214 OFFICE O/P EST MOD 30 MIN: CPT | Performed by: INTERNAL MEDICINE

## 2025-04-09 PROCEDURE — 1036F TOBACCO NON-USER: CPT | Performed by: INTERNAL MEDICINE

## 2025-04-09 PROCEDURE — 3051F HG A1C>EQUAL 7.0%<8.0%: CPT | Performed by: INTERNAL MEDICINE

## 2025-04-09 PROCEDURE — G8417 CALC BMI ABV UP PARAM F/U: HCPCS | Performed by: INTERNAL MEDICINE

## 2025-04-09 PROCEDURE — 1159F MED LIST DOCD IN RCRD: CPT | Performed by: INTERNAL MEDICINE

## 2025-04-09 PROCEDURE — G8427 DOCREV CUR MEDS BY ELIG CLIN: HCPCS | Performed by: INTERNAL MEDICINE

## 2025-04-09 PROCEDURE — 1123F ACP DISCUSS/DSCN MKR DOCD: CPT | Performed by: INTERNAL MEDICINE

## 2025-04-09 PROCEDURE — 95251 CONT GLUC MNTR ANALYSIS I&R: CPT | Performed by: INTERNAL MEDICINE

## 2025-04-09 PROCEDURE — 3077F SYST BP >= 140 MM HG: CPT | Performed by: INTERNAL MEDICINE

## 2025-04-09 PROCEDURE — 3079F DIAST BP 80-89 MM HG: CPT | Performed by: INTERNAL MEDICINE

## 2025-04-09 NOTE — PROGRESS NOTES
severe and there is no improvement on gabapentin.    10. Hypothyroidism  Dx in 2020.  Has dizziness    11.  Hashimoto's thyroiditis  Has fatigue      Past Medical History:   Diagnosis Date    Anesthesia     Reports waking up during back and heart surgeries    Arthritis     Blood transfusion     10 years ago    CAD (coronary artery disease)     Cancer (HCC) ear skin    Cataract     Diabetes     Heart disease     High blood pressure     Hx of blood clots     LLE 40 yrs ago    Hyperlipidemia     Sleep apnea     no machine    Thyroid disease     Vascular disease       Patient Active Problem List   Diagnosis    Flu    Bright red rectal bleeding    GIB (gastrointestinal bleeding)    GI bleed    Anemia, blood loss    Acute respiratory failure with hypoxia    Poorly controlled type 2 diabetes mellitus with neuropathy (HCC)    CAD (coronary artery disease)    Primary hypertension    Mixed hyperlipidemia    Class 1 obesity with body mass index (BMI) of 32.0 to 32.9 in adult    Vitamin D deficiency    Diabetic nephropathy associated with type 2 diabetes mellitus    CKD (chronic kidney disease)    Diabetic polyneuropathy associated with type 2 diabetes mellitus    Class 1 obesity with body mass index (BMI) of 31.0 to 31.9 in adult    Acquired hypothyroidism    Hashimoto's thyroiditis     Past Surgical History:   Procedure Laterality Date    ABDOMEN SURGERY      Bowel surgery where a portion of his bowel was removed    APPENDECTOMY      BACK SURGERY      twice    CARDIAC SURGERY      bypass x4    CATARACT REMOVAL WITH IMPLANT  2011    right eye    CHOLECYSTECTOMY, LAPAROSCOPIC  12/05/2013    w/cholangiogram    COLONOSCOPY N/A 05/06/2019    COLONOSCOPY WITH BIOPSY performed by Duane FELTON MD at [facility] ENDOSCOPY    COLONOSCOPY N/A 05/18/2020    COLONOSCOPY WITH BIOPSY performed by Duane FELTON MD at [facility] ENDOSCOPY    COLONOSCOPY N/A 07/24/2023    COLONOSCOPY POLYPECTOMY SNARE/COLD BIOPSY performed by Duane

## 2025-05-02 DIAGNOSIS — E55.9 VITAMIN D DEFICIENCY: ICD-10-CM

## 2025-05-02 DIAGNOSIS — E06.3 HASHIMOTO'S THYROIDITIS: ICD-10-CM

## 2025-05-02 DIAGNOSIS — E11.42 DIABETIC POLYNEUROPATHY ASSOCIATED WITH TYPE 2 DIABETES MELLITUS (HCC): ICD-10-CM

## 2025-05-02 DIAGNOSIS — E11.40 POORLY CONTROLLED TYPE 2 DIABETES MELLITUS WITH NEUROPATHY (HCC): ICD-10-CM

## 2025-05-02 DIAGNOSIS — I10 PRIMARY HYPERTENSION: ICD-10-CM

## 2025-05-02 DIAGNOSIS — N18.30 STAGE 3 CHRONIC KIDNEY DISEASE, UNSPECIFIED WHETHER STAGE 3A OR 3B CKD (HCC): ICD-10-CM

## 2025-05-02 DIAGNOSIS — E11.65 POORLY CONTROLLED TYPE 2 DIABETES MELLITUS WITH NEUROPATHY (HCC): ICD-10-CM

## 2025-05-02 DIAGNOSIS — E78.2 MIXED HYPERLIPIDEMIA: ICD-10-CM

## 2025-05-02 DIAGNOSIS — I25.10 CORONARY ARTERY DISEASE INVOLVING NATIVE CORONARY ARTERY OF NATIVE HEART WITHOUT ANGINA PECTORIS: ICD-10-CM

## 2025-05-02 DIAGNOSIS — E03.9 ACQUIRED HYPOTHYROIDISM: ICD-10-CM

## 2025-05-05 DIAGNOSIS — E11.42 DIABETIC POLYNEUROPATHY ASSOCIATED WITH TYPE 2 DIABETES MELLITUS (HCC): ICD-10-CM

## 2025-05-05 RX ORDER — BLOOD SUGAR DIAGNOSTIC
STRIP MISCELLANEOUS
Qty: 400 STRIP | Refills: 1 | Status: SHIPPED | OUTPATIENT
Start: 2025-05-05

## 2025-05-06 RX ORDER — GABAPENTIN 300 MG/1
CAPSULE ORAL
Qty: 810 CAPSULE | Refills: 0 | Status: SHIPPED | OUTPATIENT
Start: 2025-05-06 | End: 2025-08-06

## 2025-06-05 DIAGNOSIS — E11.42 DIABETIC POLYNEUROPATHY ASSOCIATED WITH TYPE 2 DIABETES MELLITUS (HCC): ICD-10-CM

## 2025-06-05 DIAGNOSIS — E78.2 MIXED HYPERLIPIDEMIA: ICD-10-CM

## 2025-06-05 DIAGNOSIS — E06.3 HASHIMOTO'S THYROIDITIS: ICD-10-CM

## 2025-06-05 DIAGNOSIS — E55.9 VITAMIN D DEFICIENCY: ICD-10-CM

## 2025-06-05 DIAGNOSIS — I10 PRIMARY HYPERTENSION: ICD-10-CM

## 2025-06-05 DIAGNOSIS — N18.30 STAGE 3 CHRONIC KIDNEY DISEASE, UNSPECIFIED WHETHER STAGE 3A OR 3B CKD (HCC): ICD-10-CM

## 2025-06-05 DIAGNOSIS — E03.9 ACQUIRED HYPOTHYROIDISM: ICD-10-CM

## 2025-06-05 DIAGNOSIS — E11.40 POORLY CONTROLLED TYPE 2 DIABETES MELLITUS WITH NEUROPATHY (HCC): ICD-10-CM

## 2025-06-05 DIAGNOSIS — E11.65 POORLY CONTROLLED TYPE 2 DIABETES MELLITUS WITH NEUROPATHY (HCC): ICD-10-CM

## 2025-06-05 DIAGNOSIS — I25.10 CORONARY ARTERY DISEASE INVOLVING NATIVE CORONARY ARTERY OF NATIVE HEART WITHOUT ANGINA PECTORIS: ICD-10-CM

## 2025-06-06 RX ORDER — LEVOTHYROXINE SODIUM 125 UG/1
125 TABLET ORAL DAILY
Qty: 90 TABLET | Refills: 0 | Status: SHIPPED | OUTPATIENT
Start: 2025-06-06

## 2025-06-06 RX ORDER — ERGOCALCIFEROL 1.25 MG/1
CAPSULE, LIQUID FILLED ORAL
Qty: 13 CAPSULE | Refills: 0 | Status: SHIPPED | OUTPATIENT
Start: 2025-06-06

## 2025-07-07 DIAGNOSIS — I10 PRIMARY HYPERTENSION: ICD-10-CM

## 2025-07-07 DIAGNOSIS — E11.21 DIABETIC NEPHROPATHY ASSOCIATED WITH TYPE 2 DIABETES MELLITUS (HCC): ICD-10-CM

## 2025-07-07 DIAGNOSIS — E11.42 DIABETIC POLYNEUROPATHY ASSOCIATED WITH TYPE 2 DIABETES MELLITUS (HCC): ICD-10-CM

## 2025-07-07 DIAGNOSIS — E11.65 POORLY CONTROLLED TYPE 2 DIABETES MELLITUS WITH NEUROPATHY (HCC): ICD-10-CM

## 2025-07-07 DIAGNOSIS — E06.3 HASHIMOTO'S THYROIDITIS: ICD-10-CM

## 2025-07-07 DIAGNOSIS — E11.40 POORLY CONTROLLED TYPE 2 DIABETES MELLITUS WITH NEUROPATHY (HCC): ICD-10-CM

## 2025-07-07 DIAGNOSIS — I25.10 CORONARY ARTERY DISEASE INVOLVING NATIVE CORONARY ARTERY OF NATIVE HEART WITHOUT ANGINA PECTORIS: ICD-10-CM

## 2025-07-07 DIAGNOSIS — E55.9 VITAMIN D DEFICIENCY: ICD-10-CM

## 2025-07-07 DIAGNOSIS — E03.9 ACQUIRED HYPOTHYROIDISM: ICD-10-CM

## 2025-07-07 DIAGNOSIS — E78.2 MIXED HYPERLIPIDEMIA: ICD-10-CM

## 2025-07-08 LAB
25(OH)D3 SERPL-MCNC: 32.6 NG/ML
ALBUMIN SERPL-MCNC: 4.2 G/DL (ref 3.4–5)
ALBUMIN/GLOB SERPL: 1.6 {RATIO} (ref 1.1–2.2)
ALP SERPL-CCNC: 76 U/L (ref 40–129)
ALT SERPL-CCNC: 17 U/L (ref 10–40)
ANION GAP SERPL CALCULATED.3IONS-SCNC: 11 MMOL/L (ref 3–16)
AST SERPL-CCNC: 23 U/L (ref 15–37)
BILIRUB SERPL-MCNC: 0.6 MG/DL (ref 0–1)
BUN SERPL-MCNC: 17 MG/DL (ref 7–20)
CALCIUM SERPL-MCNC: 9.8 MG/DL (ref 8.3–10.6)
CHLORIDE SERPL-SCNC: 98 MMOL/L (ref 99–110)
CHOLEST SERPL-MCNC: 214 MG/DL (ref 0–199)
CO2 SERPL-SCNC: 31 MMOL/L (ref 21–32)
CREAT SERPL-MCNC: 1.2 MG/DL (ref 0.8–1.3)
CREAT UR-MCNC: 126 MG/DL (ref 39–259)
EST. AVERAGE GLUCOSE BLD GHB EST-MCNC: 162.8 MG/DL
GFR SERPLBLD CREATININE-BSD FMLA CKD-EPI: 59 ML/MIN/{1.73_M2}
GLUCOSE SERPL-MCNC: 131 MG/DL (ref 70–99)
HBA1C MFR BLD: 7.3 %
HDLC SERPL-MCNC: 45 MG/DL (ref 40–60)
LDL CHOLESTEROL: 117 MG/DL
MICROALBUMIN UR DL<=1MG/L-MCNC: 31.1 MG/DL
MICROALBUMIN/CREAT UR: 246.8 MG/G (ref 0–30)
POTASSIUM SERPL-SCNC: 4.5 MMOL/L (ref 3.5–5.1)
PROT SERPL-MCNC: 6.8 G/DL (ref 6.4–8.2)
SODIUM SERPL-SCNC: 140 MMOL/L (ref 136–145)
T4 FREE SERPL-MCNC: 1.1 NG/DL (ref 0.9–1.8)
TRIGL SERPL-MCNC: 260 MG/DL (ref 0–150)
TSH SERPL DL<=0.005 MIU/L-ACNC: 6.27 UIU/ML (ref 0.27–4.2)
VLDLC SERPL CALC-MCNC: 52 MG/DL

## 2025-07-08 RX ORDER — GABAPENTIN 300 MG/1
CAPSULE ORAL
Qty: 810 CAPSULE | Refills: 3 | OUTPATIENT
Start: 2025-07-08 | End: 2025-10-08

## 2025-07-08 RX ORDER — EMPAGLIFLOZIN 25 MG/1
25 TABLET, FILM COATED ORAL DAILY
Qty: 90 TABLET | Refills: 3 | OUTPATIENT
Start: 2025-07-08

## 2025-07-09 ENCOUNTER — TELEPHONE (OUTPATIENT)
Dept: ENDOCRINOLOGY | Age: 85
End: 2025-07-09

## 2025-07-09 ENCOUNTER — OFFICE VISIT (OUTPATIENT)
Dept: ENDOCRINOLOGY | Age: 85
End: 2025-07-09
Payer: MEDICARE

## 2025-07-09 VITALS
SYSTOLIC BLOOD PRESSURE: 151 MMHG | HEART RATE: 66 BPM | RESPIRATION RATE: 14 BRPM | OXYGEN SATURATION: 95 % | TEMPERATURE: 98 F | BODY MASS INDEX: 33.04 KG/M2 | HEIGHT: 68 IN | WEIGHT: 218 LBS | DIASTOLIC BLOOD PRESSURE: 62 MMHG

## 2025-07-09 DIAGNOSIS — E11.21 DIABETIC NEPHROPATHY ASSOCIATED WITH TYPE 2 DIABETES MELLITUS (HCC): ICD-10-CM

## 2025-07-09 DIAGNOSIS — E06.3 HASHIMOTO'S THYROIDITIS: ICD-10-CM

## 2025-07-09 DIAGNOSIS — E66.811 CLASS 1 OBESITY WITH SERIOUS COMORBIDITY AND BODY MASS INDEX (BMI) OF 33.0 TO 33.9 IN ADULT, UNSPECIFIED OBESITY TYPE: ICD-10-CM

## 2025-07-09 DIAGNOSIS — E11.40 POORLY CONTROLLED TYPE 2 DIABETES MELLITUS WITH NEUROPATHY (HCC): Primary | ICD-10-CM

## 2025-07-09 DIAGNOSIS — E11.42 DIABETIC POLYNEUROPATHY ASSOCIATED WITH TYPE 2 DIABETES MELLITUS (HCC): ICD-10-CM

## 2025-07-09 DIAGNOSIS — E11.65 POORLY CONTROLLED TYPE 2 DIABETES MELLITUS WITH NEUROPATHY (HCC): Primary | ICD-10-CM

## 2025-07-09 DIAGNOSIS — N18.30 STAGE 3 CHRONIC KIDNEY DISEASE, UNSPECIFIED WHETHER STAGE 3A OR 3B CKD (HCC): ICD-10-CM

## 2025-07-09 DIAGNOSIS — I10 PRIMARY HYPERTENSION: ICD-10-CM

## 2025-07-09 DIAGNOSIS — I25.10 CORONARY ARTERY DISEASE INVOLVING NATIVE CORONARY ARTERY OF NATIVE HEART WITHOUT ANGINA PECTORIS: ICD-10-CM

## 2025-07-09 DIAGNOSIS — E55.9 VITAMIN D DEFICIENCY: ICD-10-CM

## 2025-07-09 DIAGNOSIS — E03.9 ACQUIRED HYPOTHYROIDISM: ICD-10-CM

## 2025-07-09 DIAGNOSIS — E78.2 MIXED HYPERLIPIDEMIA: ICD-10-CM

## 2025-07-09 PROCEDURE — 3051F HG A1C>EQUAL 7.0%<8.0%: CPT | Performed by: INTERNAL MEDICINE

## 2025-07-09 PROCEDURE — 1123F ACP DISCUSS/DSCN MKR DOCD: CPT | Performed by: INTERNAL MEDICINE

## 2025-07-09 PROCEDURE — 1160F RVW MEDS BY RX/DR IN RCRD: CPT | Performed by: INTERNAL MEDICINE

## 2025-07-09 PROCEDURE — 3078F DIAST BP <80 MM HG: CPT | Performed by: INTERNAL MEDICINE

## 2025-07-09 PROCEDURE — G8427 DOCREV CUR MEDS BY ELIG CLIN: HCPCS | Performed by: INTERNAL MEDICINE

## 2025-07-09 PROCEDURE — 1159F MED LIST DOCD IN RCRD: CPT | Performed by: INTERNAL MEDICINE

## 2025-07-09 PROCEDURE — 95251 CONT GLUC MNTR ANALYSIS I&R: CPT | Performed by: INTERNAL MEDICINE

## 2025-07-09 PROCEDURE — 99214 OFFICE O/P EST MOD 30 MIN: CPT | Performed by: INTERNAL MEDICINE

## 2025-07-09 PROCEDURE — 1036F TOBACCO NON-USER: CPT | Performed by: INTERNAL MEDICINE

## 2025-07-09 PROCEDURE — 3077F SYST BP >= 140 MM HG: CPT | Performed by: INTERNAL MEDICINE

## 2025-07-09 PROCEDURE — G8417 CALC BMI ABV UP PARAM F/U: HCPCS | Performed by: INTERNAL MEDICINE

## 2025-07-09 RX ORDER — ERGOCALCIFEROL 1.25 MG/1
50000 CAPSULE, LIQUID FILLED ORAL WEEKLY
Qty: 13 CAPSULE | Refills: 1 | Status: SHIPPED | OUTPATIENT
Start: 2025-07-09

## 2025-07-09 RX ORDER — INSULIN LISPRO 100 [IU]/ML
INJECTION, SOLUTION INTRAVENOUS; SUBCUTANEOUS
Qty: 40 ML | Refills: 3 | Status: SHIPPED | OUTPATIENT
Start: 2025-07-09

## 2025-07-09 RX ORDER — GABAPENTIN 300 MG/1
CAPSULE ORAL
Qty: 810 CAPSULE | Refills: 1 | Status: SHIPPED | OUTPATIENT
Start: 2025-07-09 | End: 2025-08-09

## 2025-07-09 RX ORDER — ROSUVASTATIN CALCIUM 10 MG/1
10 TABLET, COATED ORAL DAILY
Qty: 90 TABLET | Refills: 1 | Status: SHIPPED | OUTPATIENT
Start: 2025-07-09

## 2025-07-09 RX ORDER — LEVOTHYROXINE SODIUM 137 UG/1
137 TABLET ORAL DAILY
Qty: 90 TABLET | Refills: 1 | Status: SHIPPED | OUTPATIENT
Start: 2025-07-09

## 2025-07-09 NOTE — PROGRESS NOTES
No components found for: \"LDLCHOLESTEROL\", \"LDLCALC\"    Lab Results   Component Value Date/Time    VLDL 52 07/07/2025 11:07 AM       No results found for: \"CHOLHDLRATIO\"  No components found for: \"LABMICR\", \"[id number]\"    Lab Results   Component Value Date/Time    VITD25 32.6 07/07/2025 11:07 AM        Review of Systems:  Constitutional: has fatigue, no fever, no recent weight gain, no recent weight loss, no changes in appetite  Eyes: no eye pain, no change in vision, no eye redness, no eye irritation, no double vision  Ears, nose, throat: no nasal congestion, no sore throat, no earache, has decrease in hearing, no hoarseness, no dry mouth, no sinus problems, no difficulty swallowing, no neck lumps, no dental problems, no mouth sores, no ringing in ears  Pulmonary: no shortness of breath, no wheezing, has dyspnea on exertion, no cough  Cardiovascular: no chest pain, no lower extremity edema, no orthopnea, no intermittent leg claudication, no palpitations  Gastrointestinal: no abdominal pain, no nausea, no vomiting, no diarrhea, no constipation, no dysphagia, no heartburn, no bloating  Genitourinary: no dysuria, no urinary incontinence, no urinary hesitancy, no urinary frequency, no feelings of urinary urgency, no nocturia  Musculoskeletal: no joint swelling, no joint stiffness, no joint pain, no muscle cramps, no muscle pain, no bone pain  Integument/Breast: no skin rashes, no skin lesions, no itching, has dry skin  Neurological: has numbness, has tingling, no weakness, no confusion, no headaches, no dizziness, no fainting, no tremors, no decrease in memory, has balance problems  Psychiatric: no anxiety, no depression, has insomnia  Hematologic/Lymphatic: no tendency for easy bleeding, no swollen lymph nodes, no tendency for easy bruising  Immunology: has seasonal allergies, no frequent infections, no frequent illnesses  Endocrine: no temperature intolerance    BP (!) 151/62   Pulse 66   Temp 98 °F (36.7 °C)

## (undated) DEVICE — FORCEPS BX L240CM JAW DIA2.4MM ORNG L CAP W/ NDL DISP RAD

## (undated) DEVICE — FORCEPS BX L240CM JAW DIA2.8MM L CAP W/ NDL MIC MESH TOOTH

## (undated) DEVICE — FORCEPS BX L240CM DIA2.4MM L NDL RAD JAW 4 133340

## (undated) DEVICE — CANNULA SAMP CO2 AD GRN 7FT CO2 AND 7FT O2 TBNG UNIV CONN